# Patient Record
Sex: MALE | Race: WHITE | NOT HISPANIC OR LATINO | Employment: FULL TIME | URBAN - METROPOLITAN AREA
[De-identification: names, ages, dates, MRNs, and addresses within clinical notes are randomized per-mention and may not be internally consistent; named-entity substitution may affect disease eponyms.]

---

## 2017-01-01 ENCOUNTER — TELEPHONE (OUTPATIENT)
Dept: VASCULAR LAB | Facility: MEDICAL CENTER | Age: 57
End: 2017-01-01

## 2017-01-01 ENCOUNTER — OFFICE VISIT (OUTPATIENT)
Dept: MEDICAL GROUP | Facility: MEDICAL CENTER | Age: 57
End: 2017-01-01
Payer: COMMERCIAL

## 2017-01-01 ENCOUNTER — APPOINTMENT (OUTPATIENT)
Dept: RADIOLOGY | Facility: MEDICAL CENTER | Age: 57
DRG: 291 | End: 2017-01-01
Attending: EMERGENCY MEDICINE
Payer: COMMERCIAL

## 2017-01-01 ENCOUNTER — RESOLUTE PROFESSIONAL BILLING HOSPITAL PROF FEE (OUTPATIENT)
Dept: HOSPITALIST | Facility: MEDICAL CENTER | Age: 57
End: 2017-01-01
Payer: COMMERCIAL

## 2017-01-01 ENCOUNTER — APPOINTMENT (OUTPATIENT)
Dept: RADIOLOGY | Facility: MEDICAL CENTER | Age: 57
End: 2017-01-01
Attending: EMERGENCY MEDICINE
Payer: COMMERCIAL

## 2017-01-01 ENCOUNTER — PATIENT OUTREACH (OUTPATIENT)
Dept: HEALTH INFORMATION MANAGEMENT | Facility: OTHER | Age: 57
End: 2017-01-01

## 2017-01-01 ENCOUNTER — TELEPHONE (OUTPATIENT)
Dept: MEDICAL GROUP | Facility: MEDICAL CENTER | Age: 57
End: 2017-01-01

## 2017-01-01 ENCOUNTER — APPOINTMENT (OUTPATIENT)
Dept: RADIOLOGY | Facility: MEDICAL CENTER | Age: 57
DRG: 291 | End: 2017-01-01
Attending: HOSPITALIST
Payer: COMMERCIAL

## 2017-01-01 ENCOUNTER — HOSPITAL ENCOUNTER (EMERGENCY)
Facility: MEDICAL CENTER | Age: 57
End: 2017-06-05
Attending: EMERGENCY MEDICINE
Payer: COMMERCIAL

## 2017-01-01 ENCOUNTER — APPOINTMENT (OUTPATIENT)
Dept: RADIOLOGY | Facility: MEDICAL CENTER | Age: 57
DRG: 637 | End: 2017-01-01
Attending: EMERGENCY MEDICINE
Payer: COMMERCIAL

## 2017-01-01 ENCOUNTER — OFFICE VISIT (OUTPATIENT)
Dept: MEDICAL GROUP | Facility: CLINIC | Age: 57
End: 2017-01-01
Payer: COMMERCIAL

## 2017-01-01 ENCOUNTER — APPOINTMENT (OUTPATIENT)
Dept: RADIOLOGY | Facility: MEDICAL CENTER | Age: 57
DRG: 981 | End: 2017-01-01
Attending: HOSPITALIST
Payer: COMMERCIAL

## 2017-01-01 ENCOUNTER — APPOINTMENT (OUTPATIENT)
Dept: RADIOLOGY | Facility: MEDICAL CENTER | Age: 57
DRG: 637 | End: 2017-01-01
Attending: HOSPITALIST
Payer: COMMERCIAL

## 2017-01-01 ENCOUNTER — OFFICE VISIT (OUTPATIENT)
Dept: CARDIOLOGY | Facility: MEDICAL CENTER | Age: 57
End: 2017-01-01
Payer: COMMERCIAL

## 2017-01-01 ENCOUNTER — HOSPITAL ENCOUNTER (INPATIENT)
Facility: MEDICAL CENTER | Age: 57
LOS: 5 days | DRG: 637 | End: 2017-03-04
Attending: EMERGENCY MEDICINE | Admitting: INTERNAL MEDICINE
Payer: COMMERCIAL

## 2017-01-01 ENCOUNTER — TELEPHONE (OUTPATIENT)
Dept: CARDIOLOGY | Facility: MEDICAL CENTER | Age: 57
End: 2017-01-01

## 2017-01-01 ENCOUNTER — HOSPITAL ENCOUNTER (INPATIENT)
Facility: MEDICAL CENTER | Age: 57
LOS: 4 days | DRG: 291 | End: 2017-03-17
Attending: EMERGENCY MEDICINE | Admitting: INTERNAL MEDICINE
Payer: COMMERCIAL

## 2017-01-01 ENCOUNTER — HOME CARE VISIT (OUTPATIENT)
Dept: HOME HEALTH SERVICES | Facility: HOME HEALTHCARE | Age: 57
End: 2017-01-01

## 2017-01-01 ENCOUNTER — APPOINTMENT (OUTPATIENT)
Dept: RADIOLOGY | Facility: REHABILITATION | Age: 57
DRG: 981 | End: 2017-01-01
Attending: HOSPITALIST
Payer: COMMERCIAL

## 2017-01-01 ENCOUNTER — APPOINTMENT (OUTPATIENT)
Dept: RADIOLOGY | Facility: MEDICAL CENTER | Age: 57
DRG: 291 | End: 2017-01-01
Attending: INTERNAL MEDICINE
Payer: COMMERCIAL

## 2017-01-01 ENCOUNTER — HOME HEALTH ADMISSION (OUTPATIENT)
Dept: HOME HEALTH SERVICES | Facility: HOME HEALTHCARE | Age: 57
End: 2017-01-01
Payer: COMMERCIAL

## 2017-01-01 ENCOUNTER — RESOLUTE PROFESSIONAL BILLING HOSPITAL PROF FEE (OUTPATIENT)
Dept: CARDIOLOGY | Facility: MEDICAL CENTER | Age: 57
End: 2017-01-01
Payer: COMMERCIAL

## 2017-01-01 ENCOUNTER — APPOINTMENT (OUTPATIENT)
Dept: RADIOLOGY | Facility: MEDICAL CENTER | Age: 57
DRG: 291 | End: 2017-01-01
Attending: RADIOLOGY
Payer: COMMERCIAL

## 2017-01-01 ENCOUNTER — HOSPITAL ENCOUNTER (OUTPATIENT)
Dept: LAB | Facility: MEDICAL CENTER | Age: 57
End: 2017-04-15
Attending: NURSE PRACTITIONER
Payer: COMMERCIAL

## 2017-01-01 ENCOUNTER — HOSPITAL ENCOUNTER (INPATIENT)
Facility: MEDICAL CENTER | Age: 57
LOS: 7 days | DRG: 291 | End: 2017-01-25
Attending: EMERGENCY MEDICINE | Admitting: INTERNAL MEDICINE
Payer: COMMERCIAL

## 2017-01-01 ENCOUNTER — HOSPITAL ENCOUNTER (OUTPATIENT)
Dept: RADIOLOGY | Facility: MEDICAL CENTER | Age: 57
End: 2017-01-04
Attending: HOSPITALIST
Payer: COMMERCIAL

## 2017-01-01 VITALS
HEIGHT: 73 IN | WEIGHT: 209 LBS | TEMPERATURE: 98.1 F | OXYGEN SATURATION: 86 % | RESPIRATION RATE: 16 BRPM | BODY MASS INDEX: 27.7 KG/M2 | SYSTOLIC BLOOD PRESSURE: 136 MMHG | DIASTOLIC BLOOD PRESSURE: 90 MMHG | HEART RATE: 80 BPM

## 2017-01-01 VITALS
HEART RATE: 72 BPM | BODY MASS INDEX: 28.99 KG/M2 | SYSTOLIC BLOOD PRESSURE: 122 MMHG | DIASTOLIC BLOOD PRESSURE: 64 MMHG | WEIGHT: 214 LBS | OXYGEN SATURATION: 86 % | HEIGHT: 72 IN

## 2017-01-01 VITALS
TEMPERATURE: 97.4 F | HEIGHT: 73 IN | WEIGHT: 188 LBS | SYSTOLIC BLOOD PRESSURE: 118 MMHG | RESPIRATION RATE: 16 BRPM | HEART RATE: 67 BPM | BODY MASS INDEX: 24.92 KG/M2 | DIASTOLIC BLOOD PRESSURE: 70 MMHG | OXYGEN SATURATION: 94 %

## 2017-01-01 VITALS
HEART RATE: 69 BPM | SYSTOLIC BLOOD PRESSURE: 126 MMHG | HEIGHT: 73 IN | WEIGHT: 219 LBS | DIASTOLIC BLOOD PRESSURE: 80 MMHG | RESPIRATION RATE: 16 BRPM | OXYGEN SATURATION: 96 % | TEMPERATURE: 98.5 F | BODY MASS INDEX: 29.03 KG/M2

## 2017-01-01 VITALS
TEMPERATURE: 97.7 F | HEART RATE: 75 BPM | SYSTOLIC BLOOD PRESSURE: 128 MMHG | BODY MASS INDEX: 31.56 KG/M2 | DIASTOLIC BLOOD PRESSURE: 68 MMHG | WEIGHT: 233 LBS | HEIGHT: 72 IN | RESPIRATION RATE: 16 BRPM | OXYGEN SATURATION: 93 %

## 2017-01-01 VITALS
SYSTOLIC BLOOD PRESSURE: 126 MMHG | WEIGHT: 185.19 LBS | OXYGEN SATURATION: 92 % | HEIGHT: 72 IN | DIASTOLIC BLOOD PRESSURE: 87 MMHG | TEMPERATURE: 96.8 F | BODY MASS INDEX: 25.08 KG/M2 | HEART RATE: 65 BPM | RESPIRATION RATE: 16 BRPM

## 2017-01-01 VITALS
WEIGHT: 180 LBS | HEART RATE: 63 BPM | HEIGHT: 72 IN | TEMPERATURE: 96.6 F | DIASTOLIC BLOOD PRESSURE: 123 MMHG | BODY MASS INDEX: 24.38 KG/M2 | SYSTOLIC BLOOD PRESSURE: 181 MMHG

## 2017-01-01 VITALS
WEIGHT: 208 LBS | HEART RATE: 90 BPM | OXYGEN SATURATION: 95 % | BODY MASS INDEX: 27.57 KG/M2 | SYSTOLIC BLOOD PRESSURE: 140 MMHG | HEIGHT: 73 IN | DIASTOLIC BLOOD PRESSURE: 76 MMHG

## 2017-01-01 VITALS
DIASTOLIC BLOOD PRESSURE: 70 MMHG | HEART RATE: 79 BPM | WEIGHT: 209 LBS | RESPIRATION RATE: 16 BRPM | HEIGHT: 73 IN | TEMPERATURE: 98.5 F | OXYGEN SATURATION: 95 % | SYSTOLIC BLOOD PRESSURE: 132 MMHG | BODY MASS INDEX: 27.7 KG/M2

## 2017-01-01 VITALS
HEART RATE: 68 BPM | RESPIRATION RATE: 18 BRPM | WEIGHT: 190.04 LBS | HEIGHT: 72 IN | BODY MASS INDEX: 25.74 KG/M2 | DIASTOLIC BLOOD PRESSURE: 63 MMHG | TEMPERATURE: 98.3 F | OXYGEN SATURATION: 99 % | SYSTOLIC BLOOD PRESSURE: 106 MMHG

## 2017-01-01 VITALS
HEIGHT: 72 IN | OXYGEN SATURATION: 90 % | WEIGHT: 205 LBS | DIASTOLIC BLOOD PRESSURE: 74 MMHG | SYSTOLIC BLOOD PRESSURE: 128 MMHG | HEART RATE: 84 BPM | BODY MASS INDEX: 27.77 KG/M2

## 2017-01-01 VITALS — OXYGEN SATURATION: 97 % | SYSTOLIC BLOOD PRESSURE: 126 MMHG | DIASTOLIC BLOOD PRESSURE: 69 MMHG | HEART RATE: 85 BPM

## 2017-01-01 VITALS
HEART RATE: 64 BPM | HEIGHT: 72 IN | WEIGHT: 178.13 LBS | BODY MASS INDEX: 24.13 KG/M2 | OXYGEN SATURATION: 90 % | TEMPERATURE: 97.1 F | DIASTOLIC BLOOD PRESSURE: 61 MMHG | SYSTOLIC BLOOD PRESSURE: 114 MMHG | RESPIRATION RATE: 18 BRPM

## 2017-01-01 DIAGNOSIS — Z95.828 PRESENCE OF IVC FILTER: ICD-10-CM

## 2017-01-01 DIAGNOSIS — I50.20 ACC/AHA STAGE C SYSTOLIC HEART FAILURE (HCC): ICD-10-CM

## 2017-01-01 DIAGNOSIS — J90 PLEURAL EFFUSION: ICD-10-CM

## 2017-01-01 DIAGNOSIS — E78.2 MIXED HYPERLIPIDEMIA: ICD-10-CM

## 2017-01-01 DIAGNOSIS — I25.10 3-VESSEL CORONARY ARTERY DISEASE: ICD-10-CM

## 2017-01-01 DIAGNOSIS — J18.9 RECURRENT PNEUMONIA: ICD-10-CM

## 2017-01-01 DIAGNOSIS — K21.9 GASTROESOPHAGEAL REFLUX DISEASE WITHOUT ESOPHAGITIS: ICD-10-CM

## 2017-01-01 DIAGNOSIS — R40.4 TRANSIENT ALTERATION OF AWARENESS: ICD-10-CM

## 2017-01-01 DIAGNOSIS — R73.9 HYPERGLYCEMIA: ICD-10-CM

## 2017-01-01 DIAGNOSIS — I50.22 CHRONIC SYSTOLIC CONGESTIVE HEART FAILURE, NYHA CLASS 4 (HCC): ICD-10-CM

## 2017-01-01 DIAGNOSIS — Z95.1 S/P CABG (CORONARY ARTERY BYPASS GRAFT): ICD-10-CM

## 2017-01-01 DIAGNOSIS — I50.43 ACUTE ON CHRONIC COMBINED SYSTOLIC AND DIASTOLIC CONGESTIVE HEART FAILURE (HCC): ICD-10-CM

## 2017-01-01 DIAGNOSIS — I25.5 ISCHEMIC CARDIOMYOPATHY: ICD-10-CM

## 2017-01-01 DIAGNOSIS — Z59.9 FINANCIAL DIFFICULTIES: ICD-10-CM

## 2017-01-01 DIAGNOSIS — E11.9 DIABETES MELLITUS TYPE 2 IN NONOBESE (HCC): ICD-10-CM

## 2017-01-01 DIAGNOSIS — J96.01 ACUTE RESPIRATORY FAILURE WITH HYPOXIA (HCC): ICD-10-CM

## 2017-01-01 DIAGNOSIS — Z09 HOSPITAL DISCHARGE FOLLOW-UP: ICD-10-CM

## 2017-01-01 DIAGNOSIS — I50.20 SYSTOLIC CONGESTIVE HEART FAILURE, UNSPECIFIED CONGESTIVE HEART FAILURE CHRONICITY: ICD-10-CM

## 2017-01-01 DIAGNOSIS — I50.9 HEART FAILURE, NYHA CLASS 3 (HCC): ICD-10-CM

## 2017-01-01 DIAGNOSIS — E11.3499: ICD-10-CM

## 2017-01-01 DIAGNOSIS — R53.81 DEBILITY: ICD-10-CM

## 2017-01-01 DIAGNOSIS — I50.22 CHRONIC SYSTOLIC CONGESTIVE HEART FAILURE, NYHA CLASS 4 (HCC): Primary | ICD-10-CM

## 2017-01-01 DIAGNOSIS — I51.9 CARDIAC DISORDER: ICD-10-CM

## 2017-01-01 DIAGNOSIS — D72.829 LEUKOCYTOSIS, UNSPECIFIED TYPE: ICD-10-CM

## 2017-01-01 DIAGNOSIS — I50.9 ACUTE CONGESTIVE HEART FAILURE, UNSPECIFIED CONGESTIVE HEART FAILURE TYPE: ICD-10-CM

## 2017-01-01 LAB
ALBUMIN SERPL BCP-MCNC: 2.6 G/DL (ref 3.2–4.9)
ALBUMIN SERPL BCP-MCNC: 2.7 G/DL (ref 3.2–4.9)
ALBUMIN SERPL BCP-MCNC: 2.7 G/DL (ref 3.2–4.9)
ALBUMIN SERPL BCP-MCNC: 2.8 G/DL (ref 3.2–4.9)
ALBUMIN SERPL BCP-MCNC: 2.9 G/DL (ref 3.2–4.9)
ALBUMIN SERPL BCP-MCNC: 3 G/DL (ref 3.2–4.9)
ALBUMIN SERPL BCP-MCNC: 3.1 G/DL (ref 3.2–4.9)
ALBUMIN SERPL BCP-MCNC: 3.2 G/DL (ref 3.2–4.9)
ALBUMIN/GLOB SERPL: 0.6 G/DL
ALBUMIN/GLOB SERPL: 0.8 G/DL
ALBUMIN/GLOB SERPL: 0.9 G/DL
ALBUMIN/GLOB SERPL: 1.1 G/DL
ALP SERPL-CCNC: 115 U/L (ref 30–99)
ALP SERPL-CCNC: 131 U/L (ref 30–99)
ALP SERPL-CCNC: 139 U/L (ref 30–99)
ALP SERPL-CCNC: 139 U/L (ref 30–99)
ALP SERPL-CCNC: 172 U/L (ref 30–99)
ALP SERPL-CCNC: 197 U/L (ref 30–99)
ALP SERPL-CCNC: 269 U/L (ref 30–99)
ALP SERPL-CCNC: 79 U/L (ref 30–99)
ALT SERPL-CCNC: 108 U/L (ref 2–50)
ALT SERPL-CCNC: 15 U/L (ref 2–50)
ALT SERPL-CCNC: 27 U/L (ref 2–50)
ALT SERPL-CCNC: 38 U/L (ref 2–50)
ALT SERPL-CCNC: 41 U/L (ref 2–50)
ALT SERPL-CCNC: 44 U/L (ref 2–50)
ALT SERPL-CCNC: 68 U/L (ref 2–50)
ALT SERPL-CCNC: 77 U/L (ref 2–50)
AMPHET UR QL SCN: NEGATIVE
ANION GAP SERPL CALC-SCNC: 10 MMOL/L (ref 0–11.9)
ANION GAP SERPL CALC-SCNC: 23 MMOL/L (ref 0–11.9)
ANION GAP SERPL CALC-SCNC: 4 MMOL/L (ref 0–11.9)
ANION GAP SERPL CALC-SCNC: 4 MMOL/L (ref 0–11.9)
ANION GAP SERPL CALC-SCNC: 5 MMOL/L (ref 0–11.9)
ANION GAP SERPL CALC-SCNC: 6 MMOL/L (ref 0–11.9)
ANION GAP SERPL CALC-SCNC: 6 MMOL/L (ref 0–11.9)
ANION GAP SERPL CALC-SCNC: 7 MMOL/L (ref 0–11.9)
ANION GAP SERPL CALC-SCNC: 7 MMOL/L (ref 0–11.9)
ANION GAP SERPL CALC-SCNC: 8 MMOL/L (ref 0–11.9)
ANION GAP SERPL CALC-SCNC: 8 MMOL/L (ref 0–11.9)
ANION GAP SERPL CALC-SCNC: 9 MMOL/L (ref 0–11.9)
ANISOCYTOSIS BLD QL SMEAR: ABNORMAL
ANISOCYTOSIS BLD QL SMEAR: ABNORMAL
APPEARANCE UR: ABNORMAL
APPEARANCE UR: CLEAR
APTT PPP: 29.3 SEC (ref 24.7–36)
APTT PPP: 33.3 SEC (ref 24.7–36)
APTT PPP: 35.4 SEC (ref 24.7–36)
AST SERPL-CCNC: 114 U/L (ref 12–45)
AST SERPL-CCNC: 140 U/L (ref 12–45)
AST SERPL-CCNC: 19 U/L (ref 12–45)
AST SERPL-CCNC: 20 U/L (ref 12–45)
AST SERPL-CCNC: 21 U/L (ref 12–45)
AST SERPL-CCNC: 22 U/L (ref 12–45)
AST SERPL-CCNC: 42 U/L (ref 12–45)
AST SERPL-CCNC: 8 U/L (ref 12–45)
BACTERIA #/AREA URNS HPF: ABNORMAL /HPF
BACTERIA BLD CULT: NORMAL
BACTERIA FLD AEROBE CULT: NORMAL
BACTERIA UR CULT: NORMAL
BARBITURATES UR QL SCN: NEGATIVE
BASOPHILS # BLD AUTO: 0 % (ref 0–1.8)
BASOPHILS # BLD AUTO: 0.2 % (ref 0–1.8)
BASOPHILS # BLD AUTO: 0.2 % (ref 0–1.8)
BASOPHILS # BLD AUTO: 0.3 % (ref 0–1.8)
BASOPHILS # BLD AUTO: 0.9 % (ref 0–1.8)
BASOPHILS # BLD: 0 K/UL (ref 0–0.12)
BASOPHILS # BLD: 0.02 K/UL (ref 0–0.12)
BASOPHILS # BLD: 0.03 K/UL (ref 0–0.12)
BASOPHILS # BLD: 0.04 K/UL (ref 0–0.12)
BASOPHILS # BLD: 0.24 K/UL (ref 0–0.12)
BENZODIAZ UR QL SCN: NEGATIVE
BILIRUB SERPL-MCNC: 0.6 MG/DL (ref 0.1–1.5)
BILIRUB SERPL-MCNC: 0.6 MG/DL (ref 0.1–1.5)
BILIRUB SERPL-MCNC: 0.7 MG/DL (ref 0.1–1.5)
BILIRUB SERPL-MCNC: 0.8 MG/DL (ref 0.1–1.5)
BILIRUB SERPL-MCNC: 0.8 MG/DL (ref 0.1–1.5)
BILIRUB SERPL-MCNC: 0.9 MG/DL (ref 0.1–1.5)
BILIRUB SERPL-MCNC: 1.1 MG/DL (ref 0.1–1.5)
BILIRUB SERPL-MCNC: 1.2 MG/DL (ref 0.1–1.5)
BILIRUB UR QL STRIP.AUTO: NEGATIVE
BNP SERPL-MCNC: 2027 PG/ML (ref 0–100)
BNP SERPL-MCNC: 2349 PG/ML (ref 0–100)
BNP SERPL-MCNC: 3035 PG/ML (ref 0–100)
BNP SERPL-MCNC: 561 PG/ML (ref 0–100)
BUN SERPL-MCNC: 22 MG/DL (ref 8–22)
BUN SERPL-MCNC: 22 MG/DL (ref 8–22)
BUN SERPL-MCNC: 23 MG/DL (ref 8–22)
BUN SERPL-MCNC: 23 MG/DL (ref 8–22)
BUN SERPL-MCNC: 25 MG/DL (ref 8–22)
BUN SERPL-MCNC: 25 MG/DL (ref 8–22)
BUN SERPL-MCNC: 26 MG/DL (ref 8–22)
BUN SERPL-MCNC: 32 MG/DL (ref 8–22)
BUN SERPL-MCNC: 34 MG/DL (ref 8–22)
BUN SERPL-MCNC: 38 MG/DL (ref 8–22)
BUN SERPL-MCNC: 39 MG/DL (ref 8–22)
BUN SERPL-MCNC: 45 MG/DL (ref 8–22)
BUN SERPL-MCNC: 49 MG/DL (ref 8–22)
BUN SERPL-MCNC: 69 MG/DL (ref 8–22)
BURR CELLS BLD QL SMEAR: NORMAL
BZE UR QL SCN: NEGATIVE
CALCIUM SERPL-MCNC: 7.8 MG/DL (ref 8.5–10.5)
CALCIUM SERPL-MCNC: 7.8 MG/DL (ref 8.5–10.5)
CALCIUM SERPL-MCNC: 8.2 MG/DL (ref 8.5–10.5)
CALCIUM SERPL-MCNC: 8.3 MG/DL (ref 8.5–10.5)
CALCIUM SERPL-MCNC: 8.4 MG/DL (ref 8.5–10.5)
CALCIUM SERPL-MCNC: 8.5 MG/DL (ref 8.5–10.5)
CALCIUM SERPL-MCNC: 8.5 MG/DL (ref 8.5–10.5)
CALCIUM SERPL-MCNC: 8.6 MG/DL (ref 8.5–10.5)
CALCIUM SERPL-MCNC: 8.7 MG/DL (ref 8.5–10.5)
CALCIUM SERPL-MCNC: 9.3 MG/DL (ref 8.5–10.5)
CANNABINOIDS UR QL SCN: NEGATIVE
CHLORIDE SERPL-SCNC: 100 MMOL/L (ref 96–112)
CHLORIDE SERPL-SCNC: 101 MMOL/L (ref 96–112)
CHLORIDE SERPL-SCNC: 101 MMOL/L (ref 96–112)
CHLORIDE SERPL-SCNC: 103 MMOL/L (ref 96–112)
CHLORIDE SERPL-SCNC: 103 MMOL/L (ref 96–112)
CHLORIDE SERPL-SCNC: 104 MMOL/L (ref 96–112)
CHLORIDE SERPL-SCNC: 104 MMOL/L (ref 96–112)
CHLORIDE SERPL-SCNC: 106 MMOL/L (ref 96–112)
CHLORIDE SERPL-SCNC: 107 MMOL/L (ref 96–112)
CHLORIDE SERPL-SCNC: 107 MMOL/L (ref 96–112)
CHLORIDE SERPL-SCNC: 108 MMOL/L (ref 96–112)
CHLORIDE SERPL-SCNC: 111 MMOL/L (ref 96–112)
CHLORIDE SERPL-SCNC: 115 MMOL/L (ref 96–112)
CHLORIDE SERPL-SCNC: 88 MMOL/L (ref 96–112)
CHLORIDE SERPL-SCNC: 89 MMOL/L (ref 96–112)
CHLORIDE SERPL-SCNC: 99 MMOL/L (ref 96–112)
CHOLEST SERPL-MCNC: 106 MG/DL (ref 100–199)
CO2 SERPL-SCNC: 15 MMOL/L (ref 20–33)
CO2 SERPL-SCNC: 19 MMOL/L (ref 20–33)
CO2 SERPL-SCNC: 20 MMOL/L (ref 20–33)
CO2 SERPL-SCNC: 21 MMOL/L (ref 20–33)
CO2 SERPL-SCNC: 22 MMOL/L (ref 20–33)
CO2 SERPL-SCNC: 23 MMOL/L (ref 20–33)
CO2 SERPL-SCNC: 23 MMOL/L (ref 20–33)
CO2 SERPL-SCNC: 25 MMOL/L (ref 20–33)
CO2 SERPL-SCNC: 26 MMOL/L (ref 20–33)
CO2 SERPL-SCNC: 27 MMOL/L (ref 20–33)
CO2 SERPL-SCNC: 30 MMOL/L (ref 20–33)
CO2 SERPL-SCNC: 31 MMOL/L (ref 20–33)
CO2 SERPL-SCNC: 31 MMOL/L (ref 20–33)
COLOR UR: ABNORMAL
COLOR UR: ABNORMAL
COLOR UR: YELLOW
COMMENT 1642: NORMAL
CREAT SERPL-MCNC: 0.68 MG/DL (ref 0.5–1.4)
CREAT SERPL-MCNC: 0.72 MG/DL (ref 0.5–1.4)
CREAT SERPL-MCNC: 0.75 MG/DL (ref 0.5–1.4)
CREAT SERPL-MCNC: 0.82 MG/DL (ref 0.5–1.4)
CREAT SERPL-MCNC: 0.82 MG/DL (ref 0.5–1.4)
CREAT SERPL-MCNC: 0.85 MG/DL (ref 0.5–1.4)
CREAT SERPL-MCNC: 0.86 MG/DL (ref 0.5–1.4)
CREAT SERPL-MCNC: 0.86 MG/DL (ref 0.5–1.4)
CREAT SERPL-MCNC: 0.88 MG/DL (ref 0.5–1.4)
CREAT SERPL-MCNC: 0.98 MG/DL (ref 0.5–1.4)
CREAT SERPL-MCNC: 1.06 MG/DL (ref 0.5–1.4)
CREAT SERPL-MCNC: 1.13 MG/DL (ref 0.5–1.4)
CREAT SERPL-MCNC: 1.23 MG/DL (ref 0.5–1.4)
CREAT SERPL-MCNC: 2.11 MG/DL (ref 0.5–1.4)
CULTURE IF INDICATED INDCX: NO UA CULTURE
DACRYOCYTES BLD QL SMEAR: NORMAL
DACRYOCYTES BLD QL SMEAR: NORMAL
EKG IMPRESSION: NORMAL
EOSINOPHIL # BLD AUTO: 0 K/UL (ref 0–0.51)
EOSINOPHIL # BLD AUTO: 0 K/UL (ref 0–0.51)
EOSINOPHIL # BLD AUTO: 0.02 K/UL (ref 0–0.51)
EOSINOPHIL # BLD AUTO: 0.09 K/UL (ref 0–0.51)
EOSINOPHIL # BLD AUTO: 0.26 K/UL (ref 0–0.51)
EOSINOPHIL # BLD AUTO: 0.29 K/UL (ref 0–0.51)
EOSINOPHIL # BLD AUTO: 0.38 K/UL (ref 0–0.51)
EOSINOPHIL # BLD AUTO: 1.13 K/UL (ref 0–0.51)
EOSINOPHIL NFR BLD: 0 % (ref 0–6.9)
EOSINOPHIL NFR BLD: 0 % (ref 0–6.9)
EOSINOPHIL NFR BLD: 0.1 % (ref 0–6.9)
EOSINOPHIL NFR BLD: 0.7 % (ref 0–6.9)
EOSINOPHIL NFR BLD: 2.9 % (ref 0–6.9)
EOSINOPHIL NFR BLD: 4 % (ref 0–6.9)
EOSINOPHIL NFR BLD: 4.3 % (ref 0–6.9)
EOSINOPHIL NFR BLD: 4.4 % (ref 0–6.9)
EPI CELLS #/AREA URNS HPF: ABNORMAL /HPF
ERYTHROCYTE [DISTWIDTH] IN BLOOD BY AUTOMATED COUNT: 47.5 FL (ref 35.9–50)
ERYTHROCYTE [DISTWIDTH] IN BLOOD BY AUTOMATED COUNT: 51.8 FL (ref 35.9–50)
ERYTHROCYTE [DISTWIDTH] IN BLOOD BY AUTOMATED COUNT: 52 FL (ref 35.9–50)
ERYTHROCYTE [DISTWIDTH] IN BLOOD BY AUTOMATED COUNT: 52.2 FL (ref 35.9–50)
ERYTHROCYTE [DISTWIDTH] IN BLOOD BY AUTOMATED COUNT: 52.4 FL (ref 35.9–50)
ERYTHROCYTE [DISTWIDTH] IN BLOOD BY AUTOMATED COUNT: 52.5 FL (ref 35.9–50)
ERYTHROCYTE [DISTWIDTH] IN BLOOD BY AUTOMATED COUNT: 52.6 FL (ref 35.9–50)
ERYTHROCYTE [DISTWIDTH] IN BLOOD BY AUTOMATED COUNT: 53 FL (ref 35.9–50)
ERYTHROCYTE [DISTWIDTH] IN BLOOD BY AUTOMATED COUNT: 53.1 FL (ref 35.9–50)
ERYTHROCYTE [DISTWIDTH] IN BLOOD BY AUTOMATED COUNT: 53.7 FL (ref 35.9–50)
ERYTHROCYTE [DISTWIDTH] IN BLOOD BY AUTOMATED COUNT: 54.8 FL (ref 35.9–50)
ERYTHROCYTE [DISTWIDTH] IN BLOOD BY AUTOMATED COUNT: 54.8 FL (ref 35.9–50)
ERYTHROCYTE [DISTWIDTH] IN BLOOD BY AUTOMATED COUNT: 55.8 FL (ref 35.9–50)
EST. AVERAGE GLUCOSE BLD GHB EST-MCNC: 427 MG/DL
FERRITIN SERPL-MCNC: 767.5 NG/ML (ref 22–322)
FOLATE SERPL-MCNC: 7.2 NG/ML
GFR SERPL CREATININE-BSD FRML MDRD: 33 ML/MIN/1.73 M 2
GFR SERPL CREATININE-BSD FRML MDRD: >60 ML/MIN/1.73 M 2
GIANT PLATELETS BLD QL SMEAR: NORMAL
GLOBULIN SER CALC-MCNC: 2.9 G/DL (ref 1.9–3.5)
GLOBULIN SER CALC-MCNC: 3.3 G/DL (ref 1.9–3.5)
GLOBULIN SER CALC-MCNC: 3.3 G/DL (ref 1.9–3.5)
GLOBULIN SER CALC-MCNC: 3.6 G/DL (ref 1.9–3.5)
GLOBULIN SER CALC-MCNC: 3.7 G/DL (ref 1.9–3.5)
GLOBULIN SER CALC-MCNC: 4.2 G/DL (ref 1.9–3.5)
GLUCOSE BLD-MCNC: 100 MG/DL (ref 65–99)
GLUCOSE BLD-MCNC: 105 MG/DL (ref 65–99)
GLUCOSE BLD-MCNC: 111 MG/DL (ref 65–99)
GLUCOSE BLD-MCNC: 115 MG/DL (ref 65–99)
GLUCOSE BLD-MCNC: 122 MG/DL (ref 65–99)
GLUCOSE BLD-MCNC: 125 MG/DL (ref 65–99)
GLUCOSE BLD-MCNC: 128 MG/DL (ref 65–99)
GLUCOSE BLD-MCNC: 131 MG/DL (ref 65–99)
GLUCOSE BLD-MCNC: 134 MG/DL (ref 65–99)
GLUCOSE BLD-MCNC: 141 MG/DL (ref 65–99)
GLUCOSE BLD-MCNC: 145 MG/DL (ref 65–99)
GLUCOSE BLD-MCNC: 145 MG/DL (ref 65–99)
GLUCOSE BLD-MCNC: 147 MG/DL (ref 65–99)
GLUCOSE BLD-MCNC: 151 MG/DL (ref 65–99)
GLUCOSE BLD-MCNC: 153 MG/DL (ref 65–99)
GLUCOSE BLD-MCNC: 155 MG/DL (ref 65–99)
GLUCOSE BLD-MCNC: 155 MG/DL (ref 65–99)
GLUCOSE BLD-MCNC: 156 MG/DL (ref 65–99)
GLUCOSE BLD-MCNC: 158 MG/DL (ref 65–99)
GLUCOSE BLD-MCNC: 159 MG/DL (ref 65–99)
GLUCOSE BLD-MCNC: 159 MG/DL (ref 65–99)
GLUCOSE BLD-MCNC: 166 MG/DL (ref 65–99)
GLUCOSE BLD-MCNC: 170 MG/DL (ref 65–99)
GLUCOSE BLD-MCNC: 171 MG/DL (ref 65–99)
GLUCOSE BLD-MCNC: 176 MG/DL (ref 65–99)
GLUCOSE BLD-MCNC: 179 MG/DL (ref 65–99)
GLUCOSE BLD-MCNC: 180 MG/DL (ref 65–99)
GLUCOSE BLD-MCNC: 182 MG/DL (ref 65–99)
GLUCOSE BLD-MCNC: 185 MG/DL (ref 65–99)
GLUCOSE BLD-MCNC: 189 MG/DL (ref 65–99)
GLUCOSE BLD-MCNC: 193 MG/DL (ref 65–99)
GLUCOSE BLD-MCNC: 195 MG/DL (ref 65–99)
GLUCOSE BLD-MCNC: 199 MG/DL (ref 65–99)
GLUCOSE BLD-MCNC: 203 MG/DL (ref 65–99)
GLUCOSE BLD-MCNC: 203 MG/DL (ref 65–99)
GLUCOSE BLD-MCNC: 208 MG/DL (ref 65–99)
GLUCOSE BLD-MCNC: 209 MG/DL (ref 65–99)
GLUCOSE BLD-MCNC: 211 MG/DL (ref 65–99)
GLUCOSE BLD-MCNC: 214 MG/DL (ref 65–99)
GLUCOSE BLD-MCNC: 215 MG/DL (ref 65–99)
GLUCOSE BLD-MCNC: 216 MG/DL (ref 65–99)
GLUCOSE BLD-MCNC: 218 MG/DL (ref 65–99)
GLUCOSE BLD-MCNC: 221 MG/DL (ref 65–99)
GLUCOSE BLD-MCNC: 227 MG/DL (ref 65–99)
GLUCOSE BLD-MCNC: 229 MG/DL (ref 65–99)
GLUCOSE BLD-MCNC: 234 MG/DL (ref 65–99)
GLUCOSE BLD-MCNC: 255 MG/DL (ref 65–99)
GLUCOSE BLD-MCNC: 255 MG/DL (ref 65–99)
GLUCOSE BLD-MCNC: 259 MG/DL (ref 65–99)
GLUCOSE BLD-MCNC: 267 MG/DL (ref 65–99)
GLUCOSE BLD-MCNC: 275 MG/DL (ref 65–99)
GLUCOSE BLD-MCNC: 279 MG/DL (ref 65–99)
GLUCOSE BLD-MCNC: 283 MG/DL (ref 65–99)
GLUCOSE BLD-MCNC: 285 MG/DL (ref 65–99)
GLUCOSE BLD-MCNC: 317 MG/DL (ref 65–99)
GLUCOSE BLD-MCNC: 320 MG/DL (ref 65–99)
GLUCOSE BLD-MCNC: 326 MG/DL (ref 65–99)
GLUCOSE BLD-MCNC: 330 MG/DL (ref 65–99)
GLUCOSE BLD-MCNC: 335 MG/DL (ref 65–99)
GLUCOSE BLD-MCNC: 350 MG/DL (ref 65–99)
GLUCOSE BLD-MCNC: 370 MG/DL (ref 65–99)
GLUCOSE BLD-MCNC: 431 MG/DL (ref 65–99)
GLUCOSE BLD-MCNC: 444 MG/DL (ref 65–99)
GLUCOSE BLD-MCNC: 469 MG/DL (ref 65–99)
GLUCOSE BLD-MCNC: 71 MG/DL (ref 65–99)
GLUCOSE BLD-MCNC: 94 MG/DL (ref 65–99)
GLUCOSE BLD-MCNC: >600 MG/DL (ref 65–99)
GLUCOSE BLD-MCNC: >600 MG/DL (ref 65–99)
GLUCOSE SERPL-MCNC: 102 MG/DL (ref 65–99)
GLUCOSE SERPL-MCNC: 1296 MG/DL (ref 65–99)
GLUCOSE SERPL-MCNC: 141 MG/DL (ref 65–99)
GLUCOSE SERPL-MCNC: 160 MG/DL (ref 65–99)
GLUCOSE SERPL-MCNC: 220 MG/DL (ref 65–99)
GLUCOSE SERPL-MCNC: 251 MG/DL (ref 65–99)
GLUCOSE SERPL-MCNC: 261 MG/DL (ref 65–99)
GLUCOSE SERPL-MCNC: 271 MG/DL (ref 65–99)
GLUCOSE SERPL-MCNC: 291 MG/DL (ref 65–99)
GLUCOSE SERPL-MCNC: 325 MG/DL (ref 65–99)
GLUCOSE SERPL-MCNC: 382 MG/DL (ref 65–99)
GLUCOSE SERPL-MCNC: 410 MG/DL (ref 65–99)
GLUCOSE SERPL-MCNC: 453 MG/DL (ref 65–99)
GLUCOSE SERPL-MCNC: 556 MG/DL (ref 65–99)
GLUCOSE SERPL-MCNC: 600 MG/DL (ref 65–99)
GLUCOSE SERPL-MCNC: 635 MG/DL (ref 65–99)
GLUCOSE SERPL-MCNC: 816 MG/DL (ref 65–99)
GLUCOSE SERPL-MCNC: 892 MG/DL (ref 65–99)
GLUCOSE UR STRIP.AUTO-MCNC: 500 MG/DL
GLUCOSE UR STRIP.AUTO-MCNC: >1000 MG/DL
GLUCOSE UR STRIP.AUTO-MCNC: >1000 MG/DL
GLUCOSE UR STRIP.AUTO-MCNC: ABNORMAL MG/DL
GLUCOSE UR STRIP.AUTO-MCNC: NEGATIVE MG/DL
GRAM STN SPEC: NORMAL
GRAM STN SPEC: NORMAL
HBA1C MFR BLD: 16.5 % (ref 0–5.6)
HCT VFR BLD AUTO: 35.3 % (ref 42–52)
HCT VFR BLD AUTO: 35.5 % (ref 42–52)
HCT VFR BLD AUTO: 36.3 % (ref 42–52)
HCT VFR BLD AUTO: 37.2 % (ref 42–52)
HCT VFR BLD AUTO: 38.2 % (ref 42–52)
HCT VFR BLD AUTO: 38.6 % (ref 42–52)
HCT VFR BLD AUTO: 39.7 % (ref 42–52)
HCT VFR BLD AUTO: 39.7 % (ref 42–52)
HCT VFR BLD AUTO: 41.6 % (ref 42–52)
HCT VFR BLD AUTO: 41.8 % (ref 42–52)
HCT VFR BLD AUTO: 42.6 % (ref 42–52)
HCT VFR BLD AUTO: 43.4 % (ref 42–52)
HCT VFR BLD AUTO: 48 % (ref 42–52)
HDLC SERPL-MCNC: 33 MG/DL
HGB BLD-MCNC: 10.8 G/DL (ref 14–18)
HGB BLD-MCNC: 11.2 G/DL (ref 14–18)
HGB BLD-MCNC: 11.6 G/DL (ref 14–18)
HGB BLD-MCNC: 11.6 G/DL (ref 14–18)
HGB BLD-MCNC: 11.8 G/DL (ref 14–18)
HGB BLD-MCNC: 12.3 G/DL (ref 14–18)
HGB BLD-MCNC: 12.4 G/DL (ref 14–18)
HGB BLD-MCNC: 12.6 G/DL (ref 14–18)
HGB BLD-MCNC: 12.6 G/DL (ref 14–18)
HGB BLD-MCNC: 13.3 G/DL (ref 14–18)
HGB BLD-MCNC: 13.4 G/DL (ref 14–18)
HGB BLD-MCNC: 13.5 G/DL (ref 14–18)
HGB BLD-MCNC: 14 G/DL (ref 14–18)
HYALINE CASTS #/AREA URNS LPF: >10 /LPF
HYALINE CASTS #/AREA URNS LPF: >10 /LPF
HYALINE CASTS #/AREA URNS LPF: ABNORMAL /LPF
HYALINE CASTS #/AREA URNS LPF: ABNORMAL /LPF
IMM GRANULOCYTES # BLD AUTO: 0.03 K/UL (ref 0–0.11)
IMM GRANULOCYTES # BLD AUTO: 0.08 K/UL (ref 0–0.11)
IMM GRANULOCYTES # BLD AUTO: 0.08 K/UL (ref 0–0.11)
IMM GRANULOCYTES NFR BLD AUTO: 0.3 % (ref 0–0.9)
IMM GRANULOCYTES NFR BLD AUTO: 0.6 % (ref 0–0.9)
IMM GRANULOCYTES NFR BLD AUTO: 0.6 % (ref 0–0.9)
INR PPP: 0.99 (ref 0.87–1.13)
INR PPP: 1.01 (ref 0.87–1.13)
INR PPP: 1.31 (ref 0.87–1.13)
IRON SATN MFR SERPL: 18 % (ref 15–55)
IRON SERPL-MCNC: 38 UG/DL (ref 50–180)
KETONES UR STRIP.AUTO-MCNC: ABNORMAL MG/DL
KETONES UR STRIP.AUTO-MCNC: NEGATIVE MG/DL
LACTATE BLD-SCNC: 1.4 MMOL/L (ref 0.5–2)
LACTATE BLD-SCNC: 1.7 MMOL/L (ref 0.5–2)
LACTATE BLD-SCNC: 2.1 MMOL/L (ref 0.5–2)
LACTATE BLD-SCNC: 2.9 MMOL/L (ref 0.5–2)
LACTATE BLD-SCNC: 8.4 MMOL/L (ref 0.5–2)
LDLC SERPL CALC-MCNC: 50 MG/DL
LEUKOCYTE ESTERASE UR QL STRIP.AUTO: NEGATIVE
LG PLATELETS BLD QL SMEAR: NORMAL
LIPASE SERPL-CCNC: 33 U/L (ref 11–82)
LIPASE SERPL-CCNC: 6 U/L (ref 11–82)
LYMPHOCYTES # BLD AUTO: 0.69 K/UL (ref 1–4.8)
LYMPHOCYTES # BLD AUTO: 1.01 K/UL (ref 1–4.8)
LYMPHOCYTES # BLD AUTO: 1.36 K/UL (ref 1–4.8)
LYMPHOCYTES # BLD AUTO: 1.6 K/UL (ref 1–4.8)
LYMPHOCYTES # BLD AUTO: 1.87 K/UL (ref 1–4.8)
LYMPHOCYTES # BLD AUTO: 1.87 K/UL (ref 1–4.8)
LYMPHOCYTES # BLD AUTO: 2.72 K/UL (ref 1–4.8)
LYMPHOCYTES # BLD AUTO: 2.74 K/UL (ref 1–4.8)
LYMPHOCYTES NFR BLD: 10.4 % (ref 22–41)
LYMPHOCYTES NFR BLD: 12.3 % (ref 22–41)
LYMPHOCYTES NFR BLD: 20 % (ref 22–41)
LYMPHOCYTES NFR BLD: 21.1 % (ref 22–41)
LYMPHOCYTES NFR BLD: 21.7 % (ref 22–41)
LYMPHOCYTES NFR BLD: 38 % (ref 22–41)
LYMPHOCYTES NFR BLD: 7 % (ref 22–41)
LYMPHOCYTES NFR BLD: 7.2 % (ref 22–41)
MACROCYTES BLD QL SMEAR: ABNORMAL
MAGNESIUM SERPL-MCNC: 1.4 MG/DL (ref 1.5–2.5)
MAGNESIUM SERPL-MCNC: 1.9 MG/DL (ref 1.5–2.5)
MAGNESIUM SERPL-MCNC: 1.9 MG/DL (ref 1.5–2.5)
MAGNESIUM SERPL-MCNC: 2.6 MG/DL (ref 1.5–2.5)
MANUAL DIFF BLD: NORMAL
MCH RBC QN AUTO: 26.1 PG (ref 27–33)
MCH RBC QN AUTO: 26.3 PG (ref 27–33)
MCH RBC QN AUTO: 26.3 PG (ref 27–33)
MCH RBC QN AUTO: 26.4 PG (ref 27–33)
MCH RBC QN AUTO: 26.5 PG (ref 27–33)
MCH RBC QN AUTO: 26.7 PG (ref 27–33)
MCH RBC QN AUTO: 27 PG (ref 27–33)
MCH RBC QN AUTO: 27.3 PG (ref 27–33)
MCH RBC QN AUTO: 27.5 PG (ref 27–33)
MCHC RBC AUTO-ENTMCNC: 29.2 G/DL (ref 33.7–35.3)
MCHC RBC AUTO-ENTMCNC: 29.8 G/DL (ref 33.7–35.3)
MCHC RBC AUTO-ENTMCNC: 30.1 G/DL (ref 33.7–35.3)
MCHC RBC AUTO-ENTMCNC: 30.6 G/DL (ref 33.7–35.3)
MCHC RBC AUTO-ENTMCNC: 31.1 G/DL (ref 33.7–35.3)
MCHC RBC AUTO-ENTMCNC: 31.2 G/DL (ref 33.7–35.3)
MCHC RBC AUTO-ENTMCNC: 31.5 G/DL (ref 33.7–35.3)
MCHC RBC AUTO-ENTMCNC: 31.7 G/DL (ref 33.7–35.3)
MCHC RBC AUTO-ENTMCNC: 32 G/DL (ref 33.7–35.3)
MCHC RBC AUTO-ENTMCNC: 32.1 G/DL (ref 33.7–35.3)
MCHC RBC AUTO-ENTMCNC: 32.2 G/DL (ref 33.7–35.3)
MCV RBC AUTO: 81.8 FL (ref 81.4–97.8)
MCV RBC AUTO: 82.3 FL (ref 81.4–97.8)
MCV RBC AUTO: 83.1 FL (ref 81.4–97.8)
MCV RBC AUTO: 83.4 FL (ref 81.4–97.8)
MCV RBC AUTO: 83.4 FL (ref 81.4–97.8)
MCV RBC AUTO: 83.8 FL (ref 81.4–97.8)
MCV RBC AUTO: 85 FL (ref 81.4–97.8)
MCV RBC AUTO: 85.9 FL (ref 81.4–97.8)
MCV RBC AUTO: 85.9 FL (ref 81.4–97.8)
MCV RBC AUTO: 87 FL (ref 81.4–97.8)
MCV RBC AUTO: 87.5 FL (ref 81.4–97.8)
MCV RBC AUTO: 88.5 FL (ref 81.4–97.8)
MCV RBC AUTO: 93.6 FL (ref 81.4–97.8)
MDMA UR QL SCN: NEGATIVE
METAMYELOCYTES NFR BLD MANUAL: 5.2 %
METHADONE UR QL SCN: NEGATIVE
MICRO URNS: ABNORMAL
MICROCYTES BLD QL SMEAR: ABNORMAL
MICROCYTES BLD QL SMEAR: ABNORMAL
MONOCYTES # BLD AUTO: 0 K/UL (ref 0–0.85)
MONOCYTES # BLD AUTO: 0 K/UL (ref 0–0.85)
MONOCYTES # BLD AUTO: 0.34 K/UL (ref 0–0.85)
MONOCYTES # BLD AUTO: 0.37 K/UL (ref 0–0.85)
MONOCYTES # BLD AUTO: 0.79 K/UL (ref 0–0.85)
MONOCYTES # BLD AUTO: 0.99 K/UL (ref 0–0.85)
MONOCYTES # BLD AUTO: 1.12 K/UL (ref 0–0.85)
MONOCYTES # BLD AUTO: 1.29 K/UL (ref 0–0.85)
MONOCYTES NFR BLD AUTO: 0 % (ref 0–13.4)
MONOCYTES NFR BLD AUTO: 0 % (ref 0–13.4)
MONOCYTES NFR BLD AUTO: 11 % (ref 0–13.4)
MONOCYTES NFR BLD AUTO: 11.2 % (ref 0–13.4)
MONOCYTES NFR BLD AUTO: 4.3 % (ref 0–13.4)
MONOCYTES NFR BLD AUTO: 5 % (ref 0–13.4)
MONOCYTES NFR BLD AUTO: 8 % (ref 0–13.4)
MONOCYTES NFR BLD AUTO: 9.9 % (ref 0–13.4)
MORPHOLOGY BLD-IMP: NORMAL
MUCOUS THREADS #/AREA URNS HPF: ABNORMAL /HPF
MYELOCYTES NFR BLD MANUAL: 2.6 %
NEUTROPHILS # BLD AUTO: 11.68 K/UL (ref 1.82–7.42)
NEUTROPHILS # BLD AUTO: 18.71 K/UL (ref 1.82–7.42)
NEUTROPHILS # BLD AUTO: 3.38 K/UL (ref 1.82–7.42)
NEUTROPHILS # BLD AUTO: 5.1 K/UL (ref 1.82–7.42)
NEUTROPHILS # BLD AUTO: 5.68 K/UL (ref 1.82–7.42)
NEUTROPHILS # BLD AUTO: 5.99 K/UL (ref 1.82–7.42)
NEUTROPHILS # BLD AUTO: 9.21 K/UL (ref 1.82–7.42)
NEUTROPHILS # BLD AUTO: 9.97 K/UL (ref 1.82–7.42)
NEUTROPHILS NFR BLD: 47 % (ref 44–72)
NEUTROPHILS NFR BLD: 64.3 % (ref 44–72)
NEUTROPHILS NFR BLD: 64.4 % (ref 44–72)
NEUTROPHILS NFR BLD: 65 % (ref 44–72)
NEUTROPHILS NFR BLD: 67 % (ref 44–72)
NEUTROPHILS NFR BLD: 76.3 % (ref 44–72)
NEUTROPHILS NFR BLD: 83.8 % (ref 44–72)
NEUTROPHILS NFR BLD: 92.1 % (ref 44–72)
NEUTS BAND NFR BLD MANUAL: 0.9 % (ref 0–10)
NEUTS BAND NFR BLD MANUAL: 10 % (ref 0–10)
NEUTS BAND NFR BLD MANUAL: 2.6 % (ref 0–10)
NEUTS BAND NFR BLD MANUAL: 7 % (ref 0–10)
NITRITE UR QL STRIP.AUTO: NEGATIVE
NRBC # BLD AUTO: 0 K/UL
NRBC # BLD AUTO: 0.2 K/UL
NRBC BLD AUTO-RTO: 0 /100 WBC
NRBC BLD AUTO-RTO: 0.8 /100 WBC
OPIATES UR QL SCN: NEGATIVE
OVALOCYTES BLD QL SMEAR: NORMAL
OXYCODONE UR QL SCN: NEGATIVE
PCP UR QL SCN: NEGATIVE
PH UR STRIP.AUTO: 6 [PH]
PH UR STRIP.AUTO: 6.5 [PH]
PHOSPHATE SERPL-MCNC: 9.4 MG/DL (ref 2.5–4.5)
PLATELET # BLD AUTO: 150 K/UL (ref 164–446)
PLATELET # BLD AUTO: 156 K/UL (ref 164–446)
PLATELET # BLD AUTO: 162 K/UL (ref 164–446)
PLATELET # BLD AUTO: 164 K/UL (ref 164–446)
PLATELET # BLD AUTO: 184 K/UL (ref 164–446)
PLATELET # BLD AUTO: 186 K/UL (ref 164–446)
PLATELET # BLD AUTO: 191 K/UL (ref 164–446)
PLATELET # BLD AUTO: 212 K/UL (ref 164–446)
PLATELET # BLD AUTO: 214 K/UL (ref 164–446)
PLATELET # BLD AUTO: 227 K/UL (ref 164–446)
PLATELET # BLD AUTO: 232 K/UL (ref 164–446)
PLATELET # BLD AUTO: 235 K/UL (ref 164–446)
PLATELET # BLD AUTO: 333 K/UL (ref 164–446)
PLATELET BLD QL SMEAR: NORMAL
PMV BLD AUTO: 10.1 FL (ref 9–12.9)
PMV BLD AUTO: 10.3 FL (ref 9–12.9)
PMV BLD AUTO: 10.6 FL (ref 9–12.9)
PMV BLD AUTO: 10.7 FL (ref 9–12.9)
PMV BLD AUTO: 10.7 FL (ref 9–12.9)
PMV BLD AUTO: 11 FL (ref 9–12.9)
PMV BLD AUTO: 11 FL (ref 9–12.9)
PMV BLD AUTO: 11.1 FL (ref 9–12.9)
PMV BLD AUTO: 11.1 FL (ref 9–12.9)
PMV BLD AUTO: 11.3 FL (ref 9–12.9)
PMV BLD AUTO: 11.4 FL (ref 9–12.9)
PMV BLD AUTO: 11.6 FL (ref 9–12.9)
PMV BLD AUTO: 11.7 FL (ref 9–12.9)
POIKILOCYTOSIS BLD QL SMEAR: NORMAL
POLYCHROMASIA BLD QL SMEAR: NORMAL
POTASSIUM SERPL-SCNC: 3.3 MMOL/L (ref 3.6–5.5)
POTASSIUM SERPL-SCNC: 3.5 MMOL/L (ref 3.6–5.5)
POTASSIUM SERPL-SCNC: 3.7 MMOL/L (ref 3.6–5.5)
POTASSIUM SERPL-SCNC: 3.8 MMOL/L (ref 3.6–5.5)
POTASSIUM SERPL-SCNC: 3.9 MMOL/L (ref 3.6–5.5)
POTASSIUM SERPL-SCNC: 3.9 MMOL/L (ref 3.6–5.5)
POTASSIUM SERPL-SCNC: 4 MMOL/L (ref 3.6–5.5)
POTASSIUM SERPL-SCNC: 4.1 MMOL/L (ref 3.6–5.5)
POTASSIUM SERPL-SCNC: 4.5 MMOL/L (ref 3.6–5.5)
POTASSIUM SERPL-SCNC: 4.5 MMOL/L (ref 3.6–5.5)
POTASSIUM SERPL-SCNC: 4.7 MMOL/L (ref 3.6–5.5)
POTASSIUM SERPL-SCNC: 4.8 MMOL/L (ref 3.6–5.5)
POTASSIUM SERPL-SCNC: 5.3 MMOL/L (ref 3.6–5.5)
PROMYELOCYTES NFR BLD MANUAL: 1.7 %
PROPOXYPH UR QL SCN: NEGATIVE
PROT SERPL-MCNC: 5.5 G/DL (ref 6–8.2)
PROT SERPL-MCNC: 5.6 G/DL (ref 6–8.2)
PROT SERPL-MCNC: 6.1 G/DL (ref 6–8.2)
PROT SERPL-MCNC: 6.1 G/DL (ref 6–8.2)
PROT SERPL-MCNC: 6.4 G/DL (ref 6–8.2)
PROT SERPL-MCNC: 6.6 G/DL (ref 6–8.2)
PROT SERPL-MCNC: 6.6 G/DL (ref 6–8.2)
PROT SERPL-MCNC: 6.9 G/DL (ref 6–8.2)
PROT UR QL STRIP: 100 MG/DL
PROT UR QL STRIP: 200 MG/DL
PROT UR QL STRIP: 200 MG/DL
PROTHROMBIN TIME: 13.4 SEC (ref 12–14.6)
PROTHROMBIN TIME: 13.6 SEC (ref 12–14.6)
PROTHROMBIN TIME: 16.7 SEC (ref 12–14.6)
RBC # BLD AUTO: 4.08 M/UL (ref 4.7–6.1)
RBC # BLD AUTO: 4.11 M/UL (ref 4.7–6.1)
RBC # BLD AUTO: 4.41 M/UL (ref 4.7–6.1)
RBC # BLD AUTO: 4.44 M/UL (ref 4.7–6.1)
RBC # BLD AUTO: 4.46 M/UL (ref 4.7–6.1)
RBC # BLD AUTO: 4.56 M/UL (ref 4.7–6.1)
RBC # BLD AUTO: 4.7 M/UL (ref 4.7–6.1)
RBC # BLD AUTO: 4.76 M/UL (ref 4.7–6.1)
RBC # BLD AUTO: 4.78 M/UL (ref 4.7–6.1)
RBC # BLD AUTO: 5.01 M/UL (ref 4.7–6.1)
RBC # BLD AUTO: 5.05 M/UL (ref 4.7–6.1)
RBC # BLD AUTO: 5.08 M/UL (ref 4.7–6.1)
RBC # BLD AUTO: 5.13 M/UL (ref 4.7–6.1)
RBC # URNS HPF: ABNORMAL /HPF
RBC BLD AUTO: PRESENT
RBC UR QL AUTO: ABNORMAL
RBC UR QL AUTO: NEGATIVE
SCHISTOCYTES BLD QL SMEAR: NORMAL
SIGNIFICANT IND 70042: NORMAL
SITE SITE: NORMAL
SODIUM SERPL-SCNC: 118 MMOL/L (ref 135–145)
SODIUM SERPL-SCNC: 127 MMOL/L (ref 135–145)
SODIUM SERPL-SCNC: 131 MMOL/L (ref 135–145)
SODIUM SERPL-SCNC: 134 MMOL/L (ref 135–145)
SODIUM SERPL-SCNC: 135 MMOL/L (ref 135–145)
SODIUM SERPL-SCNC: 135 MMOL/L (ref 135–145)
SODIUM SERPL-SCNC: 136 MMOL/L (ref 135–145)
SODIUM SERPL-SCNC: 136 MMOL/L (ref 135–145)
SODIUM SERPL-SCNC: 137 MMOL/L (ref 135–145)
SODIUM SERPL-SCNC: 139 MMOL/L (ref 135–145)
SODIUM SERPL-SCNC: 140 MMOL/L (ref 135–145)
SODIUM SERPL-SCNC: 141 MMOL/L (ref 135–145)
SOURCE SOURCE: NORMAL
SP GR UR STRIP.AUTO: 1.01
SP GR UR STRIP.AUTO: 1.01
SP GR UR STRIP.AUTO: 1.02
SPERM #/AREA URNS HPF: PRESENT /HPF
SPHEROCYTES BLD QL SMEAR: NORMAL
TIBC SERPL-MCNC: 214 UG/DL (ref 250–450)
TOXIC GRANULES BLD QL SMEAR: SLIGHT
TRANSFERRIN SERPL-MCNC: 152 MG/DL (ref 200–370)
TRIGL SERPL-MCNC: 115 MG/DL (ref 0–149)
TROPONIN I SERPL-MCNC: 0.02 NG/ML (ref 0–0.04)
TROPONIN I SERPL-MCNC: 0.09 NG/ML (ref 0–0.04)
TROPONIN I SERPL-MCNC: 0.69 NG/ML (ref 0–0.04)
TROPONIN I SERPL-MCNC: <0.01 NG/ML (ref 0–0.04)
TROPONIN I SERPL-MCNC: <0.01 NG/ML (ref 0–0.04)
TSH SERPL DL<=0.005 MIU/L-ACNC: 1.44 UIU/ML (ref 0.3–3.7)
TSH SERPL DL<=0.005 MIU/L-ACNC: 3.65 UIU/ML (ref 0.3–3.7)
VARIANT LYMPHS BLD QL SMEAR: NORMAL
VIT B12 SERPL-MCNC: 524 PG/ML (ref 211–911)
WBC # BLD AUTO: 11.1 K/UL (ref 4.8–10.8)
WBC # BLD AUTO: 12 K/UL (ref 4.8–10.8)
WBC # BLD AUTO: 12 K/UL (ref 4.8–10.8)
WBC # BLD AUTO: 12.3 K/UL (ref 4.8–10.8)
WBC # BLD AUTO: 12.4 K/UL (ref 4.8–10.8)
WBC # BLD AUTO: 13.1 K/UL (ref 4.8–10.8)
WBC # BLD AUTO: 14 K/UL (ref 4.8–10.8)
WBC # BLD AUTO: 26.2 K/UL (ref 4.8–10.8)
WBC # BLD AUTO: 6.8 K/UL (ref 4.8–10.8)
WBC # BLD AUTO: 7.2 K/UL (ref 4.8–10.8)
WBC # BLD AUTO: 8.6 K/UL (ref 4.8–10.8)
WBC # BLD AUTO: 8.9 K/UL (ref 4.8–10.8)
WBC # BLD AUTO: 9.9 K/UL (ref 4.8–10.8)
WBC #/AREA URNS HPF: ABNORMAL /HPF
WBC OTHER NFR BLD MANUAL: 3.5 %

## 2017-01-01 PROCEDURE — 303105 HCHG CATHETER EXTRA

## 2017-01-01 PROCEDURE — 700111 HCHG RX REV CODE 636 W/ 250 OVERRIDE (IP): Performed by: HOSPITALIST

## 2017-01-01 PROCEDURE — 700111 HCHG RX REV CODE 636 W/ 250 OVERRIDE (IP): Performed by: EMERGENCY MEDICINE

## 2017-01-01 PROCEDURE — 99223 1ST HOSP IP/OBS HIGH 75: CPT | Mod: GC | Performed by: INTERNAL MEDICINE

## 2017-01-01 PROCEDURE — 97165 OT EVAL LOW COMPLEX 30 MIN: CPT

## 2017-01-01 PROCEDURE — A9270 NON-COVERED ITEM OR SERVICE: HCPCS | Performed by: INTERNAL MEDICINE

## 2017-01-01 PROCEDURE — 700102 HCHG RX REV CODE 250 W/ 637 OVERRIDE(OP): Performed by: INTERNAL MEDICINE

## 2017-01-01 PROCEDURE — 700102 HCHG RX REV CODE 250 W/ 637 OVERRIDE(OP): Performed by: HOSPITALIST

## 2017-01-01 PROCEDURE — 83605 ASSAY OF LACTIC ACID: CPT

## 2017-01-01 PROCEDURE — 770020 HCHG ROOM/CARE - TELE (206)

## 2017-01-01 PROCEDURE — 700111 HCHG RX REV CODE 636 W/ 250 OVERRIDE (IP): Performed by: INTERNAL MEDICINE

## 2017-01-01 PROCEDURE — 82962 GLUCOSE BLOOD TEST: CPT | Mod: 91

## 2017-01-01 PROCEDURE — 82728 ASSAY OF FERRITIN: CPT

## 2017-01-01 PROCEDURE — 93005 ELECTROCARDIOGRAM TRACING: CPT

## 2017-01-01 PROCEDURE — 85610 PROTHROMBIN TIME: CPT

## 2017-01-01 PROCEDURE — 84484 ASSAY OF TROPONIN QUANT: CPT

## 2017-01-01 PROCEDURE — 93000 ELECTROCARDIOGRAM COMPLETE: CPT | Performed by: INTERNAL MEDICINE

## 2017-01-01 PROCEDURE — 80048 BASIC METABOLIC PNL TOTAL CA: CPT

## 2017-01-01 PROCEDURE — 32555 ASPIRATE PLEURA W/ IMAGING: CPT | Mod: RT

## 2017-01-01 PROCEDURE — A9270 NON-COVERED ITEM OR SERVICE: HCPCS | Performed by: EMERGENCY MEDICINE

## 2017-01-01 PROCEDURE — 36415 COLL VENOUS BLD VENIPUNCTURE: CPT

## 2017-01-01 PROCEDURE — 99291 CRITICAL CARE FIRST HOUR: CPT

## 2017-01-01 PROCEDURE — 85025 COMPLETE CBC W/AUTO DIFF WBC: CPT

## 2017-01-01 PROCEDURE — 93005 ELECTROCARDIOGRAM TRACING: CPT | Performed by: EMERGENCY MEDICINE

## 2017-01-01 PROCEDURE — 83036 HEMOGLOBIN GLYCOSYLATED A1C: CPT

## 2017-01-01 PROCEDURE — 99214 OFFICE O/P EST MOD 30 MIN: CPT | Performed by: NURSE PRACTITIONER

## 2017-01-01 PROCEDURE — 700117 HCHG RX CONTRAST REV CODE 255: Performed by: HOSPITALIST

## 2017-01-01 PROCEDURE — 87086 URINE CULTURE/COLONY COUNT: CPT

## 2017-01-01 PROCEDURE — 99233 SBSQ HOSP IP/OBS HIGH 50: CPT | Mod: GC | Performed by: INTERNAL MEDICINE

## 2017-01-01 PROCEDURE — 85730 THROMBOPLASTIN TIME PARTIAL: CPT

## 2017-01-01 PROCEDURE — 92950 HEART/LUNG RESUSCITATION CPR: CPT

## 2017-01-01 PROCEDURE — 304561 HCHG STAT O2

## 2017-01-01 PROCEDURE — 700105 HCHG RX REV CODE 258: Performed by: EMERGENCY MEDICINE

## 2017-01-01 PROCEDURE — 99239 HOSP IP/OBS DSCHRG MGMT >30: CPT | Performed by: INTERNAL MEDICINE

## 2017-01-01 PROCEDURE — 99285 EMERGENCY DEPT VISIT HI MDM: CPT

## 2017-01-01 PROCEDURE — 94760 N-INVAS EAR/PLS OXIMETRY 1: CPT

## 2017-01-01 PROCEDURE — 80053 COMPREHEN METABOLIC PANEL: CPT

## 2017-01-01 PROCEDURE — A9579 GAD-BASE MR CONTRAST NOS,1ML: HCPCS | Performed by: HOSPITALIST

## 2017-01-01 PROCEDURE — 94640 AIRWAY INHALATION TREATMENT: CPT

## 2017-01-01 PROCEDURE — 82962 GLUCOSE BLOOD TEST: CPT

## 2017-01-01 PROCEDURE — 83735 ASSAY OF MAGNESIUM: CPT

## 2017-01-01 PROCEDURE — 99232 SBSQ HOSP IP/OBS MODERATE 35: CPT | Performed by: INTERNAL MEDICINE

## 2017-01-01 PROCEDURE — 85007 BL SMEAR W/DIFF WBC COUNT: CPT

## 2017-01-01 PROCEDURE — 84443 ASSAY THYROID STIM HORMONE: CPT

## 2017-01-01 PROCEDURE — 71010 DX-CHEST-PORTABLE (1 VIEW): CPT

## 2017-01-01 PROCEDURE — 700102 HCHG RX REV CODE 250 W/ 637 OVERRIDE(OP): Performed by: EMERGENCY MEDICINE

## 2017-01-01 PROCEDURE — 85027 COMPLETE CBC AUTOMATED: CPT

## 2017-01-01 PROCEDURE — 700101 HCHG RX REV CODE 250: Performed by: INTERNAL MEDICINE

## 2017-01-01 PROCEDURE — 92250 FUNDUS PHOTOGRAPHY W/I&R: CPT | Mod: TC | Performed by: NURSE PRACTITIONER

## 2017-01-01 PROCEDURE — A9270 NON-COVERED ITEM OR SERVICE: HCPCS | Performed by: HOSPITALIST

## 2017-01-01 PROCEDURE — 99232 SBSQ HOSP IP/OBS MODERATE 35: CPT | Performed by: HOSPITALIST

## 2017-01-01 PROCEDURE — G8980 MOBILITY D/C STATUS: HCPCS | Mod: CI

## 2017-01-01 PROCEDURE — 96374 THER/PROPH/DIAG INJ IV PUSH: CPT

## 2017-01-01 PROCEDURE — 770006 HCHG ROOM/CARE - MED/SURG/GYN SEMI*

## 2017-01-01 PROCEDURE — 94002 VENT MGMT INPAT INIT DAY: CPT

## 2017-01-01 PROCEDURE — 81001 URINALYSIS AUTO W/SCOPE: CPT

## 2017-01-01 PROCEDURE — 76604 US EXAM CHEST: CPT

## 2017-01-01 PROCEDURE — 93005 ELECTROCARDIOGRAM TRACING: CPT | Performed by: INTERNAL MEDICINE

## 2017-01-01 PROCEDURE — 87186 SC STD MICRODIL/AGAR DIL: CPT

## 2017-01-01 PROCEDURE — 88112 CYTOPATH CELL ENHANCE TECH: CPT

## 2017-01-01 PROCEDURE — 700101 HCHG RX REV CODE 250: Performed by: HOSPITALIST

## 2017-01-01 PROCEDURE — 83880 ASSAY OF NATRIURETIC PEPTIDE: CPT

## 2017-01-01 PROCEDURE — G8987 SELF CARE CURRENT STATUS: HCPCS | Mod: CJ

## 2017-01-01 PROCEDURE — 99215 OFFICE O/P EST HI 40 MIN: CPT | Performed by: INTERNAL MEDICINE

## 2017-01-01 PROCEDURE — 71010 DX-CHEST-LIMITED (1 VIEW): CPT

## 2017-01-01 PROCEDURE — 71020 DX-CHEST-2 VIEWS: CPT

## 2017-01-01 PROCEDURE — 96375 TX/PRO/DX INJ NEW DRUG ADDON: CPT

## 2017-01-01 PROCEDURE — 97530 THERAPEUTIC ACTIVITIES: CPT

## 2017-01-01 PROCEDURE — 99232 SBSQ HOSP IP/OBS MODERATE 35: CPT | Mod: 25 | Performed by: INTERNAL MEDICINE

## 2017-01-01 PROCEDURE — 84466 ASSAY OF TRANSFERRIN: CPT

## 2017-01-01 PROCEDURE — 96372 THER/PROPH/DIAG INJ SC/IM: CPT

## 2017-01-01 PROCEDURE — 99223 1ST HOSP IP/OBS HIGH 75: CPT | Performed by: INTERNAL MEDICINE

## 2017-01-01 PROCEDURE — 0W993ZX DRAINAGE OF RIGHT PLEURAL CAVITY, PERCUTANEOUS APPROACH, DIAGNOSTIC: ICD-10-PCS | Performed by: RADIOLOGY

## 2017-01-01 PROCEDURE — G8978 MOBILITY CURRENT STATUS: HCPCS | Mod: CI

## 2017-01-01 PROCEDURE — 700105 HCHG RX REV CODE 258: Performed by: HOSPITALIST

## 2017-01-01 PROCEDURE — 84100 ASSAY OF PHOSPHORUS: CPT

## 2017-01-01 PROCEDURE — 80307 DRUG TEST PRSMV CHEM ANLYZR: CPT

## 2017-01-01 PROCEDURE — G8988 SELF CARE GOAL STATUS: HCPCS | Mod: CI

## 2017-01-01 PROCEDURE — 99223 1ST HOSP IP/OBS HIGH 75: CPT | Mod: 25 | Performed by: INTERNAL MEDICINE

## 2017-01-01 PROCEDURE — 700112 HCHG RX REV CODE 229: Performed by: INTERNAL MEDICINE

## 2017-01-01 PROCEDURE — 99222 1ST HOSP IP/OBS MODERATE 55: CPT | Performed by: INTERNAL MEDICINE

## 2017-01-01 PROCEDURE — 83550 IRON BINDING TEST: CPT

## 2017-01-01 PROCEDURE — 700101 HCHG RX REV CODE 250: Performed by: EMERGENCY MEDICINE

## 2017-01-01 PROCEDURE — 87040 BLOOD CULTURE FOR BACTERIA: CPT

## 2017-01-01 PROCEDURE — 87015 SPECIMEN INFECT AGNT CONCNTJ: CPT

## 2017-01-01 PROCEDURE — 0W993ZZ DRAINAGE OF RIGHT PLEURAL CAVITY, PERCUTANEOUS APPROACH: ICD-10-PCS | Performed by: RADIOLOGY

## 2017-01-01 PROCEDURE — 87070 CULTURE OTHR SPECIMN AEROBIC: CPT

## 2017-01-01 PROCEDURE — 83540 ASSAY OF IRON: CPT

## 2017-01-01 PROCEDURE — 99233 SBSQ HOSP IP/OBS HIGH 50: CPT | Performed by: HOSPITALIST

## 2017-01-01 PROCEDURE — 82746 ASSAY OF FOLIC ACID SERUM: CPT

## 2017-01-01 PROCEDURE — 71275 CT ANGIOGRAPHY CHEST: CPT

## 2017-01-01 PROCEDURE — G8979 MOBILITY GOAL STATUS: HCPCS | Mod: CI

## 2017-01-01 PROCEDURE — 99232 SBSQ HOSP IP/OBS MODERATE 35: CPT | Mod: GC | Performed by: INTERNAL MEDICINE

## 2017-01-01 PROCEDURE — 83690 ASSAY OF LIPASE: CPT

## 2017-01-01 PROCEDURE — 82947 ASSAY GLUCOSE BLOOD QUANT: CPT

## 2017-01-01 PROCEDURE — 97535 SELF CARE MNGMENT TRAINING: CPT

## 2017-01-01 PROCEDURE — 87205 SMEAR GRAM STAIN: CPT

## 2017-01-01 PROCEDURE — 51702 INSERT TEMP BLADDER CATH: CPT

## 2017-01-01 PROCEDURE — 99215 OFFICE O/P EST HI 40 MIN: CPT | Performed by: NURSE PRACTITIONER

## 2017-01-01 PROCEDURE — 93010 ELECTROCARDIOGRAM REPORT: CPT | Performed by: INTERNAL MEDICINE

## 2017-01-01 PROCEDURE — 70450 CT HEAD/BRAIN W/O DYE: CPT

## 2017-01-01 PROCEDURE — G8978 MOBILITY CURRENT STATUS: HCPCS | Mod: CJ

## 2017-01-01 PROCEDURE — 99239 HOSP IP/OBS DSCHRG MGMT >30: CPT | Mod: GC | Performed by: INTERNAL MEDICINE

## 2017-01-01 PROCEDURE — 80061 LIPID PANEL: CPT

## 2017-01-01 PROCEDURE — 70553 MRI BRAIN STEM W/O & W/DYE: CPT

## 2017-01-01 PROCEDURE — 96365 THER/PROPH/DIAG IV INF INIT: CPT

## 2017-01-01 PROCEDURE — 97162 PT EVAL MOD COMPLEX 30 MIN: CPT

## 2017-01-01 PROCEDURE — 99239 HOSP IP/OBS DSCHRG MGMT >30: CPT | Performed by: HOSPITALIST

## 2017-01-01 PROCEDURE — 97161 PT EVAL LOW COMPLEX 20 MIN: CPT

## 2017-01-01 PROCEDURE — 71250 CT THORAX DX C-: CPT

## 2017-01-01 PROCEDURE — 99203 OFFICE O/P NEW LOW 30 MIN: CPT | Performed by: NURSE PRACTITIONER

## 2017-01-01 PROCEDURE — 304538 HCHG NG TUBE

## 2017-01-01 PROCEDURE — 82607 VITAMIN B-12: CPT

## 2017-01-01 PROCEDURE — 304562 HCHG STAT O2 MASK/CANNULA

## 2017-01-01 PROCEDURE — 87077 CULTURE AEROBIC IDENTIFY: CPT

## 2017-01-01 RX ORDER — INSULIN GLARGINE 100 [IU]/ML
15 INJECTION, SOLUTION SUBCUTANEOUS EVERY EVENING
Status: DISCONTINUED | OUTPATIENT
Start: 2017-01-01 | End: 2017-01-01 | Stop reason: HOSPADM

## 2017-01-01 RX ORDER — OMEPRAZOLE 20 MG/1
20 CAPSULE, DELAYED RELEASE ORAL DAILY
Status: DISCONTINUED | OUTPATIENT
Start: 2017-01-01 | End: 2017-01-01 | Stop reason: HOSPADM

## 2017-01-01 RX ORDER — INSULIN GLARGINE 100 [IU]/ML
10 INJECTION, SOLUTION SUBCUTANEOUS EVERY EVENING
Status: DISCONTINUED | OUTPATIENT
Start: 2017-01-01 | End: 2017-01-01

## 2017-01-01 RX ORDER — INSULIN GLARGINE 100 [IU]/ML
15 INJECTION, SOLUTION SUBCUTANEOUS EVERY EVENING
Qty: 10 ML | Refills: 2 | Status: SHIPPED | OUTPATIENT
Start: 2017-01-01 | End: 2017-01-01 | Stop reason: SDUPTHER

## 2017-01-01 RX ORDER — ALBUTEROL SULFATE 2.5 MG/3ML
2.5 SOLUTION RESPIRATORY (INHALATION)
Status: DISCONTINUED | OUTPATIENT
Start: 2017-01-01 | End: 2017-01-01 | Stop reason: HOSPADM

## 2017-01-01 RX ORDER — CARVEDILOL 12.5 MG/1
12.5 TABLET ORAL 2 TIMES DAILY WITH MEALS
Qty: 60 TAB | Refills: 11 | Status: SHIPPED | OUTPATIENT
Start: 2017-01-01 | End: 2017-01-01 | Stop reason: SDUPTHER

## 2017-01-01 RX ORDER — DOCUSATE SODIUM 100 MG/1
100 CAPSULE, LIQUID FILLED ORAL EVERY MORNING
Status: DISCONTINUED | OUTPATIENT
Start: 2017-01-01 | End: 2017-01-01 | Stop reason: HOSPADM

## 2017-01-01 RX ORDER — LORAZEPAM 2 MG/ML
2 INJECTION INTRAMUSCULAR ONCE
Status: COMPLETED | OUTPATIENT
Start: 2017-01-01 | End: 2017-01-01

## 2017-01-01 RX ORDER — FUROSEMIDE 40 MG/1
40 TABLET ORAL DAILY
Qty: 120 TAB | Refills: 3 | Status: SHIPPED | OUTPATIENT
Start: 2017-01-01 | End: 2017-01-01 | Stop reason: SDUPTHER

## 2017-01-01 RX ORDER — POTASSIUM CHLORIDE 750 MG/1
10 TABLET, EXTENDED RELEASE ORAL DAILY
Qty: 120 TAB | Refills: 3 | Status: SHIPPED | OUTPATIENT
Start: 2017-01-01 | End: 2017-01-01

## 2017-01-01 RX ORDER — ACETAMINOPHEN 325 MG/1
650 TABLET ORAL EVERY 6 HOURS PRN
Status: DISCONTINUED | OUTPATIENT
Start: 2017-01-01 | End: 2017-01-01 | Stop reason: HOSPADM

## 2017-01-01 RX ORDER — LEVOFLOXACIN 500 MG/1
500 TABLET, FILM COATED ORAL DAILY
Qty: 5 TAB | Refills: 0 | Status: SHIPPED | OUTPATIENT
Start: 2017-01-01 | End: 2017-01-01

## 2017-01-01 RX ORDER — FUROSEMIDE 40 MG/1
40 TABLET ORAL
Status: DISCONTINUED | OUTPATIENT
Start: 2017-01-01 | End: 2017-01-01

## 2017-01-01 RX ORDER — SPIRONOLACTONE 25 MG/1
25 TABLET ORAL DAILY
Qty: 30 TAB | Refills: 11 | Status: SHIPPED | OUTPATIENT
Start: 2017-01-01 | End: 2017-01-01 | Stop reason: SDUPTHER

## 2017-01-01 RX ORDER — ASPIRIN 325 MG
325 TABLET ORAL ONCE
Status: COMPLETED | OUTPATIENT
Start: 2017-01-01 | End: 2017-01-01

## 2017-01-01 RX ORDER — PROMETHAZINE HYDROCHLORIDE 25 MG/1
12.5-25 SUPPOSITORY RECTAL EVERY 4 HOURS PRN
Status: DISCONTINUED | OUTPATIENT
Start: 2017-01-01 | End: 2017-01-01 | Stop reason: HOSPADM

## 2017-01-01 RX ORDER — ATORVASTATIN CALCIUM 40 MG/1
40 TABLET, FILM COATED ORAL
Qty: 30 TAB | Refills: 3 | Status: SHIPPED | OUTPATIENT
Start: 2017-01-01 | End: 2017-01-01 | Stop reason: SDUPTHER

## 2017-01-01 RX ORDER — SPIRONOLACTONE 25 MG/1
25 TABLET ORAL 2 TIMES DAILY
Qty: 60 TAB | Refills: 11 | Status: SHIPPED | OUTPATIENT
Start: 2017-01-01 | End: 2017-01-01 | Stop reason: SDUPTHER

## 2017-01-01 RX ORDER — FUROSEMIDE 40 MG/1
40 TABLET ORAL DAILY
Qty: 60 TAB | Refills: 11 | Status: SHIPPED | OUTPATIENT
Start: 2017-01-01 | End: 2017-01-01 | Stop reason: SDUPTHER

## 2017-01-01 RX ORDER — FUROSEMIDE 10 MG/ML
40 INJECTION INTRAMUSCULAR; INTRAVENOUS ONCE
Status: COMPLETED | OUTPATIENT
Start: 2017-01-01 | End: 2017-01-01

## 2017-01-01 RX ORDER — FERROUS SULFATE 325(65) MG
325 TABLET ORAL
Qty: 30 TAB | Refills: 3 | Status: SHIPPED | OUTPATIENT
Start: 2017-01-01 | End: 2017-01-01

## 2017-01-01 RX ORDER — FERROUS SULFATE 325(65) MG
325 TABLET ORAL
Status: DISCONTINUED | OUTPATIENT
Start: 2017-01-01 | End: 2017-01-01 | Stop reason: HOSPADM

## 2017-01-01 RX ORDER — EPINEPHRINE 0.1 MG/ML
SYRINGE (ML) INJECTION
Status: COMPLETED | OUTPATIENT
Start: 2017-01-01 | End: 2017-01-01

## 2017-01-01 RX ORDER — POTASSIUM CHLORIDE 20 MEQ/1
20 TABLET, EXTENDED RELEASE ORAL DAILY
Status: DISCONTINUED | OUTPATIENT
Start: 2017-01-01 | End: 2017-01-01

## 2017-01-01 RX ORDER — ATORVASTATIN CALCIUM 40 MG/1
40 TABLET, FILM COATED ORAL EVERY EVENING
Status: DISCONTINUED | OUTPATIENT
Start: 2017-01-01 | End: 2017-01-01 | Stop reason: HOSPADM

## 2017-01-01 RX ORDER — AZITHROMYCIN 250 MG/1
500 TABLET, FILM COATED ORAL ONCE
Status: COMPLETED | OUTPATIENT
Start: 2017-01-01 | End: 2017-01-01

## 2017-01-01 RX ORDER — CARVEDILOL 25 MG/1
25 TABLET ORAL 2 TIMES DAILY WITH MEALS
Qty: 60 TAB | Refills: 11 | Status: SHIPPED | OUTPATIENT
Start: 2017-01-01

## 2017-01-01 RX ORDER — ASPIRIN 81 MG/1
81 TABLET ORAL DAILY
Qty: 30 TAB | Refills: 3 | Status: SHIPPED | OUTPATIENT
Start: 2017-01-01 | End: 2017-01-01 | Stop reason: SDUPTHER

## 2017-01-01 RX ORDER — ATORVASTATIN CALCIUM 40 MG/1
40 TABLET, FILM COATED ORAL EVERY EVENING
Qty: 30 TAB | Refills: 11 | Status: SHIPPED | OUTPATIENT
Start: 2017-01-01 | End: 2017-01-01 | Stop reason: SDUPTHER

## 2017-01-01 RX ORDER — ASPIRIN 81 MG/1
81 TABLET ORAL DAILY
Qty: 30 TAB | Refills: 3 | Status: SHIPPED | OUTPATIENT
Start: 2017-01-01

## 2017-01-01 RX ORDER — LISINOPRIL 20 MG/1
20 TABLET ORAL DAILY
Qty: 30 TAB | Refills: 3 | Status: SHIPPED | OUTPATIENT
Start: 2017-01-01 | End: 2017-01-01 | Stop reason: SDUPTHER

## 2017-01-01 RX ORDER — PREDNISONE 10 MG/1
10 TABLET ORAL DAILY
Qty: 18 TAB | Refills: 0 | Status: SHIPPED | OUTPATIENT
Start: 2017-01-01 | End: 2017-01-01

## 2017-01-01 RX ORDER — FUROSEMIDE 40 MG/1
40 TABLET ORAL DAILY
Qty: 30 TAB | Refills: 3 | Status: SHIPPED | OUTPATIENT
Start: 2017-01-01 | End: 2017-01-01 | Stop reason: SDUPTHER

## 2017-01-01 RX ORDER — LISINOPRIL 10 MG/1
10 TABLET ORAL
Status: DISCONTINUED | OUTPATIENT
Start: 2017-01-01 | End: 2017-01-01

## 2017-01-01 RX ORDER — CARVEDILOL 6.25 MG/1
6.25 TABLET ORAL 2 TIMES DAILY WITH MEALS
Qty: 60 TAB | Refills: 11 | Status: SHIPPED | OUTPATIENT
Start: 2017-01-01 | End: 2017-01-01

## 2017-01-01 RX ORDER — CARVEDILOL 6.25 MG/1
6.25 TABLET ORAL 2 TIMES DAILY WITH MEALS
Qty: 60 TAB | Refills: 3 | Status: SHIPPED | OUTPATIENT
Start: 2017-01-01 | End: 2017-01-01 | Stop reason: SDUPTHER

## 2017-01-01 RX ORDER — TEMAZEPAM 15 MG/1
15 CAPSULE ORAL
Status: DISCONTINUED | OUTPATIENT
Start: 2017-01-01 | End: 2017-01-01 | Stop reason: HOSPADM

## 2017-01-01 RX ORDER — OMEPRAZOLE 20 MG/1
20 CAPSULE, DELAYED RELEASE ORAL DAILY
Qty: 30 CAP | Refills: 11 | Status: SHIPPED | OUTPATIENT
Start: 2017-01-01 | End: 2017-01-01 | Stop reason: SDUPTHER

## 2017-01-01 RX ORDER — DEXTROSE MONOHYDRATE 25 G/50ML
25 INJECTION, SOLUTION INTRAVENOUS
Status: DISCONTINUED | OUTPATIENT
Start: 2017-01-01 | End: 2017-01-01 | Stop reason: HOSPADM

## 2017-01-01 RX ORDER — AMOXICILLIN 250 MG
2 CAPSULE ORAL 2 TIMES DAILY
Status: DISCONTINUED | OUTPATIENT
Start: 2017-01-01 | End: 2017-01-01 | Stop reason: HOSPADM

## 2017-01-01 RX ORDER — ONDANSETRON 4 MG/1
4 TABLET, ORALLY DISINTEGRATING ORAL EVERY 4 HOURS PRN
Status: DISCONTINUED | OUTPATIENT
Start: 2017-01-01 | End: 2017-01-01 | Stop reason: HOSPADM

## 2017-01-01 RX ORDER — CARVEDILOL 12.5 MG/1
12.5 TABLET ORAL 2 TIMES DAILY WITH MEALS
Qty: 60 TAB | Refills: 11 | Status: SHIPPED | OUTPATIENT
Start: 2017-01-01 | End: 2017-01-01

## 2017-01-01 RX ORDER — SODIUM CHLORIDE AND POTASSIUM CHLORIDE 150; 900 MG/100ML; MG/100ML
INJECTION, SOLUTION INTRAVENOUS CONTINUOUS
Status: DISCONTINUED | OUTPATIENT
Start: 2017-01-01 | End: 2017-01-01

## 2017-01-01 RX ORDER — DEXTROSE MONOHYDRATE 25 G/50ML
25 INJECTION, SOLUTION INTRAVENOUS
Status: DISCONTINUED | OUTPATIENT
Start: 2017-01-01 | End: 2017-01-01

## 2017-01-01 RX ORDER — LISINOPRIL 20 MG/1
20 TABLET ORAL DAILY
Status: DISCONTINUED | OUTPATIENT
Start: 2017-01-01 | End: 2017-01-01 | Stop reason: HOSPADM

## 2017-01-01 RX ORDER — HEPARIN SODIUM 5000 [USP'U]/ML
5000 INJECTION, SOLUTION INTRAVENOUS; SUBCUTANEOUS EVERY 8 HOURS
Status: DISCONTINUED | OUTPATIENT
Start: 2017-01-01 | End: 2017-01-01

## 2017-01-01 RX ORDER — POTASSIUM CHLORIDE 750 MG/1
30 TABLET, FILM COATED, EXTENDED RELEASE ORAL ONCE
Status: COMPLETED | OUTPATIENT
Start: 2017-01-01 | End: 2017-01-01

## 2017-01-01 RX ORDER — POTASSIUM CHLORIDE 750 MG/1
10 TABLET, EXTENDED RELEASE ORAL DAILY
Qty: 100 TAB | Refills: 3 | Status: SHIPPED | OUTPATIENT
Start: 2017-01-01 | End: 2017-01-01 | Stop reason: SDUPTHER

## 2017-01-01 RX ORDER — ATORVASTATIN CALCIUM 40 MG/1
40 TABLET, FILM COATED ORAL EVERY EVENING
Qty: 30 TAB | Refills: 11 | Status: SHIPPED | OUTPATIENT
Start: 2017-01-01

## 2017-01-01 RX ORDER — INSULIN GLARGINE 100 [IU]/ML
20 INJECTION, SOLUTION SUBCUTANEOUS EVERY EVENING
Status: DISCONTINUED | OUTPATIENT
Start: 2017-01-01 | End: 2017-01-01

## 2017-01-01 RX ORDER — INSULIN GLARGINE 100 [IU]/ML
15 INJECTION, SOLUTION SUBCUTANEOUS EVERY EVENING
Status: DISCONTINUED | OUTPATIENT
Start: 2017-01-01 | End: 2017-01-01

## 2017-01-01 RX ORDER — LISINOPRIL 20 MG/1
20 TABLET ORAL DAILY
Qty: 30 TAB | Refills: 11 | Status: SHIPPED | OUTPATIENT
Start: 2017-01-01

## 2017-01-01 RX ORDER — AMOXICILLIN AND CLAVULANATE POTASSIUM 875; 125 MG/1; MG/1
1 TABLET, FILM COATED ORAL EVERY 12 HOURS
Status: DISCONTINUED | OUTPATIENT
Start: 2017-01-01 | End: 2017-01-01 | Stop reason: HOSPADM

## 2017-01-01 RX ORDER — SPIRONOLACTONE 25 MG/1
25 TABLET ORAL DAILY
Qty: 30 TAB | Refills: 11
Start: 2017-01-01 | End: 2017-01-01 | Stop reason: SDUPTHER

## 2017-01-01 RX ORDER — PREDNISONE 20 MG/1
40 TABLET ORAL DAILY
Status: DISCONTINUED | OUTPATIENT
Start: 2017-01-01 | End: 2017-01-01 | Stop reason: HOSPADM

## 2017-01-01 RX ORDER — ONDANSETRON 2 MG/ML
4 INJECTION INTRAMUSCULAR; INTRAVENOUS EVERY 4 HOURS PRN
Status: DISCONTINUED | OUTPATIENT
Start: 2017-01-01 | End: 2017-01-01 | Stop reason: HOSPADM

## 2017-01-01 RX ORDER — ENEMA 19; 7 G/133ML; G/133ML
1 ENEMA RECTAL
Status: DISCONTINUED | OUTPATIENT
Start: 2017-01-01 | End: 2017-01-01 | Stop reason: HOSPADM

## 2017-01-01 RX ORDER — FUROSEMIDE 10 MG/ML
40 INJECTION INTRAMUSCULAR; INTRAVENOUS
Status: DISCONTINUED | OUTPATIENT
Start: 2017-01-01 | End: 2017-01-01 | Stop reason: HOSPADM

## 2017-01-01 RX ORDER — BISACODYL 10 MG
10 SUPPOSITORY, RECTAL RECTAL
Status: DISCONTINUED | OUTPATIENT
Start: 2017-01-01 | End: 2017-01-01 | Stop reason: HOSPADM

## 2017-01-01 RX ORDER — AMOXICILLIN AND CLAVULANATE POTASSIUM 875; 125 MG/1; MG/1
1 TABLET, FILM COATED ORAL EVERY 12 HOURS
Qty: 7 TAB | Refills: 0 | Status: SHIPPED | OUTPATIENT
Start: 2017-01-01 | End: 2017-01-01

## 2017-01-01 RX ORDER — POTASSIUM CHLORIDE 20 MEQ/1
40 TABLET, EXTENDED RELEASE ORAL 2 TIMES DAILY
Status: COMPLETED | OUTPATIENT
Start: 2017-01-01 | End: 2017-01-01

## 2017-01-01 RX ORDER — INSULIN GLARGINE 100 [IU]/ML
17 INJECTION, SOLUTION SUBCUTANEOUS EVERY EVENING
Status: DISCONTINUED | OUTPATIENT
Start: 2017-01-01 | End: 2017-01-01 | Stop reason: HOSPADM

## 2017-01-01 RX ORDER — FUROSEMIDE 40 MG/1
40 TABLET ORAL DAILY
Qty: 100 TAB | Refills: 3 | Status: SHIPPED | OUTPATIENT
Start: 2017-01-01 | End: 2017-01-01 | Stop reason: SDUPTHER

## 2017-01-01 RX ORDER — ATORVASTATIN CALCIUM 40 MG/1
40 TABLET, FILM COATED ORAL
Status: DISCONTINUED | OUTPATIENT
Start: 2017-01-01 | End: 2017-01-01 | Stop reason: HOSPADM

## 2017-01-01 RX ORDER — LACTULOSE 20 G/30ML
30 SOLUTION ORAL
Status: DISCONTINUED | OUTPATIENT
Start: 2017-01-01 | End: 2017-01-01 | Stop reason: HOSPADM

## 2017-01-01 RX ORDER — ALBUTEROL SULFATE 2.5 MG/3ML
2.5 SOLUTION RESPIRATORY (INHALATION)
Status: DISCONTINUED | OUTPATIENT
Start: 2017-01-01 | End: 2017-01-01

## 2017-01-01 RX ORDER — POTASSIUM CHLORIDE 20 MEQ/1
40 TABLET, EXTENDED RELEASE ORAL ONCE
Status: COMPLETED | OUTPATIENT
Start: 2017-01-01 | End: 2017-01-01

## 2017-01-01 RX ORDER — SPIRONOLACTONE 25 MG/1
25 TABLET ORAL
Status: DISCONTINUED | OUTPATIENT
Start: 2017-01-01 | End: 2017-01-01 | Stop reason: HOSPADM

## 2017-01-01 RX ORDER — CARVEDILOL 12.5 MG/1
12.5 TABLET ORAL 2 TIMES DAILY WITH MEALS
Status: DISCONTINUED | OUTPATIENT
Start: 2017-01-01 | End: 2017-01-01

## 2017-01-01 RX ORDER — POTASSIUM CHLORIDE 20 MEQ/1
10 TABLET, EXTENDED RELEASE ORAL DAILY
Status: DISCONTINUED | OUTPATIENT
Start: 2017-01-01 | End: 2017-01-01 | Stop reason: HOSPADM

## 2017-01-01 RX ORDER — GUAIFENESIN/DEXTROMETHORPHAN 100-10MG/5
5 SYRUP ORAL EVERY 6 HOURS PRN
Status: DISCONTINUED | OUTPATIENT
Start: 2017-01-01 | End: 2017-01-01 | Stop reason: HOSPADM

## 2017-01-01 RX ORDER — FUROSEMIDE 40 MG/1
40 TABLET ORAL 2 TIMES DAILY
Qty: 60 TAB | Refills: 3 | Status: SHIPPED | OUTPATIENT
Start: 2017-01-01 | End: 2017-01-01

## 2017-01-01 RX ORDER — SPIRONOLACTONE 25 MG/1
25 TABLET ORAL DAILY
Qty: 30 TAB | Refills: 3 | Status: SHIPPED | OUTPATIENT
Start: 2017-01-01 | End: 2017-01-01 | Stop reason: SDUPTHER

## 2017-01-01 RX ORDER — AMOXICILLIN 250 MG
1 CAPSULE ORAL NIGHTLY
Status: DISCONTINUED | OUTPATIENT
Start: 2017-01-01 | End: 2017-01-01 | Stop reason: HOSPADM

## 2017-01-01 RX ORDER — CARVEDILOL 12.5 MG/1
12.5 TABLET ORAL 2 TIMES DAILY WITH MEALS
Status: DISCONTINUED | OUTPATIENT
Start: 2017-01-01 | End: 2017-01-01 | Stop reason: HOSPADM

## 2017-01-01 RX ORDER — FUROSEMIDE 10 MG/ML
60 INJECTION INTRAMUSCULAR; INTRAVENOUS
Status: DISCONTINUED | OUTPATIENT
Start: 2017-01-01 | End: 2017-01-01

## 2017-01-01 RX ORDER — METHYLPREDNISOLONE SODIUM SUCCINATE 40 MG/ML
40 INJECTION, POWDER, LYOPHILIZED, FOR SOLUTION INTRAMUSCULAR; INTRAVENOUS EVERY 8 HOURS
Status: DISCONTINUED | OUTPATIENT
Start: 2017-01-01 | End: 2017-01-01

## 2017-01-01 RX ORDER — FUROSEMIDE 40 MG/1
40 TABLET ORAL DAILY
Qty: 120 TAB | Refills: 3 | Status: ON HOLD | OUTPATIENT
Start: 2017-01-01 | End: 2017-01-01

## 2017-01-01 RX ORDER — LISINOPRIL 20 MG/1
20 TABLET ORAL
Status: DISCONTINUED | OUTPATIENT
Start: 2017-01-01 | End: 2017-01-01 | Stop reason: HOSPADM

## 2017-01-01 RX ORDER — POLYETHYLENE GLYCOL 3350 17 G/17G
1 POWDER, FOR SOLUTION ORAL
Status: DISCONTINUED | OUTPATIENT
Start: 2017-01-01 | End: 2017-01-01 | Stop reason: HOSPADM

## 2017-01-01 RX ORDER — SPIRONOLACTONE 25 MG/1
25 TABLET ORAL DAILY
Status: DISCONTINUED | OUTPATIENT
Start: 2017-01-01 | End: 2017-01-01 | Stop reason: HOSPADM

## 2017-01-01 RX ORDER — LISINOPRIL 5 MG/1
5 TABLET ORAL
Status: DISCONTINUED | OUTPATIENT
Start: 2017-01-01 | End: 2017-01-01

## 2017-01-01 RX ORDER — INSULIN GLARGINE 100 [IU]/ML
5 INJECTION, SOLUTION SUBCUTANEOUS ONCE
Status: DISCONTINUED | OUTPATIENT
Start: 2017-01-01 | End: 2017-01-01

## 2017-01-01 RX ORDER — METHYLPREDNISOLONE SODIUM SUCCINATE 40 MG/ML
40 INJECTION, POWDER, LYOPHILIZED, FOR SOLUTION INTRAMUSCULAR; INTRAVENOUS EVERY 6 HOURS
Status: DISCONTINUED | OUTPATIENT
Start: 2017-01-01 | End: 2017-01-01

## 2017-01-01 RX ORDER — OMEPRAZOLE 20 MG/1
20 CAPSULE, DELAYED RELEASE ORAL DAILY
Qty: 30 CAP | Refills: 3 | Status: SHIPPED | OUTPATIENT
Start: 2017-01-01 | End: 2017-01-01 | Stop reason: SDUPTHER

## 2017-01-01 RX ORDER — INSULIN GLARGINE 100 [IU]/ML
10 INJECTION, SOLUTION SUBCUTANEOUS ONCE
Status: DISCONTINUED | OUTPATIENT
Start: 2017-01-01 | End: 2017-01-01

## 2017-01-01 RX ORDER — POTASSIUM CHLORIDE 750 MG/1
10 TABLET, EXTENDED RELEASE ORAL DAILY
Qty: 30 TAB | Refills: 3 | Status: SHIPPED | OUTPATIENT
Start: 2017-01-01 | End: 2017-01-01 | Stop reason: SDUPTHER

## 2017-01-01 RX ORDER — MORPHINE SULFATE 10 MG/ML
8 INJECTION, SOLUTION INTRAMUSCULAR; INTRAVENOUS ONCE
Status: COMPLETED | OUTPATIENT
Start: 2017-01-01 | End: 2017-01-01

## 2017-01-01 RX ORDER — TORSEMIDE 20 MG/1
40 TABLET ORAL 2 TIMES DAILY
Qty: 120 TAB | Refills: 3 | Status: SHIPPED | OUTPATIENT
Start: 2017-01-01 | End: 2017-01-01

## 2017-01-01 RX ORDER — DOXYCYCLINE 100 MG/1
100 TABLET ORAL EVERY 12 HOURS
Status: DISCONTINUED | OUTPATIENT
Start: 2017-01-01 | End: 2017-01-01 | Stop reason: HOSPADM

## 2017-01-01 RX ORDER — POTASSIUM CHLORIDE 20 MEQ/1
20 TABLET, EXTENDED RELEASE ORAL ONCE
Status: COMPLETED | OUTPATIENT
Start: 2017-01-01 | End: 2017-01-01

## 2017-01-01 RX ORDER — FUROSEMIDE 40 MG/1
100 TABLET ORAL DAILY
Qty: 75 TAB | Refills: 11 | Status: SHIPPED | OUTPATIENT
Start: 2017-01-01 | End: 2017-01-01

## 2017-01-01 RX ORDER — MAGNESIUM SULFATE HEPTAHYDRATE 40 MG/ML
4 INJECTION, SOLUTION INTRAVENOUS ONCE
Status: COMPLETED | OUTPATIENT
Start: 2017-01-01 | End: 2017-01-01

## 2017-01-01 RX ORDER — HEPARIN SODIUM 5000 [USP'U]/ML
5000 INJECTION, SOLUTION INTRAVENOUS; SUBCUTANEOUS EVERY 8 HOURS
Status: DISCONTINUED | OUTPATIENT
Start: 2017-01-01 | End: 2017-01-01 | Stop reason: HOSPADM

## 2017-01-01 RX ORDER — PROMETHAZINE HYDROCHLORIDE 25 MG/1
12.5-25 TABLET ORAL EVERY 4 HOURS PRN
Status: DISCONTINUED | OUTPATIENT
Start: 2017-01-01 | End: 2017-01-01 | Stop reason: HOSPADM

## 2017-01-01 RX ORDER — AMOXICILLIN 250 MG
1 CAPSULE ORAL
Status: DISCONTINUED | OUTPATIENT
Start: 2017-01-01 | End: 2017-01-01 | Stop reason: HOSPADM

## 2017-01-01 RX ORDER — INSULIN GLARGINE 100 [IU]/ML
17 INJECTION, SOLUTION SUBCUTANEOUS EVERY EVENING
Qty: 10 ML | Refills: 1 | Status: SHIPPED | OUTPATIENT
Start: 2017-01-01 | End: 2017-01-01

## 2017-01-01 RX ORDER — INSULIN GLARGINE 100 [IU]/ML
17 INJECTION, SOLUTION SUBCUTANEOUS EVERY EVENING
Qty: 10 ML | Refills: 2
Start: 2017-01-01

## 2017-01-01 RX ORDER — LISINOPRIL 20 MG/1
20 TABLET ORAL DAILY
Qty: 30 TAB | Refills: 11 | Status: SHIPPED | OUTPATIENT
Start: 2017-01-01 | End: 2017-01-01 | Stop reason: SDUPTHER

## 2017-01-01 RX ORDER — ROSUVASTATIN CALCIUM 20 MG/1
20 TABLET, COATED ORAL EVERY EVENING
Status: DISCONTINUED | OUTPATIENT
Start: 2017-01-01 | End: 2017-01-01

## 2017-01-01 RX ORDER — FUROSEMIDE 10 MG/ML
40 INJECTION INTRAMUSCULAR; INTRAVENOUS
Status: DISCONTINUED | OUTPATIENT
Start: 2017-01-01 | End: 2017-01-01

## 2017-01-01 RX ORDER — ASPIRIN 81 MG/1
324 TABLET, CHEWABLE ORAL ONCE
Status: COMPLETED | OUTPATIENT
Start: 2017-01-01 | End: 2017-01-01

## 2017-01-01 RX ORDER — SYRINGE-NEEDLE,INSULIN,0.5 ML 28GX1/2"
1 SYRINGE, EMPTY DISPOSABLE MISCELLANEOUS
Qty: 120 EACH | Refills: 2 | Status: SHIPPED | OUTPATIENT
Start: 2017-01-01 | End: 2017-01-01

## 2017-01-01 RX ORDER — SPIRONOLACTONE 25 MG/1
25 TABLET ORAL DAILY
Qty: 30 TAB | Refills: 11 | Status: SHIPPED | OUTPATIENT
Start: 2017-01-01

## 2017-01-01 RX ORDER — CLOPIDOGREL BISULFATE 75 MG/1
75 TABLET ORAL DAILY
Status: DISCONTINUED | OUTPATIENT
Start: 2017-01-01 | End: 2017-01-01

## 2017-01-01 RX ORDER — CARVEDILOL 6.25 MG/1
6.25 TABLET ORAL 2 TIMES DAILY WITH MEALS
Status: DISCONTINUED | OUTPATIENT
Start: 2017-01-01 | End: 2017-01-01 | Stop reason: HOSPADM

## 2017-01-01 RX ORDER — FUROSEMIDE 40 MG/1
40 TABLET ORAL DAILY
Qty: 30 TAB | Refills: 0 | Status: SHIPPED | OUTPATIENT
Start: 2017-01-01 | End: 2017-01-01 | Stop reason: SDUPTHER

## 2017-01-01 RX ORDER — IBUPROFEN 600 MG/1
600 TABLET ORAL EVERY 6 HOURS PRN
Status: DISCONTINUED | OUTPATIENT
Start: 2017-01-01 | End: 2017-01-01 | Stop reason: HOSPADM

## 2017-01-01 RX ORDER — FUROSEMIDE 40 MG/1
40 TABLET ORAL
Status: DISCONTINUED | OUTPATIENT
Start: 2017-01-01 | End: 2017-01-01 | Stop reason: HOSPADM

## 2017-01-01 RX ORDER — OMEPRAZOLE 20 MG/1
20 CAPSULE, DELAYED RELEASE ORAL DAILY
Qty: 30 CAP | Refills: 11 | Status: SHIPPED | OUTPATIENT
Start: 2017-01-01

## 2017-01-01 RX ADMIN — POTASSIUM CHLORIDE AND SODIUM CHLORIDE: 900; 150 INJECTION, SOLUTION INTRAVENOUS at 15:57

## 2017-01-01 RX ADMIN — ACETAMINOPHEN 650 MG: 325 TABLET, FILM COATED ORAL at 03:02

## 2017-01-01 RX ADMIN — CARVEDILOL 6.25 MG: 6.25 TABLET, FILM COATED ORAL at 16:36

## 2017-01-01 RX ADMIN — DOXYCYCLINE 100 MG: 100 TABLET ORAL at 22:07

## 2017-01-01 RX ADMIN — HEPARIN SODIUM 5000 UNITS: 5000 INJECTION, SOLUTION INTRAVENOUS; SUBCUTANEOUS at 05:27

## 2017-01-01 RX ADMIN — SODIUM BICARBONATE 50 MEQ: 84 INJECTION, SOLUTION INTRAVENOUS at 20:18

## 2017-01-01 RX ADMIN — POTASSIUM CHLORIDE 10 MEQ: 1500 TABLET, EXTENDED RELEASE ORAL at 08:52

## 2017-01-01 RX ADMIN — HEPARIN SODIUM 5000 UNITS: 5000 INJECTION, SOLUTION INTRAVENOUS; SUBCUTANEOUS at 15:40

## 2017-01-01 RX ADMIN — ASPIRIN 81 MG: 81 TABLET ORAL at 08:12

## 2017-01-01 RX ADMIN — INSULIN LISPRO 9 UNITS: 100 INJECTION, SOLUTION INTRAVENOUS; SUBCUTANEOUS at 23:11

## 2017-01-01 RX ADMIN — CARVEDILOL 12.5 MG: 12.5 TABLET, FILM COATED ORAL at 09:04

## 2017-01-01 RX ADMIN — ASPIRIN 81 MG: 81 TABLET ORAL at 08:05

## 2017-01-01 RX ADMIN — AMOXICILLIN AND CLAVULANATE POTASSIUM 1 TABLET: 875; 125 TABLET, FILM COATED ORAL at 11:13

## 2017-01-01 RX ADMIN — GUAIFENESIN AND DEXTROMETHORPHAN 5 ML: 100; 10 SYRUP ORAL at 08:25

## 2017-01-01 RX ADMIN — NITROGLYCERIN 1 INCH: 20 OINTMENT TOPICAL at 23:13

## 2017-01-01 RX ADMIN — CARVEDILOL 6.25 MG: 6.25 TABLET, FILM COATED ORAL at 16:38

## 2017-01-01 RX ADMIN — CARVEDILOL 12.5 MG: 12.5 TABLET, FILM COATED ORAL at 16:50

## 2017-01-01 RX ADMIN — HEPARIN SODIUM 5000 UNITS: 5000 INJECTION, SOLUTION INTRAVENOUS; SUBCUTANEOUS at 06:16

## 2017-01-01 RX ADMIN — CARVEDILOL 12.5 MG: 12.5 TABLET, FILM COATED ORAL at 09:25

## 2017-01-01 RX ADMIN — SPIRONOLACTONE 25 MG: 25 TABLET, FILM COATED ORAL at 07:57

## 2017-01-01 RX ADMIN — AMOXICILLIN AND CLAVULANATE POTASSIUM 1 TABLET: 875; 125 TABLET, FILM COATED ORAL at 09:00

## 2017-01-01 RX ADMIN — ENOXAPARIN SODIUM 40 MG: 100 INJECTION SUBCUTANEOUS at 08:21

## 2017-01-01 RX ADMIN — POTASSIUM CHLORIDE 10 MEQ: 1500 TABLET, EXTENDED RELEASE ORAL at 09:15

## 2017-01-01 RX ADMIN — CARVEDILOL 12.5 MG: 12.5 TABLET, FILM COATED ORAL at 17:27

## 2017-01-01 RX ADMIN — CARVEDILOL 12.5 MG: 12.5 TABLET, FILM COATED ORAL at 17:13

## 2017-01-01 RX ADMIN — INSULIN LISPRO 3 UNITS: 100 INJECTION, SOLUTION INTRAVENOUS; SUBCUTANEOUS at 16:52

## 2017-01-01 RX ADMIN — FUROSEMIDE 40 MG: 40 TABLET ORAL at 17:13

## 2017-01-01 RX ADMIN — CARVEDILOL 12.5 MG: 12.5 TABLET, FILM COATED ORAL at 08:13

## 2017-01-01 RX ADMIN — INSULIN LISPRO 4 UNITS: 100 INJECTION, SOLUTION INTRAVENOUS; SUBCUTANEOUS at 20:20

## 2017-01-01 RX ADMIN — ASPIRIN 81 MG: 81 TABLET ORAL at 09:15

## 2017-01-01 RX ADMIN — INSULIN GLARGINE 15 UNITS: 100 INJECTION, SOLUTION SUBCUTANEOUS at 20:22

## 2017-01-01 RX ADMIN — CHOLECALCIFEROL TAB 25 MCG (1000 UNIT) 1000 UNITS: 25 TAB at 08:14

## 2017-01-01 RX ADMIN — FUROSEMIDE 40 MG: 10 INJECTION, SOLUTION INTRAVENOUS at 05:44

## 2017-01-01 RX ADMIN — ATORVASTATIN CALCIUM 40 MG: 40 TABLET, FILM COATED ORAL at 22:05

## 2017-01-01 RX ADMIN — CHOLECALCIFEROL TAB 25 MCG (1000 UNIT) 1000 UNITS: 25 TAB at 09:03

## 2017-01-01 RX ADMIN — FUROSEMIDE 40 MG: 40 TABLET ORAL at 17:20

## 2017-01-01 RX ADMIN — INSULIN GLARGINE 15 UNITS: 100 INJECTION, SOLUTION SUBCUTANEOUS at 20:59

## 2017-01-01 RX ADMIN — CEFTRIAXONE 2 G: 2 INJECTION, POWDER, FOR SOLUTION INTRAMUSCULAR; INTRAVENOUS at 13:53

## 2017-01-01 RX ADMIN — INSULIN HUMAN 3 UNITS: 100 INJECTION, SOLUTION PARENTERAL at 16:17

## 2017-01-01 RX ADMIN — SPIRONOLACTONE 25 MG: 25 TABLET, FILM COATED ORAL at 09:15

## 2017-01-01 RX ADMIN — MAGNESIUM GLUCONATE 500 MG ORAL TABLET 400 MG: 500 TABLET ORAL at 20:09

## 2017-01-01 RX ADMIN — PREDNISONE 40 MG: 20 TABLET ORAL at 09:27

## 2017-01-01 RX ADMIN — CARVEDILOL 12.5 MG: 12.5 TABLET, FILM COATED ORAL at 16:51

## 2017-01-01 RX ADMIN — METHYLPREDNISOLONE SODIUM SUCCINATE 40 MG: 40 INJECTION, POWDER, FOR SOLUTION INTRAMUSCULAR; INTRAVENOUS at 06:27

## 2017-01-01 RX ADMIN — FUROSEMIDE 40 MG: 10 INJECTION, SOLUTION INTRAVENOUS at 09:21

## 2017-01-01 RX ADMIN — INSULIN GLARGINE 15 UNITS: 100 INJECTION, SOLUTION SUBCUTANEOUS at 20:51

## 2017-01-01 RX ADMIN — INSULIN GLARGINE 15 UNITS: 100 INJECTION, SOLUTION SUBCUTANEOUS at 22:05

## 2017-01-01 RX ADMIN — ENOXAPARIN SODIUM 40 MG: 100 INJECTION SUBCUTANEOUS at 08:52

## 2017-01-01 RX ADMIN — EPINEPHRINE 1 MG: 0.1 INJECTION INTRACARDIAC; INTRAVENOUS at 20:15

## 2017-01-01 RX ADMIN — IBUPROFEN 600 MG: 600 TABLET, FILM COATED ORAL at 03:29

## 2017-01-01 RX ADMIN — ACETAMINOPHEN 650 MG: 325 TABLET, FILM COATED ORAL at 00:49

## 2017-01-01 RX ADMIN — INSULIN LISPRO 10 UNITS: 100 INJECTION, SOLUTION INTRAVENOUS; SUBCUTANEOUS at 21:03

## 2017-01-01 RX ADMIN — CARVEDILOL 12.5 MG: 12.5 TABLET, FILM COATED ORAL at 22:26

## 2017-01-01 RX ADMIN — INSULIN LISPRO 14 UNITS: 100 INJECTION, SOLUTION INTRAVENOUS; SUBCUTANEOUS at 06:24

## 2017-01-01 RX ADMIN — INSULIN LISPRO 10 UNITS: 100 INJECTION, SOLUTION INTRAVENOUS; SUBCUTANEOUS at 06:02

## 2017-01-01 RX ADMIN — INSULIN GLARGINE 15 UNITS: 100 INJECTION, SOLUTION SUBCUTANEOUS at 21:03

## 2017-01-01 RX ADMIN — CARVEDILOL 12.5 MG: 12.5 TABLET, FILM COATED ORAL at 16:45

## 2017-01-01 RX ADMIN — ASPIRIN 81 MG: 81 TABLET ORAL at 08:23

## 2017-01-01 RX ADMIN — LISINOPRIL 5 MG: 5 TABLET ORAL at 08:23

## 2017-01-01 RX ADMIN — FUROSEMIDE 4 MG/HR: 10 INJECTION, SOLUTION INTRAMUSCULAR; INTRAVENOUS at 11:15

## 2017-01-01 RX ADMIN — FUROSEMIDE 40 MG: 40 TABLET ORAL at 16:36

## 2017-01-01 RX ADMIN — MAGNESIUM GLUCONATE 500 MG ORAL TABLET 400 MG: 500 TABLET ORAL at 22:07

## 2017-01-01 RX ADMIN — CARVEDILOL 12.5 MG: 12.5 TABLET, FILM COATED ORAL at 16:20

## 2017-01-01 RX ADMIN — DOXYCYCLINE 100 MG: 100 TABLET ORAL at 08:14

## 2017-01-01 RX ADMIN — INSULIN LISPRO 7 UNITS: 100 INJECTION, SOLUTION INTRAVENOUS; SUBCUTANEOUS at 17:17

## 2017-01-01 RX ADMIN — SPIRONOLACTONE 25 MG: 25 TABLET, FILM COATED ORAL at 09:11

## 2017-01-01 RX ADMIN — POTASSIUM CHLORIDE 10 MEQ: 1500 TABLET, EXTENDED RELEASE ORAL at 08:14

## 2017-01-01 RX ADMIN — METHYLPREDNISOLONE SODIUM SUCCINATE 40 MG: 40 INJECTION, POWDER, FOR SOLUTION INTRAMUSCULAR; INTRAVENOUS at 17:23

## 2017-01-01 RX ADMIN — MAGNESIUM GLUCONATE 500 MG ORAL TABLET 400 MG: 500 TABLET ORAL at 08:14

## 2017-01-01 RX ADMIN — POTASSIUM CHLORIDE 10 MEQ: 1500 TABLET, EXTENDED RELEASE ORAL at 08:40

## 2017-01-01 RX ADMIN — ONDANSETRON 4 MG: 2 INJECTION, SOLUTION INTRAMUSCULAR; INTRAVENOUS at 05:48

## 2017-01-01 RX ADMIN — OMEPRAZOLE 20 MG: 20 CAPSULE, DELAYED RELEASE ORAL at 08:52

## 2017-01-01 RX ADMIN — LISINOPRIL 20 MG: 20 TABLET ORAL at 09:03

## 2017-01-01 RX ADMIN — CARVEDILOL 6.25 MG: 6.25 TABLET, FILM COATED ORAL at 08:12

## 2017-01-01 RX ADMIN — SPIRONOLACTONE 25 MG: 25 TABLET, FILM COATED ORAL at 08:13

## 2017-01-01 RX ADMIN — OMEPRAZOLE 20 MG: 20 CAPSULE, DELAYED RELEASE ORAL at 08:12

## 2017-01-01 RX ADMIN — CHOLECALCIFEROL TAB 25 MCG (1000 UNIT) 1000 UNITS: 25 TAB at 08:39

## 2017-01-01 RX ADMIN — POTASSIUM CHLORIDE AND SODIUM CHLORIDE: 900; 150 INJECTION, SOLUTION INTRAVENOUS at 04:02

## 2017-01-01 RX ADMIN — INSULIN HUMAN 3 UNITS: 100 INJECTION, SOLUTION PARENTERAL at 17:02

## 2017-01-01 RX ADMIN — ASPIRIN 81 MG: 81 TABLET ORAL at 08:02

## 2017-01-01 RX ADMIN — INSULIN LISPRO 4 UNITS: 100 INJECTION, SOLUTION INTRAVENOUS; SUBCUTANEOUS at 06:20

## 2017-01-01 RX ADMIN — FUROSEMIDE 40 MG: 10 INJECTION, SOLUTION INTRAVENOUS at 18:41

## 2017-01-01 RX ADMIN — DOXYCYCLINE 100 MG: 100 TABLET ORAL at 20:08

## 2017-01-01 RX ADMIN — INSULIN HUMAN 12 UNITS: 100 INJECTION, SOLUTION PARENTERAL at 16:39

## 2017-01-01 RX ADMIN — LISINOPRIL 20 MG: 20 TABLET ORAL at 08:47

## 2017-01-01 RX ADMIN — ENOXAPARIN SODIUM 40 MG: 100 INJECTION SUBCUTANEOUS at 08:03

## 2017-01-01 RX ADMIN — SPIRONOLACTONE 25 MG: 25 TABLET, FILM COATED ORAL at 08:03

## 2017-01-01 RX ADMIN — INSULIN LISPRO 5 UNITS: 100 INJECTION, SOLUTION INTRAVENOUS; SUBCUTANEOUS at 13:35

## 2017-01-01 RX ADMIN — MAGNESIUM SULFATE IN DEXTROSE 1 G: 10 INJECTION, SOLUTION INTRAVENOUS at 07:30

## 2017-01-01 RX ADMIN — CARVEDILOL 12.5 MG: 12.5 TABLET, FILM COATED ORAL at 17:26

## 2017-01-01 RX ADMIN — POTASSIUM CHLORIDE AND SODIUM CHLORIDE: 900; 150 INJECTION, SOLUTION INTRAVENOUS at 11:09

## 2017-01-01 RX ADMIN — Medication 325 MG: at 09:11

## 2017-01-01 RX ADMIN — ATORVASTATIN CALCIUM 40 MG: 40 TABLET, FILM COATED ORAL at 21:02

## 2017-01-01 RX ADMIN — ASPIRIN 81 MG: 81 TABLET ORAL at 09:04

## 2017-01-01 RX ADMIN — CARVEDILOL 12.5 MG: 12.5 TABLET, FILM COATED ORAL at 08:52

## 2017-01-01 RX ADMIN — ALBUTEROL SULFATE 2.5 MG: 2.5 SOLUTION RESPIRATORY (INHALATION) at 11:17

## 2017-01-01 RX ADMIN — MORPHINE SULFATE 8 MG: 10 INJECTION INTRAVENOUS at 21:16

## 2017-01-01 RX ADMIN — INSULIN HUMAN 3 UNITS: 100 INJECTION, SOLUTION PARENTERAL at 05:20

## 2017-01-01 RX ADMIN — CARVEDILOL 6.25 MG: 6.25 TABLET, FILM COATED ORAL at 08:20

## 2017-01-01 RX ADMIN — MAGNESIUM SULFATE IN DEXTROSE 1 G: 10 INJECTION, SOLUTION INTRAVENOUS at 23:04

## 2017-01-01 RX ADMIN — FUROSEMIDE 40 MG: 40 TABLET ORAL at 16:38

## 2017-01-01 RX ADMIN — ATORVASTATIN CALCIUM 40 MG: 40 TABLET, FILM COATED ORAL at 20:45

## 2017-01-01 RX ADMIN — POTASSIUM CHLORIDE AND SODIUM CHLORIDE: 900; 150 INJECTION, SOLUTION INTRAVENOUS at 22:25

## 2017-01-01 RX ADMIN — INSULIN GLARGINE 10 UNITS: 100 INJECTION, SOLUTION SUBCUTANEOUS at 01:11

## 2017-01-01 RX ADMIN — EPINEPHRINE 10 MCG/MIN: 1 INJECTION PARENTERAL at 20:28

## 2017-01-01 RX ADMIN — LISINOPRIL 20 MG: 20 TABLET ORAL at 08:39

## 2017-01-01 RX ADMIN — INSULIN HUMAN 3 UNITS: 100 INJECTION, SOLUTION PARENTERAL at 11:37

## 2017-01-01 RX ADMIN — FUROSEMIDE 40 MG: 40 TABLET ORAL at 05:40

## 2017-01-01 RX ADMIN — LORAZEPAM 2 MG: 2 INJECTION INTRAMUSCULAR at 21:17

## 2017-01-01 RX ADMIN — ACETAMINOPHEN 650 MG: 325 TABLET, FILM COATED ORAL at 04:41

## 2017-01-01 RX ADMIN — LISINOPRIL 20 MG: 20 TABLET ORAL at 08:13

## 2017-01-01 RX ADMIN — INSULIN HUMAN 18 UNITS: 100 INJECTION, SOLUTION PARENTERAL at 05:19

## 2017-01-01 RX ADMIN — DOXYCYCLINE 100 MG: 100 TABLET ORAL at 12:24

## 2017-01-01 RX ADMIN — FUROSEMIDE 40 MG: 10 INJECTION, SOLUTION INTRAVENOUS at 06:14

## 2017-01-01 RX ADMIN — INSULIN GLARGINE 17 UNITS: 100 INJECTION, SOLUTION SUBCUTANEOUS at 22:12

## 2017-01-01 RX ADMIN — ATORVASTATIN CALCIUM 40 MG: 40 TABLET, FILM COATED ORAL at 19:59

## 2017-01-01 RX ADMIN — CARVEDILOL 12.5 MG: 12.5 TABLET, FILM COATED ORAL at 09:15

## 2017-01-01 RX ADMIN — STANDARDIZED SENNA CONCENTRATE AND DOCUSATE SODIUM 2 TABLET: 8.6; 5 TABLET, FILM COATED ORAL at 09:04

## 2017-01-01 RX ADMIN — POTASSIUM CHLORIDE 40 MEQ: 1500 TABLET, EXTENDED RELEASE ORAL at 19:59

## 2017-01-01 RX ADMIN — OMEPRAZOLE 20 MG: 20 CAPSULE, DELAYED RELEASE ORAL at 08:05

## 2017-01-01 RX ADMIN — OMEPRAZOLE 20 MG: 20 CAPSULE, DELAYED RELEASE ORAL at 08:13

## 2017-01-01 RX ADMIN — IOHEXOL 100 ML: 350 INJECTION, SOLUTION INTRAVENOUS at 18:36

## 2017-01-01 RX ADMIN — ASPIRIN 81 MG: 81 TABLET ORAL at 09:11

## 2017-01-01 RX ADMIN — MAGNESIUM GLUCONATE 500 MG ORAL TABLET 400 MG: 500 TABLET ORAL at 09:26

## 2017-01-01 RX ADMIN — INSULIN LISPRO 7 UNITS: 100 INJECTION, SOLUTION INTRAVENOUS; SUBCUTANEOUS at 06:15

## 2017-01-01 RX ADMIN — METHYLPREDNISOLONE SODIUM SUCCINATE 40 MG: 40 INJECTION, POWDER, FOR SOLUTION INTRAMUSCULAR; INTRAVENOUS at 14:57

## 2017-01-01 RX ADMIN — INSULIN HUMAN 6 UNITS: 100 INJECTION, SOLUTION PARENTERAL at 16:45

## 2017-01-01 RX ADMIN — DOCUSATE SODIUM 100 MG: 100 CAPSULE ORAL at 08:12

## 2017-01-01 RX ADMIN — CHOLECALCIFEROL TAB 25 MCG (1000 UNIT) 1000 UNITS: 25 TAB at 08:44

## 2017-01-01 RX ADMIN — ALBUTEROL SULFATE 2.5 MG: 2.5 SOLUTION RESPIRATORY (INHALATION) at 20:18

## 2017-01-01 RX ADMIN — POTASSIUM CHLORIDE 40 MEQ: 1500 TABLET, EXTENDED RELEASE ORAL at 11:11

## 2017-01-01 RX ADMIN — ASPIRIN 81 MG: 81 TABLET ORAL at 08:13

## 2017-01-01 RX ADMIN — ACETAMINOPHEN 650 MG: 325 TABLET, FILM COATED ORAL at 02:19

## 2017-01-01 RX ADMIN — METHYLPREDNISOLONE SODIUM SUCCINATE 40 MG: 40 INJECTION, POWDER, FOR SOLUTION INTRAMUSCULAR; INTRAVENOUS at 20:38

## 2017-01-01 RX ADMIN — SPIRONOLACTONE 25 MG: 25 TABLET, FILM COATED ORAL at 08:53

## 2017-01-01 RX ADMIN — HEPARIN SODIUM 5000 UNITS: 5000 INJECTION, SOLUTION INTRAVENOUS; SUBCUTANEOUS at 20:09

## 2017-01-01 RX ADMIN — AMPICILLIN SODIUM AND SULBACTAM SODIUM 3 G: 2; 1 INJECTION, POWDER, FOR SOLUTION INTRAMUSCULAR; INTRAVENOUS at 19:45

## 2017-01-01 RX ADMIN — STANDARDIZED SENNA CONCENTRATE AND DOCUSATE SODIUM 2 TABLET: 8.6; 5 TABLET, FILM COATED ORAL at 09:00

## 2017-01-01 RX ADMIN — DOXYCYCLINE 100 MG: 100 TABLET ORAL at 20:50

## 2017-01-01 RX ADMIN — CEFTRIAXONE 2 G: 2 INJECTION, POWDER, FOR SOLUTION INTRAMUSCULAR; INTRAVENOUS at 09:36

## 2017-01-01 RX ADMIN — SPIRONOLACTONE 25 MG: 25 TABLET, FILM COATED ORAL at 08:12

## 2017-01-01 RX ADMIN — POTASSIUM CHLORIDE 40 MEQ: 1500 TABLET, EXTENDED RELEASE ORAL at 07:57

## 2017-01-01 RX ADMIN — POTASSIUM CHLORIDE 20 MEQ: 1500 TABLET, EXTENDED RELEASE ORAL at 07:30

## 2017-01-01 RX ADMIN — ASPIRIN 81 MG: 81 TABLET ORAL at 08:44

## 2017-01-01 RX ADMIN — LISINOPRIL 20 MG: 20 TABLET ORAL at 08:02

## 2017-01-01 RX ADMIN — CARVEDILOL 12.5 MG: 12.5 TABLET, FILM COATED ORAL at 08:14

## 2017-01-01 RX ADMIN — ATORVASTATIN CALCIUM 40 MG: 40 TABLET, FILM COATED ORAL at 20:38

## 2017-01-01 RX ADMIN — INSULIN LISPRO 5 UNITS: 100 INJECTION, SOLUTION INTRAVENOUS; SUBCUTANEOUS at 17:37

## 2017-01-01 RX ADMIN — INSULIN GLARGINE 20 UNITS: 100 INJECTION, SOLUTION SUBCUTANEOUS at 20:47

## 2017-01-01 RX ADMIN — INSULIN LISPRO 3 UNITS: 100 INJECTION, SOLUTION INTRAVENOUS; SUBCUTANEOUS at 05:48

## 2017-01-01 RX ADMIN — PREDNISONE 40 MG: 20 TABLET ORAL at 09:00

## 2017-01-01 RX ADMIN — EPINEPHRINE 1 MG: 0.1 INJECTION INTRACARDIAC; INTRAVENOUS at 19:59

## 2017-01-01 RX ADMIN — INSULIN HUMAN 3 UNITS: 100 INJECTION, SOLUTION PARENTERAL at 12:27

## 2017-01-01 RX ADMIN — INSULIN HUMAN 6 UNITS: 100 INJECTION, SOLUTION PARENTERAL at 10:28

## 2017-01-01 RX ADMIN — NOREPINEPHRINE BITARTRATE 10 MCG/MIN: 1 INJECTION INTRAVENOUS at 21:00

## 2017-01-01 RX ADMIN — CHOLECALCIFEROL TAB 25 MCG (1000 UNIT) 1000 UNITS: 25 TAB at 09:25

## 2017-01-01 RX ADMIN — FUROSEMIDE 40 MG: 10 INJECTION, SOLUTION INTRAVENOUS at 06:20

## 2017-01-01 RX ADMIN — INSULIN HUMAN 3 UNITS: 100 INJECTION, SOLUTION PARENTERAL at 20:59

## 2017-01-01 RX ADMIN — INSULIN LISPRO 3 UNITS: 100 INJECTION, SOLUTION INTRAVENOUS; SUBCUTANEOUS at 05:37

## 2017-01-01 RX ADMIN — INSULIN LISPRO 3 UNITS: 100 INJECTION, SOLUTION INTRAVENOUS; SUBCUTANEOUS at 18:14

## 2017-01-01 RX ADMIN — MAGNESIUM GLUCONATE 500 MG ORAL TABLET 400 MG: 500 TABLET ORAL at 09:04

## 2017-01-01 RX ADMIN — CLOPIDOGREL 75 MG: 75 TABLET, FILM COATED ORAL at 22:43

## 2017-01-01 RX ADMIN — FUROSEMIDE 40 MG: 10 INJECTION, SOLUTION INTRAVENOUS at 05:46

## 2017-01-01 RX ADMIN — SPIRONOLACTONE 25 MG: 25 TABLET, FILM COATED ORAL at 10:35

## 2017-01-01 RX ADMIN — ASPIRIN 81 MG: 81 TABLET ORAL at 07:57

## 2017-01-01 RX ADMIN — ATORVASTATIN CALCIUM 40 MG: 40 TABLET, FILM COATED ORAL at 20:08

## 2017-01-01 RX ADMIN — OMEPRAZOLE 20 MG: 20 CAPSULE, DELAYED RELEASE ORAL at 07:58

## 2017-01-01 RX ADMIN — INSULIN GLARGINE 17 UNITS: 100 INJECTION, SOLUTION SUBCUTANEOUS at 20:00

## 2017-01-01 RX ADMIN — POTASSIUM CHLORIDE AND SODIUM CHLORIDE: 900; 150 INJECTION, SOLUTION INTRAVENOUS at 04:44

## 2017-01-01 RX ADMIN — INSULIN HUMAN 3 UNITS: 100 INJECTION, SOLUTION PARENTERAL at 05:12

## 2017-01-01 RX ADMIN — HEPARIN SODIUM 5000 UNITS: 5000 INJECTION, SOLUTION INTRAVENOUS; SUBCUTANEOUS at 14:56

## 2017-01-01 RX ADMIN — GUAIFENESIN AND DEXTROMETHORPHAN 5 ML: 100; 10 SYRUP ORAL at 18:13

## 2017-01-01 RX ADMIN — ASPIRIN 81 MG: 81 TABLET ORAL at 08:20

## 2017-01-01 RX ADMIN — CARVEDILOL 6.25 MG: 6.25 TABLET, FILM COATED ORAL at 08:05

## 2017-01-01 RX ADMIN — INSULIN LISPRO 3 UNITS: 100 INJECTION, SOLUTION INTRAVENOUS; SUBCUTANEOUS at 13:47

## 2017-01-01 RX ADMIN — GUAIFENESIN AND DEXTROMETHORPHAN 5 ML: 100; 10 SYRUP ORAL at 17:44

## 2017-01-01 RX ADMIN — Medication 325 MG: at 08:18

## 2017-01-01 RX ADMIN — OMEPRAZOLE 20 MG: 20 CAPSULE, DELAYED RELEASE ORAL at 09:11

## 2017-01-01 RX ADMIN — FUROSEMIDE 40 MG: 40 TABLET ORAL at 12:04

## 2017-01-01 RX ADMIN — CARVEDILOL 12.5 MG: 12.5 TABLET, FILM COATED ORAL at 22:43

## 2017-01-01 RX ADMIN — DOXYCYCLINE 100 MG: 100 TABLET ORAL at 09:04

## 2017-01-01 RX ADMIN — LISINOPRIL 10 MG: 10 TABLET ORAL at 08:05

## 2017-01-01 RX ADMIN — MAGNESIUM GLUCONATE 500 MG ORAL TABLET 400 MG: 500 TABLET ORAL at 09:00

## 2017-01-01 RX ADMIN — CEFTRIAXONE 2 G: 2 INJECTION, POWDER, FOR SOLUTION INTRAMUSCULAR; INTRAVENOUS at 08:13

## 2017-01-01 RX ADMIN — ATORVASTATIN CALCIUM 40 MG: 40 TABLET, FILM COATED ORAL at 20:34

## 2017-01-01 RX ADMIN — INSULIN GLARGINE 15 UNITS: 100 INJECTION, SOLUTION SUBCUTANEOUS at 20:42

## 2017-01-01 RX ADMIN — ATORVASTATIN CALCIUM 40 MG: 40 TABLET, FILM COATED ORAL at 21:59

## 2017-01-01 RX ADMIN — ACETAMINOPHEN 650 MG: 325 TABLET, FILM COATED ORAL at 08:16

## 2017-01-01 RX ADMIN — DOXYCYCLINE 100 MG: 100 TABLET ORAL at 09:00

## 2017-01-01 RX ADMIN — POTASSIUM CHLORIDE 40 MEQ: 1500 TABLET, EXTENDED RELEASE ORAL at 16:50

## 2017-01-01 RX ADMIN — INSULIN LISPRO 8 UNITS: 100 INJECTION, SOLUTION INTRAVENOUS; SUBCUTANEOUS at 05:56

## 2017-01-01 RX ADMIN — ASPIRIN 81 MG: 81 TABLET ORAL at 09:26

## 2017-01-01 RX ADMIN — INSULIN LISPRO 4 UNITS: 100 INJECTION, SOLUTION INTRAVENOUS; SUBCUTANEOUS at 11:49

## 2017-01-01 RX ADMIN — POTASSIUM CHLORIDE AND SODIUM CHLORIDE: 900; 150 INJECTION, SOLUTION INTRAVENOUS at 03:40

## 2017-01-01 RX ADMIN — HEPARIN SODIUM 5000 UNITS: 5000 INJECTION, SOLUTION INTRAVENOUS; SUBCUTANEOUS at 20:50

## 2017-01-01 RX ADMIN — ALBUTEROL SULFATE 2.5 MG: 2.5 SOLUTION RESPIRATORY (INHALATION) at 07:52

## 2017-01-01 RX ADMIN — SPIRONOLACTONE 25 MG: 25 TABLET, FILM COATED ORAL at 10:28

## 2017-01-01 RX ADMIN — INSULIN LISPRO 10 UNITS: 100 INJECTION, SOLUTION INTRAVENOUS; SUBCUTANEOUS at 17:33

## 2017-01-01 RX ADMIN — SPIRONOLACTONE 25 MG: 25 TABLET, FILM COATED ORAL at 08:47

## 2017-01-01 RX ADMIN — AZITHROMYCIN 500 MG: 250 TABLET, FILM COATED ORAL at 18:40

## 2017-01-01 RX ADMIN — POTASSIUM CHLORIDE AND SODIUM CHLORIDE: 900; 150 INJECTION, SOLUTION INTRAVENOUS at 21:35

## 2017-01-01 RX ADMIN — SPIRONOLACTONE 25 MG: 25 TABLET, FILM COATED ORAL at 08:39

## 2017-01-01 RX ADMIN — INSULIN LISPRO 7 UNITS: 100 INJECTION, SOLUTION INTRAVENOUS; SUBCUTANEOUS at 12:07

## 2017-01-01 RX ADMIN — ASPIRIN 325 MG: 325 TABLET, COATED ORAL at 18:40

## 2017-01-01 RX ADMIN — INSULIN HUMAN 3 UNITS: 100 INJECTION, SOLUTION PARENTERAL at 22:07

## 2017-01-01 RX ADMIN — ENOXAPARIN SODIUM 40 MG: 100 INJECTION SUBCUTANEOUS at 08:40

## 2017-01-01 RX ADMIN — CARVEDILOL 6.25 MG: 6.25 TABLET, FILM COATED ORAL at 18:13

## 2017-01-01 RX ADMIN — GUAIFENESIN AND DEXTROMETHORPHAN 5 ML: 100; 10 SYRUP ORAL at 17:38

## 2017-01-01 RX ADMIN — FUROSEMIDE 40 MG: 10 INJECTION, SOLUTION INTRAVENOUS at 17:20

## 2017-01-01 RX ADMIN — ACETAMINOPHEN 650 MG: 325 TABLET, FILM COATED ORAL at 09:11

## 2017-01-01 RX ADMIN — LISINOPRIL 20 MG: 20 TABLET ORAL at 08:44

## 2017-01-01 RX ADMIN — INSULIN LISPRO 4 UNITS: 100 INJECTION, SOLUTION INTRAVENOUS; SUBCUTANEOUS at 17:32

## 2017-01-01 RX ADMIN — ENOXAPARIN SODIUM 40 MG: 100 INJECTION SUBCUTANEOUS at 08:05

## 2017-01-01 RX ADMIN — LISINOPRIL 20 MG: 20 TABLET ORAL at 08:14

## 2017-01-01 RX ADMIN — INSULIN LISPRO 3 UNITS: 100 INJECTION, SOLUTION INTRAVENOUS; SUBCUTANEOUS at 17:47

## 2017-01-01 RX ADMIN — INSULIN GLARGINE 15 UNITS: 100 INJECTION, SOLUTION SUBCUTANEOUS at 20:40

## 2017-01-01 RX ADMIN — GADODIAMIDE 20 ML: 287 INJECTION INTRAVENOUS at 18:41

## 2017-01-01 RX ADMIN — CARVEDILOL 12.5 MG: 12.5 TABLET, FILM COATED ORAL at 08:21

## 2017-01-01 RX ADMIN — ACETAMINOPHEN 650 MG: 325 TABLET, FILM COATED ORAL at 17:38

## 2017-01-01 RX ADMIN — LISINOPRIL 20 MG: 20 TABLET ORAL at 08:52

## 2017-01-01 RX ADMIN — FUROSEMIDE 40 MG: 10 INJECTION, SOLUTION INTRAVENOUS at 02:23

## 2017-01-01 RX ADMIN — ATORVASTATIN CALCIUM 40 MG: 40 TABLET, FILM COATED ORAL at 21:00

## 2017-01-01 RX ADMIN — LISINOPRIL 20 MG: 20 TABLET ORAL at 08:12

## 2017-01-01 RX ADMIN — SPIRONOLACTONE 25 MG: 25 TABLET, FILM COATED ORAL at 09:26

## 2017-01-01 RX ADMIN — ENOXAPARIN SODIUM 40 MG: 100 INJECTION SUBCUTANEOUS at 08:48

## 2017-01-01 RX ADMIN — FUROSEMIDE 40 MG: 40 TABLET ORAL at 05:37

## 2017-01-01 RX ADMIN — ENOXAPARIN SODIUM 40 MG: 100 INJECTION SUBCUTANEOUS at 07:57

## 2017-01-01 RX ADMIN — CEFTRIAXONE 2 G: 2 INJECTION, POWDER, FOR SOLUTION INTRAMUSCULAR; INTRAVENOUS at 09:12

## 2017-01-01 RX ADMIN — METHYLPREDNISOLONE SODIUM SUCCINATE 40 MG: 40 INJECTION, POWDER, FOR SOLUTION INTRAMUSCULAR; INTRAVENOUS at 05:44

## 2017-01-01 RX ADMIN — LISINOPRIL 20 MG: 20 TABLET ORAL at 09:26

## 2017-01-01 RX ADMIN — OMEPRAZOLE 20 MG: 20 CAPSULE, DELAYED RELEASE ORAL at 08:22

## 2017-01-01 RX ADMIN — INSULIN LISPRO 3 UNITS: 100 INJECTION, SOLUTION INTRAVENOUS; SUBCUTANEOUS at 20:37

## 2017-01-01 RX ADMIN — CHOLECALCIFEROL TAB 25 MCG (1000 UNIT) 1000 UNITS: 25 TAB at 08:53

## 2017-01-01 RX ADMIN — OMEPRAZOLE 20 MG: 20 CAPSULE, DELAYED RELEASE ORAL at 09:15

## 2017-01-01 RX ADMIN — CARVEDILOL 12.5 MG: 12.5 TABLET, FILM COATED ORAL at 16:34

## 2017-01-01 RX ADMIN — INSULIN HUMAN 3 UNITS: 100 INJECTION, SOLUTION PARENTERAL at 11:28

## 2017-01-01 RX ADMIN — INSULIN GLARGINE 17 UNITS: 100 INJECTION, SOLUTION SUBCUTANEOUS at 20:36

## 2017-01-01 RX ADMIN — SPIRONOLACTONE 25 MG: 25 TABLET, FILM COATED ORAL at 09:00

## 2017-01-01 RX ADMIN — FUROSEMIDE 40 MG: 40 TABLET ORAL at 17:35

## 2017-01-01 RX ADMIN — INSULIN LISPRO 3 UNITS: 100 INJECTION, SOLUTION INTRAVENOUS; SUBCUTANEOUS at 11:18

## 2017-01-01 RX ADMIN — FUROSEMIDE 40 MG: 10 INJECTION, SOLUTION INTRAVENOUS at 16:55

## 2017-01-01 RX ADMIN — Medication 325 MG: at 08:47

## 2017-01-01 RX ADMIN — STANDARDIZED SENNA CONCENTRATE AND DOCUSATE SODIUM 2 TABLET: 8.6; 5 TABLET, FILM COATED ORAL at 08:44

## 2017-01-01 RX ADMIN — ENOXAPARIN SODIUM 40 MG: 100 INJECTION SUBCUTANEOUS at 09:11

## 2017-01-01 RX ADMIN — SPIRONOLACTONE 25 MG: 25 TABLET, FILM COATED ORAL at 08:44

## 2017-01-01 RX ADMIN — DOXYCYCLINE 100 MG: 100 TABLET ORAL at 20:38

## 2017-01-01 RX ADMIN — ENOXAPARIN SODIUM 40 MG: 100 INJECTION SUBCUTANEOUS at 08:13

## 2017-01-01 RX ADMIN — GUAIFENESIN AND DEXTROMETHORPHAN 5 ML: 100; 10 SYRUP ORAL at 00:49

## 2017-01-01 RX ADMIN — OMEPRAZOLE 20 MG: 20 CAPSULE, DELAYED RELEASE ORAL at 08:20

## 2017-01-01 RX ADMIN — CARVEDILOL 12.5 MG: 12.5 TABLET, FILM COATED ORAL at 09:00

## 2017-01-01 RX ADMIN — METHYLPREDNISOLONE SODIUM SUCCINATE 40 MG: 40 INJECTION, POWDER, FOR SOLUTION INTRAMUSCULAR; INTRAVENOUS at 12:24

## 2017-01-01 RX ADMIN — INSULIN GLARGINE 10 UNITS: 100 INJECTION, SOLUTION SUBCUTANEOUS at 00:09

## 2017-01-01 RX ADMIN — CARVEDILOL 6.25 MG: 6.25 TABLET, FILM COATED ORAL at 17:20

## 2017-01-01 RX ADMIN — INSULIN LISPRO 4 UNITS: 100 INJECTION, SOLUTION INTRAVENOUS; SUBCUTANEOUS at 11:57

## 2017-01-01 RX ADMIN — CARVEDILOL 12.5 MG: 12.5 TABLET, FILM COATED ORAL at 08:44

## 2017-01-01 RX ADMIN — HEPARIN SODIUM 5000 UNITS: 5000 INJECTION, SOLUTION INTRAVENOUS; SUBCUTANEOUS at 14:50

## 2017-01-01 RX ADMIN — OMEPRAZOLE 20 MG: 20 CAPSULE, DELAYED RELEASE ORAL at 08:47

## 2017-01-01 RX ADMIN — ENOXAPARIN SODIUM 40 MG: 100 INJECTION SUBCUTANEOUS at 08:17

## 2017-01-01 RX ADMIN — Medication 325 MG: at 08:05

## 2017-01-01 RX ADMIN — Medication 325 MG: at 07:57

## 2017-01-01 RX ADMIN — ASPIRIN 243 MG: 81 TABLET, CHEWABLE ORAL at 23:12

## 2017-01-01 RX ADMIN — ASPIRIN 81 MG: 81 TABLET ORAL at 08:14

## 2017-01-01 RX ADMIN — CARVEDILOL 12.5 MG: 12.5 TABLET, FILM COATED ORAL at 08:02

## 2017-01-01 RX ADMIN — INSULIN LISPRO 7 UNITS: 100 INJECTION, SOLUTION INTRAVENOUS; SUBCUTANEOUS at 20:50

## 2017-01-01 RX ADMIN — DOXYCYCLINE 100 MG: 100 TABLET ORAL at 09:25

## 2017-01-01 RX ADMIN — ASPIRIN 81 MG: 81 TABLET ORAL at 08:39

## 2017-01-01 RX ADMIN — ATORVASTATIN CALCIUM 40 MG: 40 TABLET, FILM COATED ORAL at 22:26

## 2017-01-01 RX ADMIN — LISINOPRIL 20 MG: 20 TABLET ORAL at 09:10

## 2017-01-01 RX ADMIN — MAGNESIUM GLUCONATE 500 MG ORAL TABLET 400 MG: 500 TABLET ORAL at 08:44

## 2017-01-01 RX ADMIN — MAGNESIUM SULFATE IN WATER 4 G: 40 INJECTION, SOLUTION INTRAVENOUS at 07:57

## 2017-01-01 RX ADMIN — ASPIRIN 81 MG: 81 TABLET ORAL at 08:47

## 2017-01-01 RX ADMIN — FUROSEMIDE 40 MG: 10 INJECTION, SOLUTION INTRAVENOUS at 17:12

## 2017-01-01 RX ADMIN — POTASSIUM CHLORIDE 30 MEQ: 750 TABLET, FILM COATED, EXTENDED RELEASE ORAL at 08:51

## 2017-01-01 RX ADMIN — MAGNESIUM GLUCONATE 500 MG ORAL TABLET 400 MG: 500 TABLET ORAL at 20:38

## 2017-01-01 RX ADMIN — FUROSEMIDE 40 MG: 40 TABLET ORAL at 05:08

## 2017-01-01 RX ADMIN — INSULIN GLARGINE 10 UNITS: 100 INJECTION, SOLUTION SUBCUTANEOUS at 20:09

## 2017-01-01 RX ADMIN — INSULIN GLARGINE 17 UNITS: 100 INJECTION, SOLUTION SUBCUTANEOUS at 20:41

## 2017-01-01 RX ADMIN — ASPIRIN 81 MG: 81 TABLET ORAL at 09:00

## 2017-01-01 RX ADMIN — SPIRONOLACTONE 25 MG: 25 TABLET, FILM COATED ORAL at 08:14

## 2017-01-01 RX ADMIN — INSULIN HUMAN 6 UNITS: 100 INJECTION, SOLUTION PARENTERAL at 11:05

## 2017-01-01 RX ADMIN — SPIRONOLACTONE 25 MG: 25 TABLET, FILM COATED ORAL at 09:09

## 2017-01-01 RX ADMIN — POTASSIUM CHLORIDE AND SODIUM CHLORIDE: 900; 150 INJECTION, SOLUTION INTRAVENOUS at 05:07

## 2017-01-01 RX ADMIN — AMOXICILLIN AND CLAVULANATE POTASSIUM 1 TABLET: 875; 125 TABLET, FILM COATED ORAL at 22:05

## 2017-01-01 RX ADMIN — CHOLECALCIFEROL TAB 25 MCG (1000 UNIT) 1000 UNITS: 25 TAB at 09:00

## 2017-01-01 RX ADMIN — CARVEDILOL 6.25 MG: 6.25 TABLET, FILM COATED ORAL at 09:11

## 2017-01-01 RX ADMIN — HEPARIN SODIUM 5000 UNITS: 5000 INJECTION, SOLUTION INTRAVENOUS; SUBCUTANEOUS at 22:08

## 2017-01-01 RX ADMIN — ATORVASTATIN CALCIUM 40 MG: 40 TABLET, FILM COATED ORAL at 22:08

## 2017-01-01 RX ADMIN — INSULIN GLARGINE 15 UNITS: 100 INJECTION, SOLUTION SUBCUTANEOUS at 22:06

## 2017-01-01 RX ADMIN — OMEPRAZOLE 20 MG: 20 CAPSULE, DELAYED RELEASE ORAL at 08:39

## 2017-01-01 RX ADMIN — INSULIN HUMAN 10 UNITS: 100 INJECTION, SOLUTION PARENTERAL at 17:33

## 2017-01-01 RX ADMIN — ALBUTEROL SULFATE 2.5 MG: 2.5 SOLUTION RESPIRATORY (INHALATION) at 15:05

## 2017-01-01 RX ADMIN — STANDARDIZED SENNA CONCENTRATE AND DOCUSATE SODIUM 2 TABLET: 8.6; 5 TABLET, FILM COATED ORAL at 11:11

## 2017-01-01 RX ADMIN — CARVEDILOL 6.25 MG: 6.25 TABLET, FILM COATED ORAL at 07:58

## 2017-01-01 RX ADMIN — ATORVASTATIN CALCIUM 40 MG: 40 TABLET, FILM COATED ORAL at 20:40

## 2017-01-01 RX ADMIN — CHOLECALCIFEROL TAB 25 MCG (1000 UNIT) 1000 UNITS: 25 TAB at 09:15

## 2017-01-01 RX ADMIN — LISINOPRIL 20 MG: 20 TABLET ORAL at 08:20

## 2017-01-01 RX ADMIN — FUROSEMIDE 40 MG: 40 TABLET ORAL at 15:53

## 2017-01-01 RX ADMIN — CARVEDILOL 6.25 MG: 6.25 TABLET, FILM COATED ORAL at 08:47

## 2017-01-01 RX ADMIN — CARVEDILOL 12.5 MG: 12.5 TABLET, FILM COATED ORAL at 08:40

## 2017-01-01 RX ADMIN — POTASSIUM CHLORIDE AND SODIUM CHLORIDE: 900; 150 INJECTION, SOLUTION INTRAVENOUS at 10:22

## 2017-01-01 RX ADMIN — ENOXAPARIN SODIUM 40 MG: 100 INJECTION SUBCUTANEOUS at 09:15

## 2017-01-01 RX ADMIN — Medication 325 MG: at 08:12

## 2017-01-01 RX ADMIN — INSULIN LISPRO 7 UNITS: 100 INJECTION, SOLUTION INTRAVENOUS; SUBCUTANEOUS at 22:05

## 2017-01-01 RX ADMIN — OMEPRAZOLE 20 MG: 20 CAPSULE, DELAYED RELEASE ORAL at 08:03

## 2017-01-01 RX ADMIN — HEPARIN SODIUM 5000 UNITS: 5000 INJECTION, SOLUTION INTRAVENOUS; SUBCUTANEOUS at 20:38

## 2017-01-01 RX ADMIN — Medication 325 MG: at 08:22

## 2017-01-01 RX ADMIN — DOCUSATE SODIUM 100 MG: 100 CAPSULE ORAL at 09:11

## 2017-01-01 RX ADMIN — POTASSIUM CHLORIDE AND SODIUM CHLORIDE: 900; 150 INJECTION, SOLUTION INTRAVENOUS at 16:48

## 2017-01-01 RX ADMIN — ASPIRIN 81 MG: 81 TABLET ORAL at 08:52

## 2017-01-01 RX ADMIN — FUROSEMIDE 40 MG: 40 TABLET ORAL at 05:46

## 2017-01-01 RX ADMIN — CHOLECALCIFEROL TAB 25 MCG (1000 UNIT) 1000 UNITS: 25 TAB at 08:02

## 2017-01-01 RX ADMIN — MAGNESIUM GLUCONATE 500 MG ORAL TABLET 400 MG: 500 TABLET ORAL at 20:50

## 2017-01-01 RX ADMIN — INSULIN HUMAN 3 UNITS: 100 INJECTION, SOLUTION PARENTERAL at 20:09

## 2017-01-01 RX ADMIN — CARVEDILOL 6.25 MG: 6.25 TABLET, FILM COATED ORAL at 17:35

## 2017-01-01 RX ADMIN — FUROSEMIDE 40 MG: 40 TABLET ORAL at 05:28

## 2017-01-01 RX ADMIN — EPINEPHRINE 1 MG: 0.1 INJECTION INTRACARDIAC; INTRAVENOUS at 20:36

## 2017-01-01 RX ADMIN — ATORVASTATIN CALCIUM 40 MG: 40 TABLET, FILM COATED ORAL at 20:50

## 2017-01-01 RX ADMIN — FUROSEMIDE 40 MG: 10 INJECTION, SOLUTION INTRAVENOUS at 06:27

## 2017-01-01 RX ADMIN — HEPARIN SODIUM 5000 UNITS: 5000 INJECTION, SOLUTION INTRAVENOUS; SUBCUTANEOUS at 06:20

## 2017-01-01 RX ADMIN — HEPARIN SODIUM 5000 UNITS: 5000 INJECTION, SOLUTION INTRAVENOUS; SUBCUTANEOUS at 06:27

## 2017-01-01 RX ADMIN — POTASSIUM CHLORIDE AND SODIUM CHLORIDE: 900; 150 INJECTION, SOLUTION INTRAVENOUS at 23:14

## 2017-01-01 RX ADMIN — CEFTRIAXONE 2 G: 2 INJECTION, POWDER, FOR SOLUTION INTRAMUSCULAR; INTRAVENOUS at 09:02

## 2017-01-01 RX ADMIN — LISINOPRIL 20 MG: 20 TABLET ORAL at 09:15

## 2017-01-01 RX ADMIN — FUROSEMIDE 40 MG: 10 INJECTION, SOLUTION INTRAVENOUS at 15:40

## 2017-01-01 RX ADMIN — FUROSEMIDE 40 MG: 10 INJECTION, SOLUTION INTRAVENOUS at 10:35

## 2017-01-01 RX ADMIN — LISINOPRIL 20 MG: 20 TABLET ORAL at 07:58

## 2017-01-01 RX ADMIN — LISINOPRIL 20 MG: 20 TABLET ORAL at 09:00

## 2017-01-01 RX ADMIN — GUAIFENESIN AND DEXTROMETHORPHAN 5 ML: 100; 10 SYRUP ORAL at 02:21

## 2017-01-01 RX ADMIN — CARVEDILOL 6.25 MG: 6.25 TABLET, FILM COATED ORAL at 17:13

## 2017-01-01 RX ADMIN — ENOXAPARIN SODIUM 40 MG: 100 INJECTION SUBCUTANEOUS at 08:12

## 2017-01-01 SDOH — ECONOMIC STABILITY - INCOME SECURITY: PROBLEM RELATED TO HOUSING AND ECONOMIC CIRCUMSTANCES, UNSPECIFIED: Z59.9

## 2017-01-01 ASSESSMENT — ENCOUNTER SYMPTOMS
PALPITATIONS: 0
WEAKNESS: 1
CHILLS: 0
WEAKNESS: 1
FOCAL WEAKNESS: 0
ORTHOPNEA: 0
WEAKNESS: 0
SHORTNESS OF BREATH: 1
FOCAL WEAKNESS: 0
COUGH: 0
VOMITING: 0
SHORTNESS OF BREATH: 1
CONSTIPATION: 0
WEAKNESS: 0
SHORTNESS OF BREATH: 1
MYALGIAS: 0
DIAPHORESIS: 0
ABDOMINAL PAIN: 0
HEADACHES: 0
PALPITATIONS: 0
WHEEZING: 1
EYE DISCHARGE: 0
COUGH: 0
MYALGIAS: 0
CONSTIPATION: 0
VOMITING: 0
MYALGIAS: 0
TINGLING: 0
SENSORY CHANGE: 0
FOCAL WEAKNESS: 0
HEADACHES: 0
NAUSEA: 0
DIZZINESS: 0
DEPRESSION: 0
COUGH: 1
PALPITATIONS: 0
COUGH: 1
BLURRED VISION: 0
DIARRHEA: 0
WEAKNESS: 0
SENSORY CHANGE: 0
VOMITING: 0
CLAUDICATION: 0
PALPITATIONS: 0
BLURRED VISION: 0
ABDOMINAL PAIN: 0
SENSORY CHANGE: 0
EYE PAIN: 0
PHOTOPHOBIA: 0
FALLS: 0
BACK PAIN: 0
DIZZINESS: 0
HEADACHES: 0
CONSTIPATION: 0
BLOOD IN STOOL: 0
LOSS OF CONSCIOUSNESS: 0
LOSS OF CONSCIOUSNESS: 0
ABDOMINAL PAIN: 0
EYE PAIN: 0
LOSS OF CONSCIOUSNESS: 0
HEADACHES: 0
WEIGHT LOSS: 0
PALPITATIONS: 0
EYE DISCHARGE: 0
DEPRESSION: 0
DEPRESSION: 0
COUGH: 1
COUGH: 0
SHORTNESS OF BREATH: 1
PALPITATIONS: 0
NAUSEA: 0
PALPITATIONS: 0
CHILLS: 0
EYE DISCHARGE: 0
TINGLING: 0
MYALGIAS: 0
CHILLS: 0
PALPITATIONS: 0
MYALGIAS: 0
BLURRED VISION: 0
NAUSEA: 0
ABDOMINAL PAIN: 0
SPUTUM PRODUCTION: 0
WEAKNESS: 0
PALPITATIONS: 0
DIAPHORESIS: 0
BLOOD IN STOOL: 0
SPEECH CHANGE: 0
NAUSEA: 0
LOSS OF CONSCIOUSNESS: 0
MYALGIAS: 0
SENSORY CHANGE: 0
DEPRESSION: 0
HEMOPTYSIS: 0
HEMOPTYSIS: 0
SPEECH CHANGE: 0
CHILLS: 0
LOSS OF CONSCIOUSNESS: 0
FEVER: 0
SPUTUM PRODUCTION: 0
NAUSEA: 0
DIARRHEA: 0
TINGLING: 0
WEAKNESS: 0
DIAPHORESIS: 0
SPEECH CHANGE: 0
FEVER: 0
EYE DISCHARGE: 0
NAUSEA: 0
ABDOMINAL PAIN: 0
SORE THROAT: 0
HEADACHES: 0
DIAPHORESIS: 0
FEVER: 0
LOSS OF CONSCIOUSNESS: 0
DIARRHEA: 0
SPEECH CHANGE: 0
SENSORY CHANGE: 0
FOCAL WEAKNESS: 0
ABDOMINAL PAIN: 0
FOCAL WEAKNESS: 0
CONSTIPATION: 0
TINGLING: 0
CHILLS: 0
DIZZINESS: 0
ORTHOPNEA: 1
CONSTIPATION: 0
CLAUDICATION: 0
CLAUDICATION: 0
NAUSEA: 0
WEAKNESS: 0
PND: 0
DIARRHEA: 0
MYALGIAS: 0
SPUTUM PRODUCTION: 0
BACK PAIN: 0
CONSTIPATION: 0
WEAKNESS: 0
CHILLS: 0
HEMOPTYSIS: 0
FEVER: 0
CLAUDICATION: 0
BLOOD IN STOOL: 0
VOMITING: 0
BLOOD IN STOOL: 0
HEADACHES: 0
SENSORY CHANGE: 0
VOMITING: 0
ABDOMINAL PAIN: 0
SHORTNESS OF BREATH: 0
DIARRHEA: 0
ABDOMINAL PAIN: 0
WHEEZING: 0
HEADACHES: 0
EYE PAIN: 0
SHORTNESS OF BREATH: 1
SHORTNESS OF BREATH: 1
CONSTIPATION: 0
COUGH: 0
CLAUDICATION: 0
DIZZINESS: 0
DIAPHORESIS: 0
WHEEZING: 0
DIZZINESS: 0
VOMITING: 0
BLURRED VISION: 0
TINGLING: 0
DIAPHORESIS: 0
DIARRHEA: 0
MYALGIAS: 0
DIARRHEA: 0
BLOOD IN STOOL: 0
NAUSEA: 0
FOCAL WEAKNESS: 0
ORTHOPNEA: 1
DIZZINESS: 0
DIZZINESS: 0
SHORTNESS OF BREATH: 0
NAUSEA: 0
TINGLING: 0
SORE THROAT: 0
WEAKNESS: 1
DIARRHEA: 0
HEADACHES: 0
FEVER: 0
HEMOPTYSIS: 0
ABDOMINAL PAIN: 0
FALLS: 0
ORTHOPNEA: 0
PSYCHIATRIC NEGATIVE: 1
SHORTNESS OF BREATH: 1
NAUSEA: 0
WEAKNESS: 0
DIAPHORESIS: 0
DOUBLE VISION: 0
SORE THROAT: 0
SPEECH CHANGE: 0
FEVER: 0
WEIGHT LOSS: 0
FOCAL WEAKNESS: 0
SENSORY CHANGE: 0
SORE THROAT: 0
CONSTIPATION: 0
EYE PAIN: 0
DIARRHEA: 0
PALPITATIONS: 0
DOUBLE VISION: 0
SHORTNESS OF BREATH: 1
CHILLS: 0
SENSORY CHANGE: 0
CHILLS: 0
FOCAL WEAKNESS: 0
VOMITING: 0
SPUTUM PRODUCTION: 1
SENSORY CHANGE: 0
MYALGIAS: 0
HEMOPTYSIS: 0
VOMITING: 0
DIARRHEA: 0
WEAKNESS: 1
SORE THROAT: 0
SORE THROAT: 0
MEMORY LOSS: 1
CLAUDICATION: 0
DIZZINESS: 1
FEVER: 0
HEADACHES: 0
HEMOPTYSIS: 0
CONSTIPATION: 0
ABDOMINAL PAIN: 0
VOMITING: 0
DIZZINESS: 1
COUGH: 1
ABDOMINAL PAIN: 0
CONSTIPATION: 0
ABDOMINAL PAIN: 0
PALPITATIONS: 0
FOCAL WEAKNESS: 0
DIZZINESS: 0
PND: 0
VOMITING: 0
WEAKNESS: 0
VOMITING: 0
CHILLS: 0
FEVER: 0
WEAKNESS: 0
SORE THROAT: 0
MYALGIAS: 0
VOMITING: 0
PND: 0
BLURRED VISION: 0
MYALGIAS: 0
BLURRED VISION: 0
COUGH: 1
SHORTNESS OF BREATH: 0
SORE THROAT: 0
COUGH: 0
ABDOMINAL PAIN: 0
CONSTIPATION: 0
SORE THROAT: 1
SENSORY CHANGE: 0
NAUSEA: 0
MYALGIAS: 1
DEPRESSION: 0
NAUSEA: 0
BLOOD IN STOOL: 0
ORTHOPNEA: 0
COUGH: 1
FEVER: 0
FOCAL WEAKNESS: 0
LOSS OF CONSCIOUSNESS: 0
DEPRESSION: 0
COUGH: 0
LOSS OF CONSCIOUSNESS: 0
MYALGIAS: 0
PALPITATIONS: 0
SPUTUM PRODUCTION: 0
CONSTIPATION: 0
WEAKNESS: 1
HEMOPTYSIS: 0
EYE PAIN: 0
SPUTUM PRODUCTION: 0
SHORTNESS OF BREATH: 0
NECK PAIN: 0
SHORTNESS OF BREATH: 1
ORTHOPNEA: 0
CLAUDICATION: 0
COUGH: 1
NAUSEA: 0
VOMITING: 0
DIZZINESS: 0
WEAKNESS: 1
NECK PAIN: 0
PALPITATIONS: 0
DIARRHEA: 0
DIZZINESS: 0
SORE THROAT: 0
ABDOMINAL PAIN: 0
SPEECH CHANGE: 0
NECK PAIN: 0
FEVER: 0
SHORTNESS OF BREATH: 1
HEADACHES: 0
SPEECH CHANGE: 0
PALPITATIONS: 0
DIAPHORESIS: 0
SHORTNESS OF BREATH: 1
SENSORY CHANGE: 0
HEMOPTYSIS: 0
LOSS OF CONSCIOUSNESS: 0
MYALGIAS: 0
ORTHOPNEA: 0
BRUISES/BLEEDS EASILY: 0
CONSTIPATION: 0
DIZZINESS: 1
MYALGIAS: 0
COUGH: 0
DIZZINESS: 0
HEADACHES: 0
NAUSEA: 0
SHORTNESS OF BREATH: 0
COUGH: 1
ORTHOPNEA: 0
HEARTBURN: 0
DIARRHEA: 0
MYALGIAS: 0
WHEEZING: 1
FOCAL WEAKNESS: 0
CONSTIPATION: 0
FEVER: 0
WEAKNESS: 0
SPUTUM PRODUCTION: 0
DIZZINESS: 1
ABDOMINAL PAIN: 0
FLANK PAIN: 0
ORTHOPNEA: 1
CHILLS: 0
FOCAL WEAKNESS: 0
SHORTNESS OF BREATH: 0
SORE THROAT: 0
LOSS OF CONSCIOUSNESS: 0
SORE THROAT: 0
LOSS OF CONSCIOUSNESS: 0
ORTHOPNEA: 1
SHORTNESS OF BREATH: 0
FOCAL WEAKNESS: 0
DIARRHEA: 0
COUGH: 1
COUGH: 0
VOMITING: 0
CHILLS: 0
DIZZINESS: 0
BRUISES/BLEEDS EASILY: 0
PALPITATIONS: 0
NAUSEA: 0
CHILLS: 0
DIZZINESS: 0
SHORTNESS OF BREATH: 1
DIZZINESS: 0
NECK PAIN: 0
HEMOPTYSIS: 0
ABDOMINAL PAIN: 0
CHILLS: 0
DIAPHORESIS: 0
ORTHOPNEA: 1
SPUTUM PRODUCTION: 0
SPUTUM PRODUCTION: 0
FEVER: 0
HEADACHES: 0
VOMITING: 0
DIAPHORESIS: 0
HEADACHES: 0
EYE DISCHARGE: 0
PALPITATIONS: 0
SENSORY CHANGE: 0
MYALGIAS: 0
BRUISES/BLEEDS EASILY: 0
ABDOMINAL PAIN: 0
FEVER: 0
WEAKNESS: 1
FEVER: 0
FOCAL WEAKNESS: 0
PALPITATIONS: 0
BACK PAIN: 0
CLAUDICATION: 0
CHILLS: 0
BRUISES/BLEEDS EASILY: 0
FEVER: 0
PND: 0
HEADACHES: 0
BRUISES/BLEEDS EASILY: 0
HALLUCINATIONS: 0
DIZZINESS: 0
SPEECH CHANGE: 0
CLAUDICATION: 0
HEADACHES: 0
HEMOPTYSIS: 0
DIARRHEA: 0
BACK PAIN: 0
BLOOD IN STOOL: 0
PND: 0
CHILLS: 0
BLURRED VISION: 0

## 2017-01-01 ASSESSMENT — PAIN SCALES - GENERAL
PAINLEVEL_OUTOF10: 3
PAINLEVEL_OUTOF10: 0
PAINLEVEL_OUTOF10: 3
PAINLEVEL_OUTOF10: 0
PAINLEVEL_OUTOF10: 3
PAINLEVEL_OUTOF10: 3
PAINLEVEL_OUTOF10: 0
PAINLEVEL_OUTOF10: 7
PAINLEVEL_OUTOF10: 0
PAINLEVEL_OUTOF10: 0
PAINLEVEL_OUTOF10: 5
PAINLEVEL_OUTOF10: 0
PAINLEVEL_OUTOF10: 5
PAINLEVEL_OUTOF10: 0
PAINLEVEL_OUTOF10: 5
PAINLEVEL_OUTOF10: 0
PAINLEVEL_OUTOF10: 3
PAINLEVEL_OUTOF10: 0
PAINLEVEL_OUTOF10: 8
PAINLEVEL_OUTOF10: 0
PAINLEVEL_OUTOF10: 4
PAINLEVEL_OUTOF10: 0
PAINLEVEL_OUTOF10: 3
PAINLEVEL_OUTOF10: 6
PAINLEVEL_OUTOF10: 5
PAINLEVEL_OUTOF10: 0

## 2017-01-01 ASSESSMENT — LIFESTYLE VARIABLES
EVER_SMOKED: NEVER
ALCOHOL_USE: NO
DO YOU DRINK ALCOHOL: NO
EVER_SMOKED: NEVER
ALCOHOL_USE: NO
EVER_SMOKED: NEVER
EVER_SMOKED: NEVER
SUBSTANCE_ABUSE: 0
ALCOHOL_USE: NO
EVER_SMOKED: NEVER
SUBSTANCE_ABUSE: 0
DO YOU DRINK ALCOHOL: NO
EVER_SMOKED: NEVER
SUBSTANCE_ABUSE: 0
EVER_SMOKED: NEVER
EVER_SMOKED: NEVER
SUBSTANCE_ABUSE: 0

## 2017-01-01 ASSESSMENT — MINNESOTA LIVING WITH HEART FAILURE QUESTIONNAIRE (MLHF)
MAKING YOU WORRY: 5
FEELING LIKE A BURDEN TO FAMILY AND FRIENDS: 4
LOSS OF SELF CONTROL IN YOUR LIFE: 5
DIFFICULTY SLEEPING WELL AT NIGHT: 4
WALKING ABOUT OR CLIMBING STAIRS DIFFICULT: 3
WORKING AROUND THE HOUSE OR YARD DIFFICULT: 4
EATING LESS FOODS YOU LIKE: 2
DIFFICULTY GOING AWAY FROM HOME: 2
DIFFICULTY WORKING TO EARN A LIVING: 5
TIRED, FATIGUED OR LOW ON ENERGY: 0
MAKING YOU SHORT OF BREATH: 3
HAVING TO SIT OR LIE DOWN DURING THE DAY: 2
MAKING YOU STAY IN A HOSPITAL: 3
DIFFICULTY WITH SEXUAL ACTIVITIES: 5
DIFFICULTY WITH RECREATIONAL PASTIMES, SPORTS, HOBBIES: 5
DIFFICULTY TO CONCENTRATE OR REMEMBERING THINGS: 5
GIVING YOU SIDE EFFECTS FROM TREATMENTS: 2
COSTING YOU MONEY FOR MEDICAL CARE: 5
DIFFICULTY SOCIALIZING WITH FAMILY OR FRIENDS: 0
MAKING YOU FEEL DEPRESSED: 2
SWELLING IN ANKLES OR LEGS: 5
TOTAL_SCORE: 71

## 2017-01-01 ASSESSMENT — GAIT ASSESSMENTS
GAIT LEVEL OF ASSIST: CONTACT GUARD ASSIST
DEVIATION: OTHER (COMMENT)
ASSISTIVE DEVICE: SINGLE POINT CANE;NONE
DISTANCE (FEET): 500
GAIT LEVEL OF ASSIST: SUPERVISED
GAIT LEVEL OF ASSIST: STAND BY ASSIST
DISTANCE (FEET): 200
DEVIATION: OTHER (COMMENT)
DISTANCE (FEET): 100

## 2017-01-01 ASSESSMENT — COPD QUESTIONNAIRES
DO YOU EVER COUGH UP ANY MUCUS OR PHLEGM?: NO/ONLY WITH OCCASIONAL COLDS OR INFECTIONS
DO YOU EVER COUGH UP ANY MUCUS OR PHLEGM?: NO/ONLY WITH OCCASIONAL COLDS OR INFECTIONS
HAVE YOU SMOKED AT LEAST 100 CIGARETTES IN YOUR ENTIRE LIFE: NO/DON'T KNOW
HAVE YOU SMOKED AT LEAST 100 CIGARETTES IN YOUR ENTIRE LIFE: NO/DON'T KNOW
DURING THE PAST 4 WEEKS HOW MUCH DID YOU FEEL SHORT OF BREATH: SOME OF THE TIME
DURING THE PAST 4 WEEKS HOW MUCH DID YOU FEEL SHORT OF BREATH: SOME OF THE TIME
COPD SCREENING SCORE: 2
COPD SCREENING SCORE: 3

## 2017-01-01 ASSESSMENT — PATIENT HEALTH QUESTIONNAIRE - PHQ9: CLINICAL INTERPRETATION OF PHQ2 SCORE: 0

## 2017-01-01 ASSESSMENT — NEW YORK HEART ASSOCIATION (NYHA) CLASSIFICATION: NYHA FUNCTIONAL CLASS: CLASS III

## 2017-01-01 ASSESSMENT — ACTIVITIES OF DAILY LIVING (ADL): TOILETING: INDEPENDENT

## 2017-01-04 NOTE — PROGRESS NOTES
Brought patient back to the room.  Scanned right chest and found a pocket of fluid.  Called Dr Caruso. He came and performed the thoracentesis.  Monitored patients vitals during procedure.  Removed 2000 ml of fluid and sent 0 ml to lab.  Ordered chest xray.  Patient tolerated procedure well and left in stable condition.

## 2017-01-05 NOTE — TELEPHONE ENCOUNTER
S/W Alisha> PT will be DC tomorrow from rehab. Alisha will call to make an appt per Dr. MARTINEZ recommendation.  Alisha GOTTI    ----- Message from Vania Stubbs sent at 1/5/2017 12:47 PM PST -----  Regarding: Alisha @ Rehab would like a call back  LA/Alisha Brito @ [facility] called. She said Dr. Pam Bautista wanted to see the patient, but he has been in and out of the hospital/rehab, they want to know if Dr. Bautista still wants to see the patient. She can be reached at [phone].

## 2017-01-06 NOTE — PROGRESS NOTES
"1-6-17 Received call back from hospital .  States that patient is leaving rehab hospital today.  He is expecting discharge outreach call.   When I dial  The number listed for him [phone], it goes to a voice mail message for a Crystal.  I am unable to leave a message at this number as the voice mail box is full.  I Attempted to call Ying Sandoval at the Rehab hospital in an attempt to contact the patient there.  Left message.  Await return call.     1-6-17 Spoke with patient regarding discharge outreach TCM call.  He adamantly refuses to attend the discharge clinic appointment today.  He is also declining home health.  I offered to provide him with cab transportation from the Rehab hospital to the discharge clinic.  He continued to decline.  States he is \"overwhelmed with everything about going home\".  He states that he will go to his new PCP appointment on 1-10-17.  Ying and Rina notified of patient's decision.  Discharge clinic appointment cancelled.       "

## 2017-01-18 PROBLEM — R09.02 HYPOXIA: Status: ACTIVE | Noted: 2017-01-01

## 2017-01-18 PROBLEM — I50.9 ACUTE EXACERBATION OF CHF (CONGESTIVE HEART FAILURE) (HCC): Status: ACTIVE | Noted: 2017-01-01

## 2017-01-18 NOTE — IP AVS SNAPSHOT
After Visit Summary                                                                                                                  Name:Abner Torres  Medical Record Number:[id number]  CSN: [id number]    YOB: 1960   Age: 56 y.o.  Sex: male  HT:1.829 m (6') WT: 86.2 kg (190 lb 0.6 oz)          Admit Date: 1/18/2017     Discharge Date:   Today's Date: 1/25/2017  Attending Doctor:  Nancy Shook M.D.                  Allergies:  Review of patient's allergies indicates no known allergies.            Discharge Instructions       Discharge Instructions    Discharged to home by car with self. Discharged via walking, hospital escort: Refused.  Special equipment needed: Not Applicable    Be sure to schedule a follow-up appointment with your primary care doctor or any specialists as instructed.     Discharge Plan:   Diet Plan: Discussed  Activity Level: Discussed  Confirmed Follow up Appointment: Appointment Scheduled  Confirmed Symptoms Management: Discussed  Medication Reconciliation Updated: Yes  Influenza Vaccine Indication: Not indicated: Previously immunized this influenza season and > 8 years of age    I understand that a diet low in cholesterol, fat, and sodium is recommended for good health. Unless I have been given specific instructions below for another diet, I accept this instruction as my diet prescription.   Other diet: low sodium    Special Instructions:   HF Patient Discharge Instructions  · Monitor your weight daily, and maintain a weight chart, to track your weight changes.   · Activity as tolerated, unless your Doctor has ordered otherwise.  · Follow a low fat, low cholesterol, low salt diet unless instructed otherwise by your Doctor. Read the labels on the back of food products and track your intake of fat, cholesterol and salt.   · Fluid Restriction No. If a Fluid Restriction has been ordered by your Doctor, measure fluids with a measuring cup to ensure that you are not exceeding the  restriction.   · No smoking.  · Oxygen No. If your Doctor has ordered that you wear Oxygen at home, it is important to wear it as ordered.  · Did you receive an explanation from staff on the importance of taking each of your medications and why it is necessary to keep taking them unless your doctor says to stop? Yes  · Were all of your questions answered about how to manage your heart failure and what to do if you have increased signs and symptoms after you go home? Yes  · Do you feel like your heart failure care team involved you in the care treatment plan and allowed you to make decisions regarding your care while in the hospital and addressed any discharge needs you might have? Yes    See the educational handout provided at discharge for more information on monitoring your daily weight, activity and diet. This also explains more about Heart Failure, symptoms of a flare-up and some of the tests that you have undergone.     Warning Signs of a Flare-Up include:  · Swelling in the ankles or lower legs.  · Shortness of breath, while at rest, or while doing normal activities.   · Shortness of breath at night when in bed, or coughing in bed.   · Requiring more pillows to sleep at night, or needing to sit up at night to sleep.  · Feeling weak, dizzy or fatigued.     When to call your Doctor:  · [facility] about questions regarding the discharge instructions you were given [phone].  · Call your Primary Care Physician or Cardiologist if:   1. You experience any pain radiating to your jaw or neck.  2. You have any difficulty breathing.  3. You experience weight gain of 3 lbs in a day or 5 lbs in a week.   4. You feel any palpitations or irregular heartbeats.  5. You become dizzy or lose consciousness.   If you have had an angiogram or had a pacemaker or AICD placed, and experience:  1. Bleeding, drainage or swelling at the surgical / puncture site.  2. Fever greater than 100.0 F  3. Shock from  internal defibrillator.  4. Cool and / or numb extremities.      · Is patient discharged on Warfarin / Coumadin?   No     · Is patient Post Blood Transfusion?  No    Depression / Suicide Risk    As you are discharged from this [facility] facility, it is important to learn how to keep safe from harming yourself.    Recognize the warning signs:  · Abrupt changes in personality, positive or negative- including increase in energy   · Giving away possessions  · Change in eating patterns- significant weight changes-  positive or negative  · Change in sleeping patterns- unable to sleep or sleeping all the time   · Unwillingness or inability to communicate  · Depression  · Unusual sadness, discouragement and loneliness  · Talk of wanting to die  · Neglect of personal appearance   · Rebelliousness- reckless behavior  · Withdrawal from people/activities they love  · Confusion- inability to concentrate     If you or a loved one observes any of these behaviors or has concerns about self-harm, here's what you can do:  · Talk about it- your feelings and reasons for harming yourself  · Remove any means that you might use to hurt yourself (examples: pills, rope, extension cords, firearm)  · Get professional help from the community (Mental Health, Substance Abuse, psychological counseling)  · Do not be alone:Call your Safe Contact- someone whom you trust who will be there for you.  · Call your local CRISIS HOTLINE 568-4656 or 737-092-1119  · Call your local Children's Mobile Crisis Response Team [place] (289) 906-3902 or www.[employer]  · Call the toll free National Suicide Prevention Hotlines   · National Suicide Prevention Lifeline 044-091-KCVP (6720)  · National Hope Line Network 800-SUICIDE (787-9768)        Your appointments     Jan 30, 2017  1:40 PM   HOSPITAL FOLLOW UP with STEPHANIE Valle.   [facility] (--)    [address], [address]  [place] [address]      [phone]            Jan 31, 2017  1:20 PM   New Patient with NNEKA Christine   [facility] [address]    [address]  [address]  [place] [address]   [phone]           Please bring Photo ID, Insurance Cards, All Medication Bottles and copies of any legal documents (such as Living Will, Power of ) If speaking a language besides English please bring an adult . Please arrive 30 minutes prior for check in and registration. You will be receiving a confirmation call a few days before your appointment from our automated call confirmation system.                 Discharge Medication Instructions:    Below are the medications your physician expects you to take upon discharge:    Review all your home medications and newly ordered medications with your doctor and/or pharmacist. Follow medication instructions as directed by your doctor and/or pharmacist.    Please keep your medication list with you and share with your physician.               Medication List      START taking these medications        Instructions    aspirin 81 MG EC tablet   Last time this was given:  81 mg on 1/25/2017  8:12 AM   Next Dose Due:  1/26    Take 1 Tab by mouth every day.   Dose:  81 mg       atorvastatin 40 MG Tabs   Last time this was given:  40 mg on 1/24/2017  8:34 PM   Commonly known as:  LIPITOR   Next Dose Due:  9 pm    Take 1 Tab by mouth every bedtime.   Dose:  40 mg       carvedilol 6.25 MG Tabs   Last time this was given:  6.25 mg on 1/25/2017  8:12 AM   Commonly known as:  COREG   Next Dose Due:  5 pm    Take 1 Tab by mouth 2 times a day, with meals.   Dose:  6.25 mg       ferrous sulfate 325 (65 FE) MG tablet   Last time this was given:  325 mg on 1/25/2017  8:12 AM   Next Dose Due:  1/26    Take 1 Tab by mouth every morning with breakfast.   Dose:  325 mg       furosemide 40 MG Tabs   Last time this was given:  40 mg on 1/25/2017  5:37 AM   Commonly known as:  LASIX   Next  Dose Due:  1/26    Take 1 Tab by mouth every day.   Dose:  40 mg       insulin glargine 100 UNIT/ML Soln   Last time this was given:  17 Units on 1/24/2017  8:36 PM   Commonly known as:  LANTUS   Next Dose Due:  9 pm    Inject 17 Units as instructed every evening.   Dose:  17 Units       lisinopril 20 MG Tabs   Last time this was given:  20 mg on 1/25/2017  8:12 AM   Commonly known as:  PRINIVIL   Next Dose Due:  1/26    Take 1 Tab by mouth every day.   Dose:  20 mg       omeprazole 20 MG delayed-release capsule   Last time this was given:  20 mg on 1/25/2017  8:12 AM   Commonly known as:  PRILOSEC   Next Dose Due:  1/26    Take 1 Cap by mouth every day.   Dose:  20 mg       potassium chloride SA 10 MEQ Tbcr   Last time this was given:  20 mEq on 1/22/2017  7:30 AM   Commonly known as:  K-DUR   Next Dose Due:  1/26    Take 1 Tab by mouth every day.   Dose:  10 mEq       spironolactone 25 MG Tabs   Last time this was given:  25 mg on 1/25/2017  8:12 AM   Commonly known as:  ALDACTONE   Next Dose Due:  1/26    Take 1 Tab by mouth every day.   Dose:  25 mg               Instructions           Diet / Nutrition:    Follow any diet instructions given to you by your doctor or the dietician, including how much salt (sodium) you are allowed each day.    If you are overweight, talk to your doctor about a weight reduction plan.    Activity:    Remain physically active following your doctor's instructions about exercise and activity.    Rest often.     Any time you become even a little tired or short of breath, SIT DOWN and rest.    Worsening Symptoms:    Report any of the following signs and symptoms to the doctor's office immediately:    *Pain of jaw, arm, or neck  *Chest pain not relieved by medication                               *Dizziness or loss of consciousness  *Difficulty breathing even when at rest   *More tired than usual                                       *Bleeding drainage or swelling of surgical  site  *Swelling of feet, ankles, legs or stomach                 *Fever (>100ºF)  *Pink or blood tinged sputum  *Weight gain (3lbs/day or 5lbs /week)           *Shock from internal defibrillator (if applicable)  *Palpitations or irregular heartbeats                *Cool and/or numb extremities    Stroke Awareness    Common Risk Factors for Stroke include:    Age  Atrial Fibrillation  Carotid Artery Stenosis  Diabetes Mellitus  Excessive alcohol consumption  High blood pressure  Overweight   Physical inactivity  Smoking    Warning signs and symptoms of a stroke include:    *Sudden numbness or weakness of the face, arm or leg (especially on one side of the body).  *Sudden confusion, trouble speaking or understanding.  *Sudden trouble seeing in one or both eyes.  *Sudden trouble walking, dizziness, loss of balance or coordination.Sudden severe headache with no known cause.    It is very important to get treatment quickly when a stroke occurs. If you experience any of the above warning signs, call 911 immediately.                   Disclaimer         Quit Smoking / Tobacco Use:    I understand the use of any tobacco products increases my chance of suffering from future heart disease or stroke and could cause other illnesses which may shorten my life. Quitting the use of tobacco products is the single most important thing I can do to improve my health. For further information on smoking / tobacco cessation call a Toll Free Quit Line at 1-898.568.2117 (*National Cancer [place]) or 1-384.525.3815 (American Lung Association) or you can access the web based program at www.lungusa.org.    [place] Tobacco Users Help Line:  (693) 688-7736       Toll Free: 1-439.874.7961  Quit Tobacco Program [facility] (079)810-8393    Crisis Hotline:    [place] Crisis Hotline:  3-805-MCIXPEP or 1-142.852.6879    [place] Crisis Hotline:    1-258.512.3832 or 470-849-1137    Discharge Survey:   Thank you for choosing  [facility]. We hope we did everything we could to make your hospital stay a pleasant one. You may be receiving a phone survey and we would appreciate your time and participation in answering the questions. Your input is very valuable to us in our efforts to improve our service to our patients and their families.        My signature on this form indicates that:    1. I have reviewed and understand the above information.  2. My questions regarding this information have been answered to my satisfaction.  3. I have formulated a plan with my discharge nurse to obtain my prescribed medications for home.                  Disclaimer         __________________________________                     __________       ________                       Patient Signature                                                 Date                    Time

## 2017-01-18 NOTE — IP AVS SNAPSHOT
" <p align=\"LEFT\"><IMG SRC=\"//[email or web address]$/[email or web address]\" alt=\"Image\" WIDTH=\"50%\" HEIGHT=\"200\" BORDER=\"\"></p>                   Name:Abner Torres  Medical Record Number:[id number]  CSN: [id number]    YOB: 1960   Age: 56 y.o.  Sex: male  HT:1.829 m (6') WT: 86.2 kg (190 lb 0.6 oz)          Admit Date: 1/18/2017     Discharge Date:   Today's Date: 1/25/2017  Attending Doctor:  Nancy Shook M.D.                  Allergies:  Review of patient's allergies indicates no known allergies.          Your appointments     Jan 30, 2017  1:40 PM   HOSPITAL FOLLOW UP with NNEKA Valle   [facility] (--)    [address]  [place] [address] [phone]            Jan 31, 2017  1:20 PM   New Patient with NNEKA Christine   [facility] [address]    [address]  [address]  [place] [address] [phone]           Please bring Photo ID, Insurance Cards, All Medication Bottles and copies of any legal documents (such as Living Will, Power of ) If speaking a language besides English please bring an adult . Please arrive 30 minutes prior for check in and registration. You will be receiving a confirmation call a few days before your appointment from our automated call confirmation system.                 Medication List      Take these Medications        Instructions    aspirin 81 MG EC tablet    Take 1 Tab by mouth every day.   Dose:  81 mg       atorvastatin 40 MG Tabs   Commonly known as:  LIPITOR    Take 1 Tab by mouth every bedtime.   Dose:  40 mg       carvedilol 6.25 MG Tabs   Commonly known as:  COREG    Take 1 Tab by mouth 2 times a day, with meals.   Dose:  6.25 mg       ferrous sulfate 325 (65 FE) MG tablet    Take 1 Tab by mouth every morning with breakfast.   Dose:  325 mg       furosemide 40 MG Tabs   Commonly known as:  LASIX    Take 1 Tab by mouth every day.   Dose:  40 mg      " insulin glargine 100 UNIT/ML Soln   Commonly known as:  LANTUS    Inject 17 Units as instructed every evening.   Dose:  17 Units       lisinopril 20 MG Tabs   Commonly known as:  PRINIVIL    Take 1 Tab by mouth every day.   Dose:  20 mg       omeprazole 20 MG delayed-release capsule   Commonly known as:  PRILOSEC    Take 1 Cap by mouth every day.   Dose:  20 mg       potassium chloride SA 10 MEQ Tbcr   Commonly known as:  K-DUR    Take 1 Tab by mouth every day.   Dose:  10 mEq       spironolactone 25 MG Tabs   Commonly known as:  ALDACTONE    Take 1 Tab by mouth every day.   Dose:  25 mg

## 2017-01-18 NOTE — IP AVS SNAPSHOT
1/25/2017          Abner Ilia Torres  [address]  [place] [address]    Dear Abner:    [facility] wants to ensure your discharge home is safe and you or your loved ones have had all your questions answered regarding your care after you leave the hospital.    You may receive a telephone call within two days of your discharge.  This call is to make certain you understand your discharge instructions as well as ensure we provided you with the best care possible during your stay with us.     The call will only last approximately 3-5 minutes and will be done by a nurse.    Once again, we want to ensure your discharge home is safe and that you have a clear understanding of any next steps in your care.  If you have any questions or concerns, please do not hesitate to contact us, we are here for you.  Thank you for choosing [facility] for your healthcare needs.    Sincerely,    Edy Ayala    [facility]

## 2017-01-18 NOTE — IP AVS SNAPSHOT
Stylechi Access Code: [email or web address]  Expires: 1/29/2017  8:19 AM    Your email address is not on file at [facility].  Email Addresses are required for you to sign up for Stylechi, please contact [phone] to verify your personal information and to provide your email address prior to attempting to register for Stylechi.    Abner Torres  [address]  [place] [address]    Jumptapt  A secure, online tool to manage your health information     [employer]’s Stylechi® is a secure, online tool that connects you to your personalized health information from the privacy of your home -- day or night - making it very easy for you to manage your healthcare. Once the activation process is completed, you can even access your medical information using the Stylechi brinda, which is available for free in the Apple Brinda store or Google Play store.     To learn more about Stylechi, visit [email or web address]    There are two levels of access available (as shown below):   My Chart Features  [facility] Primary Care Doctor [facility]  Specialists [facility]  Urgent  Care Non-[facility] Primary Care Doctor   Email your healthcare team securely and privately 24/7 X X X    Manage appointments: schedule your next appointment; view details of past/upcoming appointments X      Request prescription refills. X      View recent personal medical records, including lab and immunizations X X X X   View health record, including health history, allergies, medications X X X X   Read reports about your outpatient visits, procedures, consult and ER notes X X X X   See your discharge summary, which is a recap of your hospital and/or ER visit that includes your diagnosis, lab results, and care plan X X  X     How to register for Stylechi:  Once your e-mail address has been verified, follow the following steps to sign up for Jumptapt.     1. Go to  [email or web address]  2. Click on the Sign Up Now box, which takes you to the New Member Sign Up page. You  will need to provide the following information:  a. Enter your [employer] Access Code exactly as it appears at the top of this page. (You will not need to use this code after you’ve completed the sign-up process. If you do not sign up before the expiration date, you must request a new code.)   b. Enter your date of birth.   c. Enter your home email address.   d. Click Submit, and follow the next screen’s instructions.  3. Create a [employer] ID. This will be your [employer] login ID and cannot be changed, so think of one that is secure and easy to remember.  4. Create a [employer] password. You can change your password at any time.  5. Enter your Password Reset Question and Answer. This can be used at a later time if you forget your password.   6. Enter your e-mail address. This allows you to receive e-mail notifications when new information is available in [employer].  7. Click Sign Up. You can now view your health information.    For assistance activating your [employer] account, call [phone]

## 2017-01-19 PROBLEM — J90 PLEURAL EFFUSION: Status: ACTIVE | Noted: 2017-01-01

## 2017-01-19 PROBLEM — Z95.1 S/P CABG (CORONARY ARTERY BYPASS GRAFT): Status: ACTIVE | Noted: 2017-01-01

## 2017-01-19 NOTE — PROGRESS NOTES
Cardiology Progress Note               Author: Gatito Wade Date & Time created: 2017  10:37 AM     Interval History:  Resting  Less SOB  Meds have been restarted (carvedilol at 12.5)  Normotensive  Monitor reviewed by me showed no significant ventricular or atrial dysrhythmias.      Review of Systems   Constitutional: Negative for fever.   Respiratory: Positive for shortness of breath. Negative for cough.    Cardiovascular: Negative for chest pain and palpitations.   Gastrointestinal: Negative for nausea and vomiting.   Neurological: Negative for dizziness.       Physical Exam   Constitutional: No distress.   HENT:   Mouth/Throat: Mucous membranes are normal.   Eyes: Conjunctivae and EOM are normal.   Neck: No tracheal deviation present. No thyroid mass and no thyromegaly present.   Cardiovascular: Normal rate, regular rhythm and intact distal pulses.    No murmur heard.  Pulmonary/Chest: Effort normal. No respiratory distress. He has decreased breath sounds in the right lower field and the left lower field. He has no rales. He exhibits no tenderness.   Abdominal: Soft. There is no tenderness.   Musculoskeletal: He exhibits no edema.   Neurological: He is alert. He has normal strength. He displays no tremor.   Skin: Skin is warm and dry. He is not diaphoretic.   Vitals reviewed.      Hemodynamics:  Temp (24hrs), Av.4 °C (97.5 °F), Min:36.1 °C (97 °F), Max:36.7 °C (98 °F)  Temperature: 36.1 °C (97 °F)  Pulse  Av.7  Min: 67  Max: 97Heart Rate (Monitored): 76  Blood Pressure: 121/80 mmHg, NIBP: 144/99 mmHg     Respiratory:    Respiration: 16, Pulse Oximetry: 95 %, O2 Daily Delivery Respiratory : Silicone Nasal Cannula     Work Of Breathing / Effort: Mild  RUL Breath Sounds: Diminished, RML Breath Sounds: Diminished, RLL Breath Sounds: Diminished, MIKE Breath Sounds: Diminished, LLL Breath Sounds: Diminished  Fluids:  Date 17 0700 - 17 0659   Shift 5939-5685 7132-4882 0203-3147 24 Hour  Total   I  N  T  A  K  E   P.O. 240   240    Shift Total 240   240   O  U  T  P  U  T   Urine 550   550    Shift Total 550   550   Weight (kg) 91.1 91.1 91.1 91.1       Weight: 91.1 kg (200 lb 13.4 oz)  GI/Nutrition:  Orders Placed This Encounter   Procedures   • Diet NPO     Standing Status: Standing      Number of Occurrences: 8      Standing Expiration Date:      Order Specific Question:  Restrict to:     Answer:  Sips with Medications [3]     Lab Results:  Recent Labs      01/18/17   1705  01/19/17   0243   WBC  6.8  7.2   RBC  4.41*  4.08*   HEMOGLOBIN  11.6*  11.2*   HEMATOCRIT  38.6*  35.5*   MCV  87.5  87.0   MCH  26.3*  27.5   MCHC  30.1*  31.5*   RDW  54.8*  53.7*   PLATELETCT  184  150*   MPV  11.0  10.7     Recent Labs      01/18/17   1705 01/19/17   0243   SODIUM  136  137   POTASSIUM  4.0  3.9   CHLORIDE  108  106   CO2  19*  25   GLUCOSE  556*  453*   BUN  25*  23*   CREATININE  0.85  0.85   CALCIUM  8.3*  8.2*         Recent Labs      01/18/17   1709   BNPBTYPENAT  2349*     Recent Labs      01/18/17 1705 01/18/17 1709 01/18/17   2302   TROPONINI  <0.01   --   <0.01   BNPBTYPENAT   --   2349*   --              Medical Decision Making, by Problem:  Active Hospital Problems    Diagnosis   • *Acute exacerbation of CHF (congestive heart failure) (CMS-HCC) [I50.9]  Non compliance  Partly from social issue   • Hypoxia and lactic acidosis [R09.02]   • Normocytic anemia [D64.9]   • IDDM (insulin dependent diabetes mellitus) (CMS-HCC) [E11.9, Z79.4]   • S/P CABG (coronary artery bypass graft) [Z95.1]   • Pleural effusion [J90]  Could be post CABG   Not much edema    Plan:  Try simplifying his meds  Will d/c clopidogrel  Prob should be on a little lower dose carvedilol  Increase ACEI  Switch from rosuvastatin to atorvastatin  Try switching to oral furosemide  No need to repeat ECHO  Social service consult?      Core Measures

## 2017-01-19 NOTE — H&P
[facility] [place] [facility] Admitting History and Physical    Name Abner Torres     1960   Age/Sex 56 y.o. male   MRN [id number]   Code Status FULL     After 5PM or if no immediate response to page, please call for cross-coverage  Attending/Team: Dr Champion / Naldo Call [phone] to page   1st Call - Day Intern (R1):   Dr Olguin 2nd Call - Day Sr. Resident (R2/R3):   Dr Ulloa       Chief Complaint:  Hacking and coughing x 1 day    HPI:  A 57 yo M with CAD, Ventricular tachycardia sp 4 vsl CABG 10/8/2016, recurrent bilateral pleural effusions with multiple thoracocentesis sp left pleurodesis on 16, IDDM, Hx of GI bleed in Oct, 2016, provoked DVT with IVC filter in situ, History of recurrent pneumonias came into ER due to cough and increased SOB.     He has been having shortness of breath since Oct last year when he was admitted for pneumonia and DKA. Found to have VT, went through card cath, got 4 vsl CABG, Staph aureus bacteremia, GI bleed Hb 7, received 1U PRBC, had left lower leg DVT with IVC filter. Also got left lung thoracocentesis for pleural effusion at that time.     He was discharge to [facility] on 11/10/16 and got readmitted on 16 for pneumonia with possible septic emboli (TROY neg for endocarditis). He received bilateral thoracocentesis for his recurrent pleural effusions and Thoracic surgery (Dr Ganser) was consulted for rapidly re accumulating pleural effusion. Left thoracoscopy and left pleurodesis was done, chest tube was placed for right pleural effusion. After discharged from hospital that time, while he was in rehab, he got another right thoracocentesis (2L out). Plan was mentioned in the note that he is to receive right pleurodesis if pleural effusion recurs again.    After discharge from last rehab on 16, he was given 9 prescriptions and he was supposed to be on home O2. Patient did not have any money to get medications and did not get O2 because he has no  electricity at home. He was not taking any medications for the past 2 weeks. He noticed increased SOB and leg swelling, could not lie flat, slept with 2 pilllows, did not measure his weight, said to be compliant with low salt diet. Can walk 0.5 to 1 mile at baseline, now can only walk 500 yards (0.28 mile) before taking a rest.   He also started coughing last night, dry cough along with sore throat, runny nose and mild myalgia, started to have mild chest pain due to coughing. Chest pain appears to be musculoskeletal, dull, with no radiation and non exertional. He does not have any fever or chills, denies any urinary symptoms. He received flu shot for this year. Denies any sick contact.     At ED, /85, HR 97, RR 18, afebrile, O2 84% on RA >>  >90% on  3L  Received aspirin 325mg, IV lasix 40 mg for CHF and chest pain,  IV Unasyn 3G, oral azithromycin 500 mg for lactic acidosis with possible infection.     Review of Systems   Constitutional: Positive for malaise/fatigue. Negative for fever and chills.   HENT: Positive for sore throat. Negative for congestion.         Runny nose    Eyes: Negative for blurred vision, double vision and photophobia.   Respiratory: Positive for cough and shortness of breath. Negative for hemoptysis and sputum production.    Cardiovascular: Positive for chest pain, orthopnea and leg swelling. Negative for palpitations.   Gastrointestinal: Negative for heartburn, nausea, vomiting, abdominal pain, diarrhea, constipation and blood in stool.   Genitourinary: Negative for dysuria, urgency, frequency and hematuria.   Musculoskeletal: Positive for myalgias.   Skin: Negative.    Neurological: Negative for sensory change, speech change, focal weakness and headaches.   Psychiatric/Behavioral: Negative.              Past Medical History:   Past Medical History   Diagnosis Date   • Diabetes    • Hypertension    • CHF (congestive heart failure) (CMS-HCC)        Past Surgical History:  Past Surgical  History   Procedure Laterality Date   • Incision and drainage general  5/1/2013     Performed by Herbert Serrano M.D. at SURGERY [facility]   • Debridement  5/1/2013     Performed by Herbert Serrano M.D. at SUR[facility]ERY [facility]   • Debridement  5/22/2013     Performed by Herbert Serrano M.D. at [facility]   • Multiple coronary artery bypass endo vein harvest  10/18/2016     Procedure: MULTIPLE CORONARY ARTERY BYPASS ENDO VEIN HARVEST X4 WITH TROY;  Surgeon: Keith Rojas M.D.;  Location: SURGERY [facility];  Service:    • Thoracoscopy Left 12/5/2016     Procedure: THORACOSCOPY  WITH PLEURODESIS;  Surgeon: John H Ganser, M.D.;  Location: SURGERY [facility];  Service:    • Chest tube insertion Right 12/5/2016     Procedure: CHEST TUBE INSERTION;  Surgeon: John H Ganser, M.D.;  Location: [facility];  Service:    • Other cardiac surgery  10/2016       Current Outpatient Medications:  Home Medications     Reviewed by Ramon Augustin (Pharmacy Tech) on 01/18/17 at 1837  Med List Status: Complete    Medication Last Dose Status          Patient Matthew Taking any Medications                        Medication Allergy/Sensitivities:  No Known Allergies      Family History:  Family History   Problem Relation Age of Onset   • Diabetes Sister    • Diabetes Brother    • Heart Disease Father        Social History:  Social History     Social History   • Marital Status:      Spouse Name: N/A   • Number of Children: N/A   • Years of Education: N/A     Occupational History   • Not on file.     Social History Main Topics   • Smoking status: Never Smoker    • Smokeless tobacco: Not on file   • Alcohol Use: No   • Drug Use: No   • Sexual Activity: Not on file     Other Topics Concern   • Not on file     Social History Narrative     Living situation: by self  PCP : supposed to go to PCP appointment, got readmitted, not establish yet      Physical Exam     Filed Vitals:     17 2130 17 2324 17 0000   BP:  145/87  137/90   Pulse: 77 86 95 77   Temp:  36.7 °C (98 °F)  36.3 °C (97.3 °F)   Resp:    Height:       Weight:  91.1 kg (200 lb 13.4 oz)     SpO2: 96% 98% 96% 95%     Body mass index is 27.23 kg/(m^2).  /90 mmHg  Pulse 77  Temp(Src) 36.3 °C (97.3 °F)  Resp 18  Ht 1.829 m (6')  Wt 91.1 kg (200 lb 13.4 oz)  BMI 27.23 kg/m2  SpO2 95%  O2 therapy: Pulse Oximetry: 95 %, O2 (LPM): 3, O2 Delivery: Nasal Cannula    Physical Exam   Constitutional: He is well-developed, well-nourished, and in no distress.   Appears tired, short of breath   HENT:   Head: Normocephalic and atraumatic.   Eyes: EOM are normal. Pupils are equal, round, and reactive to light.   Neck: Normal range of motion. Neck supple. No JVD present.   Cardiovascular: Normal rate and regular rhythm.  Exam reveals no gallop and no friction rub.    No murmur heard.  Pulmonary/Chest: He is in respiratory distress. He has wheezes. He has rales.   Crackles up to mid lungs, expiratory wheezing bilaterally   Abdominal: Soft. Bowel sounds are normal. He exhibits no distension and no mass. There is no tenderness. There is no rebound and no guarding.   Musculoskeletal:   2+ bilateral edema   Skin: Skin is dry. There is pallor.   Psychiatric: Mood and affect normal.             Data Review       Old Records Request:   Deferred  Current Records review and summary: Completed    Lab Data Review:  Recent Results (from the past 24 hour(s))   EKG (ER)    Collection Time: 17  4:45 PM   Result Value Ref Range    Report       [facility] Emergency Dept.    Test Date:  2017  Pt Name:    BROOK FERREIRA                  Department: ER  MRN:        [id number]                      Room:  Gender:                                 Technician: [id number]  :        1960                   Requested By:ER TRIAGE PROTOCOL  Order #:    [id number]                    Lucila MD:  SORIN CROW MD    Measurements  Intervals                                Axis  Rate:       86                           P:          80  MA:         168                          QRS:        -48  QRSD:       84                           T:          135  QT:         416  QTc:        498    Interpretive Statements  SINUS RHYTHM  PROBABLE LEFT ATRIAL ABNORMALITY  LEFT ANTERIOR FASCICULAR BLOCK  CONSIDER ANTEROSEPTAL INFARCT  ABNORMAL T, CONSIDER ISCHEMIA, LATERAL LEADS  BORDERLINE PROLONGED QT INTERVAL  BASELINE WANDER IN LEAD(S) V6  Compared to ECG 11/30/2016 11:42:50  Left anterior fascicular block now present  Myocardial i nfarct finding now present  Possible ischemia now present  T-wave abnormality still present    Electronically Signed On 1- 19:09:59 PST by SORIN CROW MD     Lactic acid (lactate)    Collection Time: 01/18/17  5:05 PM   Result Value Ref Range    Lactic Acid 2.9 (H) 0.5 - 2.0 mmol/L   CBC WITH DIFFERENTIAL    Collection Time: 01/18/17  5:05 PM   Result Value Ref Range    WBC 6.8 4.8 - 10.8 K/uL    RBC 4.41 (L) 4.70 - 6.10 M/uL    Hemoglobin 11.6 (L) 14.0 - 18.0 g/dL    Hematocrit 38.6 (L) 42.0 - 52.0 %    MCV 87.5 81.4 - 97.8 fL    MCH 26.3 (L) 27.0 - 33.0 pg    MCHC 30.1 (L) 33.7 - 35.3 g/dL    RDW 54.8 (H) 35.9 - 50.0 fL    Platelet Count 184 164 - 446 K/uL    MPV 11.0 9.0 - 12.9 fL    Nucleated RBC 0.00 /100 WBC    NRBC (Absolute) 0.00 K/uL    Neutrophils-Polys 65.00 44.00 - 72.00 %    Lymphocytes 20.00 (L) 22.00 - 41.00 %    Monocytes 5.00 0.00 - 13.40 %    Eosinophils 0.00 0.00 - 6.90 %    Basophils 0.00 0.00 - 1.80 %    Neutrophils (Absolute) 5.10 1.82 - 7.42 K/uL    Lymphs (Absolute) 1.36 1.00 - 4.80 K/uL    Monos (Absolute) 0.34 0.00 - 0.85 K/uL    Eos (Absolute) 0.00 0.00 - 0.51 K/uL    Baso (Absolute) 0.00 0.00 - 0.12 K/uL   COMP METABOLIC PANEL    Collection Time: 01/18/17  5:05 PM   Result Value Ref Range    Sodium 136 135 - 145 mmol/L    Potassium 4.0 3.6 - 5.5 mmol/L     Chloride 108 96 - 112 mmol/L    Co2 19 (L) 20 - 33 mmol/L    Anion Gap 9.0 0.0 - 11.9    Glucose 556 (HH) 65 - 99 mg/dL    Bun 25 (H) 8 - 22 mg/dL    Creatinine 0.85 0.50 - 1.40 mg/dL    Calcium 8.3 (L) 8.5 - 10.5 mg/dL    AST(SGOT) 20 12 - 45 U/L    ALT(SGPT) 44 2 - 50 U/L    Alkaline Phosphatase 139 (H) 30 - 99 U/L    Total Bilirubin 0.7 0.1 - 1.5 mg/dL    Albumin 3.0 (L) 3.2 - 4.9 g/dL    Total Protein 6.6 6.0 - 8.2 g/dL    Globulin 3.6 (H) 1.9 - 3.5 g/dL    A-G Ratio 0.8 g/dL   DIFFERENTIAL MANUAL    Collection Time: 01/18/17  5:05 PM   Result Value Ref Range    Bands-Stabs 10.00 0.00 - 10.00 %    Manual Diff Status PERFORMED    PERIPHERAL SMEAR REVIEW    Collection Time: 01/18/17  5:05 PM   Result Value Ref Range    Peripheral Smear Review see below    MORPHOLOGY    Collection Time: 01/18/17  5:05 PM   Result Value Ref Range    RBC Morphology Present     Polychromia 1+     Poikilocytosis 1+     Ovalocytes 1+    ESTIMATED GFR    Collection Time: 01/18/17  5:05 PM   Result Value Ref Range    GFR If African American >60 >60 mL/min/1.73 m 2    GFR If Non African American >60 >60 mL/min/1.73 m 2   TROPONIN    Collection Time: 01/18/17  5:05 PM   Result Value Ref Range    Troponin I <0.01 0.00 - 0.04 ng/mL   MAGNESIUM    Collection Time: 01/18/17  5:05 PM   Result Value Ref Range    Magnesium 1.9 1.5 - 2.5 mg/dL   TSH WITH REFLEX TO FT4    Collection Time: 01/18/17  5:05 PM   Result Value Ref Range    TSH 1.440 0.300 - 3.700 uIU/mL   BNP    Collection Time: 01/18/17  5:09 PM   Result Value Ref Range    B Natriuretic Peptide 2349 (H) 0 - 100 pg/mL   Lactic acid (lactate)    Collection Time: 01/18/17  6:42 PM   Result Value Ref Range    Lactic Acid 2.1 (H) 0.5 - 2.0 mmol/L   EKG (ER)    Collection Time: 01/18/17  7:15 PM   Result Value Ref Range    Report       [facility] Emergency Dept.    Test Date:  2017-01-18  Pt Name:    BROOK FERREIRA                  Department: ER  MRN:        [id number]                       Room:        20  Gender:     M                            Technician: [id number]  :        1960                   Requested By:SORIN CROW  Order #:    [id number]                    Reading MD:    Measurements  Intervals                                Axis  Rate:       83                           P:          65  AZ:         168                          QRS:        -38  QRSD:       92                           T:          143  QT:         424  QTc:        499    Interpretive Statements  SINUS RHYTHM  LEFT AXIS DEVIATION  CONSIDER ANTEROSEPTAL INFARCT  ABNORMAL T, CONSIDER ISCHEMIA, LATERAL LEADS  BORDERLINE PROLONGED QT INTERVAL  Compared to ECG 2017 16:45:22  Left-axis deviation now present  Left anterior fascicular block no longer present  Myocardial infarct finding still present  T-wave abnormality  still present  Possible ischemia still present     URINALYSIS    Collection Time: 17  7:52 PM   Result Value Ref Range    Micro Urine Req Microscopic     Color Lt. Yellow     Character Clear     Specific Gravity 1.019 <1.035    Ph 6.5 5.0-8.0    Glucose >1000 (A) Negative mg/dL    Ketones Negative Negative mg/dL    Protein 200 (A) Negative mg/dL    Bilirubin Negative Negative    Nitrite Negative Negative    Leukocyte Esterase Negative Negative    Occult Blood Trace (A) Negative   URINE MICROSCOPIC (W/UA)    Collection Time: 17  7:52 PM   Result Value Ref Range    WBC 0-2 (A) /hpf    RBC 5-10 (A) /hpf   TROPONIN    Collection Time: 17 11:02 PM   Result Value Ref Range    Troponin I <0.01 0.00 - 0.04 ng/mL   EKG (ER)    Collection Time: 17 11:55 PM   Result Value Ref Range    Report       [facility]    Test Date:  2017  Pt Name:    BROOK FERREIRA                  Department: ER  MRN:        [id number]                      Room:       T734  Gender:     M                            Technician: MM  :        1960                   Requested By:SORIN BARLOW  TRISTIN  Order #:    [id number]                    Reading MD:    Measurements  Intervals                                Axis  Rate:       71                           P:          65  ME:         172                          QRS:        -33  QRSD:       92                           T:          148  QT:         456  QTc:        496    Interpretive Statements  SINUS RHYTHM  LEFT AXIS DEVIATION  ABNORMAL T, CONSIDER ISCHEMIA, LATERAL LEADS  BORDERLINE PROLONGED QT INTERVAL  BASELINE WANDER IN LEAD(S) V1  Compared to ECG 01/18/2017 19:15:19  Myocardial infarct finding no longer present  T-wave abnormality still present  Possible ischemia still present     ACCU-CHEK GLUCOSE    Collection Time: 01/19/17 12:09 AM   Result Value Ref Range    Glucose - Accu-Ck 444 (HH) 65 - 99 mg/dL       Imaging/Procedures Review:    ndependant Imaging Review: Completed  DX-CHEST-PORTABLE (1 VIEW)   Final Result         1. Mild interstitial pulmonary edema.   2. Small to moderate bilateral pleural effusions, left more than right, with adjacent opacities, atelectasis or consolidation.           EKG:   EKG Independant Review: Completed  QTc:498, HR: 86, Normal Sinus Rhythm, T inversions in V2, V4-6, aVL     Records reviewed and summarized in current documentation             Assessment/Plan     * Acute exacerbation of CHF (congestive heart failure) (CMS-[facility])  Assessment & Plan  CHF exacerbation due to inaccessibility to medications, underlying CAD with 4 vsl CABG in Oct, 2016  Crackles to mid lower lungs with wheezing, bilateral 2+ edema  Last EF (12/23/16): 35% Grade 3 diastolic dysfunction (restrictive pattern)  BNP 2349 on admit (baseline 300s 1/5/16), CXR showed increased pulmonary edema with mild - mod bilateral pleural effusions. Trop x 2 neg, EKG T inversions in V2, V4-6, aVL, not changed in subsequent EKG.     - Cardiology consulted by ER, resume medications, diuresis, Echocardiogram, Lexiscan.   - hold echo order for now since he  recently got one in December and patient is currently in acute exacerbation. NPO with meds after midnight for possible lexiscan tomorrow.   - diuresis with lasix, currently volume overloaded, monitor BUN, Cr due to G 3 diastolic dysfunction with restrictive pattern and BUN 25 on admit.   - resumed aspirin, plavix, coreg, lisinopril. Hold spironolactone, consider resuming after stabilization  - consider thoracic surgery consult for right pleurodesis if pleural effusion does not respond to diuresis.  - monitor H & H and GI bleed on double platelet therapy due to Hx of GI bleed and blood transfusion in Oct admission. Aspirin was held by GI after that and patient has been on PPI. Continue omeprazole.  - Per patient, he is supposed to meet with [facility] for [facility] prescription today or tomorrow for his prescriptions so that they will be ready when he get discharged this time.            Hypoxia and lactic acidosis  Assessment & Plan  - supposed to be on home O2 which he could not get. He will get oxygen concentrator this time so that he does not need to replete again.   - lactic acidosis: HCO3 19, LA 2.9 trended down to 2.1.   - received Unasyn and azithro, blood Cx, UA (neg for infection), Ur Cx at ER.   - will hold Ab, WBC normal, afebrile, dry cough with viral URI symptoms. His hypoxia is more likely from pulmonary edema secondary to CHF leading decreased tissue perfusion and lactic acidosis. Follow lactic acid and monitor response. (did have history of recurrent pneumonias)    IDDM (insulin dependent diabetes mellitus) (CMS-[facility])  Assessment & Plan  - on metformin 500 bid, glargine 10 U QD  - last Hb A1 C 6.1 on 12/24/16, blood glucose 556 on admit due to lack of medications  - hold metformin, continue glargine 10 U QD, ISS, hypoglycemic protocol    Normocytic anemia  Assessment & Plan  Iron deficiency + anemia of chronic disease. (Low iron, low TIBC, normal saturation, normal B12, folate, Hx of GI Bleed)  - will give  Fe supplements.        Anticipated Hospital stay:  >2 midnights        Quality Measures  EKG reviewed, Medications reviewed and Labs reviewed  Yeager catheter: No Yeager      DVT Prophylaxis: Enoxaparin (Lovenox)

## 2017-01-19 NOTE — ASSESSMENT & PLAN NOTE
Patient presented with SOB and orthopnea, associated with productive cough   He has history of ischemic cardiomyopathy s/p 4 vsl CABG in 10/2016 with last echo showing EF of 35% Grade 3 diastolic dysfunction (12/2016)  Exam revealed bilateral leg edema with bilateral crackles up to on third of lungs on admission  Labs showed BNP 2349, with negative trop x3 and non specific T wave changes on EKG (1/18)  CXR showed increased pulmonary edema with mild - mod bilateral pleural effusions (1/18)  Suspect CHF exacerbation due to inaccessibility to medications secondary to financial hardships   Patient was restarted on aspirin, plavix, coreg, lisinopril on admission  Cardiologist was consulted and recommended against repeating echo or doing stress test  Discontinue plavix (per cards rec's) to reduce cost of medications  Plan  - Continue aspirin 81 MG and lisinopril 20 MG  - Continue coreg to 6.25 MG BID  - Continue atorvastatin 40 MG   - Continue IV lasix 40 MG BID  - Continue MATHEW hose  - F/U in 1 week after discharge per cardio

## 2017-01-19 NOTE — ASSESSMENT & PLAN NOTE
On metformin 500 bid, glargine 10 U QD at home  Last Hb A1 C 6.1 (12/2016), blood glucose 556 on admission due medication non compliance   Blood sugar running around 200s  Plan  - Continue insulin glargine 17 units   - Increase ISS and continue hypoglycemia protocol   - Consider restarting metformin as patient will not be able to afford insulin after discharge

## 2017-01-19 NOTE — SENIOR ADMIT NOTE
Senior admit note    HPI:    Abner Torres 56 y.o. male with PMhx presented to the ED with complaint of cough that started last night. Its not associated with sputum production. Cough is associated with runny nose and sore throat. All started last night. Patient reported that he received flu shot this year. Patient denies history of sick contact. He experienced chest pain with cough but not at rest. Patient feels shortness of breath and he cannot lay flat on bed. He was prescribed home oxygen but he didn't start using it due to financial problems. He denies any other active complaints. Patient recently discharged from the rehab after cardiac surgery and they gave him 9 prescription to fill but he didn't get any medication due to financial issues.     In ED patient was treated with Unasyn and Azithromycin.    Physical Exam:    Filed Vitals:    01/18/17 1640 01/18/17 1650 01/18/17 1652 01/18/17 1800   BP: 129/85   150/91   Pulse: 97   85   Temp: 36.2 °C (97.2 °F)      Resp: 18 26  18   Height: 1.829 m (6')      Weight:   85.276 kg (188 lb)    SpO2: 97%   93%     General: Not in acute distress  HEENT: NCAT  Resp:  B/L decrease breath sounds at bases mild wheezes  CVS: Regular rate and rhythm, 2+ lower extremity edema   Abdomen: Soft non tender +BS  Neuro: No focal deficit, CN II-XII grossly intact      Assessment and plan:    Congestive heart failure exacerbation ( Per last ECHO Left ventricular ejection fraction is visually estimated to be 35%. Grade III diastolic dysfunction)   Patient is presented with dry cough and shortness of breath. Initially his oxygen saturation was low now he is maintaining oxygen saturation of  97% on 3 L. EKG showed new T wave inversion which is new and troponin is less than 0.01. Cardiology was consulted by ED and Dr. Suarez recommended diuresis and restart discharge meds trend EKG and Trop. BNP is significantly elevated and its 2349 (baselien below 500). Will provide him diuresis  with Lasix 40mg IV BID and continue his home medications. We will not continue antibiotic at this time as patient symptoms are most likely secondary to CHF exacerbation.     Code Status: Full    DVT Prophylaxis: Lovenox

## 2017-01-19 NOTE — PROGRESS NOTES
Report received on patient, patient arrived to floor and assumed care. Patient on the monitor with vitals taken and assessment complete. Oriented to room and floor and use of call light. Updated on plan of care. Patient educated to call for assistance before ambulating.

## 2017-01-19 NOTE — ED NOTES
Patient sating 84% RA with no improvement, oxygen placed on patient 3 liters NC with current sat of 97%. Patient taken back for an EKG.

## 2017-01-19 NOTE — PROGRESS NOTES
Report received, assumed care, assessment done. Pt aware of plan of care. Will monitor. Denies pain. Pt unsteady on feet, back to bed from chair. Calls for assist. Pt concerned about paying for medication.

## 2017-01-19 NOTE — ASSESSMENT & PLAN NOTE
- supposed to be on home O2 which he could not get. He will get oxygen concentrator this time so that he does not need to replete again.   - lactic acidosis: HCO3 19, LA 2.9 trended down to 2.1.   - received Unasyn and azithro, blood Cx, UA (neg for infection), Ur Cx at ER.   - will hold Ab, WBC normal, afebrile, dry cough with viral URI symptoms. His hypoxia is more likely from pulmonary edema secondary to CHF leading decreased tissue perfusion and lactic acidosis. Follow lactic acid and monitor. (did have history of recurrent pneumonias)

## 2017-01-19 NOTE — CARE PLAN
Problem: Communication  Goal: The ability to communicate needs accurately and effectively will improve  Intervention: [place] patient and significant other/support system to call light to alert staff of needs  Patient educated on use of call light to alert staff of needs.      Problem: Safety  Goal: Will remain free from falls  Intervention: Implement fall precautions  Bed alarm on and fall precautions in place.

## 2017-01-19 NOTE — ED PROVIDER NOTES
"ED Provider Note    CHIEF COMPLAINT  Chief Complaint   Patient presents with   • Dizziness     x2day   • Chest Pain     Pt is vague historian    HPI  Abner Torres is a 56 y.o. male with h/o CADz s/p 4V CABG last month, HTN, and DM who presents complaining of cough and SOB.    Patient states since his surgery he has had an intermittent, dry cough and shortness of breath/dyspnea on exertion. He was discharged last week from rehab and never obtained the discharge medications.     Patient also describes \"chest pounding\" and denies any real pain or discomfort. This woke him up at 2 AM today.    Patient has noted edema in his bilateral extremities, a dry throat, runny nose, and shortness of breath, mainly with exertion. He admits to orthopnea and PND.    Patient denies fever, chills, calf pain, hemoptysis, urinary symptoms.    No aspirin therapy today      ALLERGIES  No Known Allergies    CURRENT MEDICATIONS  Home Medications     Reviewed by Ramon Augustin (Pharmacy Tech) on 01/18/17 at 1837  Med List Status: Complete    Medication Last Dose Status          Patient Matthew Taking any Medications                        PAST MEDICAL HISTORY   has a past medical history of Diabetes and Hypertension.    SURGICAL HISTORY   has past surgical history that includes incision and drainage general (5/1/2013); debridement (5/1/2013); debridement (5/22/2013); multiple coronary artery bypass endo vein harvest (10/18/2016); thoracoscopy (Left, 12/5/2016); chest tube insertion (Right, 12/5/2016); and other cardiac surgery (10/2016).    SOCIAL HISTORY  Social History     Social History Main Topics   • Smoking status: Never Smoker    • Smokeless tobacco: Not on file   • Alcohol Use: No   • Drug Use: No   • Sexual Activity: Not on file       REVIEW OF SYSTEMS  See HPI for further details.  All other systems are negative except as above in HPI.    PHYSICAL EXAM  VITAL SIGNS: /85 mmHg  Pulse 97  Temp(Src) 36.2 °C (97.2 °F)  " Resp 26  Ht 1.829 m (6')  Wt 85.276 kg (188 lb)  BMI 25.49 kg/m2  SpO2 97%    General:  WDWN, ill appearing; A+Ox3; V/S as above; afebrile, not hypoxic or tachycardic  Skin: warm and dry; pale color; no rash  HEENT: NCAT; EOMs intact; no scleral icterus; dry lips  Neck: FROM; +JVD  Cardiovascular: Regular heart rate and rhythm.  No murmurs, rubs, or gallops; pulses 2+ bilaterally radially and DP areas  Lungs: Crackles at the bases bilaterally with air movement bilaterally.  No wheezes or rhonchi.   Abdomen: BS present; soft; NTND; no rebound, guarding, or rigidity.  No organomegaly or pulsatile mass  Extremities: FITZGERALD x 4; no e/o trauma; neg Ben's; +edema bilaterally  Neurologic: CNs III-XII grossly intact; speech clear; distal sensation intact; strength 5/5 UE/LEs  Psychiatric: Appropriate affect, normal mood    LABS  Results for orders placed or performed during the hospital encounter of 01/18/17   Lactic acid (lactate)   Result Value Ref Range    Lactic Acid 2.9 (H) 0.5 - 2.0 mmol/L   CBC WITH DIFFERENTIAL   Result Value Ref Range    WBC 6.8 4.8 - 10.8 K/uL    RBC 4.41 (L) 4.70 - 6.10 M/uL    Hemoglobin 11.6 (L) 14.0 - 18.0 g/dL    Hematocrit 38.6 (L) 42.0 - 52.0 %    MCV 87.5 81.4 - 97.8 fL    MCH 26.3 (L) 27.0 - 33.0 pg    MCHC 30.1 (L) 33.7 - 35.3 g/dL    RDW 54.8 (H) 35.9 - 50.0 fL    Platelet Count 184 164 - 446 K/uL    MPV 11.0 9.0 - 12.9 fL    Nucleated RBC 0.00 /100 WBC    NRBC (Absolute) 0.00 K/uL    Neutrophils-Polys 65.00 44.00 - 72.00 %    Lymphocytes 20.00 (L) 22.00 - 41.00 %    Monocytes 5.00 0.00 - 13.40 %    Eosinophils 0.00 0.00 - 6.90 %    Basophils 0.00 0.00 - 1.80 %    Neutrophils (Absolute) 5.10 1.82 - 7.42 K/uL    Lymphs (Absolute) 1.36 1.00 - 4.80 K/uL    Monos (Absolute) 0.34 0.00 - 0.85 K/uL    Eos (Absolute) 0.00 0.00 - 0.51 K/uL    Baso (Absolute) 0.00 0.00 - 0.12 K/uL   COMP METABOLIC PANEL   Result Value Ref Range    Sodium 136 135 - 145 mmol/L    Potassium 4.0 3.6 - 5.5 mmol/L     Chloride 108 96 - 112 mmol/L    Co2 19 (L) 20 - 33 mmol/L    Anion Gap 9.0 0.0 - 11.9    Glucose 556 (HH) 65 - 99 mg/dL    Bun 25 (H) 8 - 22 mg/dL    Creatinine 0.85 0.50 - 1.40 mg/dL    Calcium 8.3 (L) 8.5 - 10.5 mg/dL    AST(SGOT) 20 12 - 45 U/L    ALT(SGPT) 44 2 - 50 U/L    Alkaline Phosphatase 139 (H) 30 - 99 U/L    Total Bilirubin 0.7 0.1 - 1.5 mg/dL    Albumin 3.0 (L) 3.2 - 4.9 g/dL    Total Protein 6.6 6.0 - 8.2 g/dL    Globulin 3.6 (H) 1.9 - 3.5 g/dL    A-G Ratio 0.8 g/dL   BNP   Result Value Ref Range    B Natriuretic Peptide 2349 (H) 0 - 100 pg/mL   DIFFERENTIAL MANUAL   Result Value Ref Range    Bands-Stabs 10.00 0.00 - 10.00 %    Manual Diff Status PERFORMED    PERIPHERAL SMEAR REVIEW   Result Value Ref Range    Peripheral Smear Review see below    MORPHOLOGY   Result Value Ref Range    RBC Morphology Present     Polychromia 1+     Poikilocytosis 1+     Ovalocytes 1+    ESTIMATED GFR   Result Value Ref Range    GFR If African American >60 >60 mL/min/1.73 m 2    GFR If Non African American >60 >60 mL/min/1.73 m 2   EKG (ER)   Result Value Ref Range    Report       [facility] Emergency Dept.    Test Date:  2017  Pt Name:    BROOK FERREIRA                  Department: ER  MRN:        [id number]                      Room:  Gender:     M                            Technician: [id number]  :        1960                   Requested By:ER TRIAGE PROTOCOL  Order #:    [id number]                    Reading MD:    Measurements  Intervals                                Axis  Rate:       86                           P:          80  VT:         168                          QRS:        -48  QRSD:       84                           T:          135  QT:         416  QTc:        498    Interpretive Statements  SINUS RHYTHM  PROBABLE LEFT ATRIAL ABNORMALITY  LEFT ANTERIOR FASCICULAR BLOCK  CONSIDER ANTEROSEPTAL INFARCT  ABNORMAL T, CONSIDER ISCHEMIA, LATERAL LEADS  BORDERLINE PROLONGED QT  "INTERVAL  BASELINE WANDER IN LEAD(S) V6  Compared to ECG 11/30/2016 11:42:50  Left anterior fascicular block now present  Myocardial infarct finding now pr esent  Possible ischemia now present  T-wave abnormality still present         EKG  12 Lead EKG obtained and interpreted by me to show:  Rhythm: Sinus rhythm   Rate: 86  Intervals:   MO: 168  QRS: 84   QTC: 498   All intervals are within normal limits  No ectopy  Normal axis  No ST changes  T-wave inversions in V4 through V6  Clinical Impression: sinus rhythm with t wave inversions laterally  Compared to previous EKG dated 11/30/16 which shows no t wave inversions  EKG has been confirmed in [facility]     IMAGING  DX-CHEST-PORTABLE (1 VIEW)   Final Result         1. Mild interstitial pulmonary edema.   2. Small to moderate bilateral pleural effusions, left more than right, with adjacent opacities, atelectasis or consolidation.          MEDICAL RECORD  I have reviewed patient's medical record and pertinent results are listed below.      COURSE & MEDICAL DECISION MAKING  I have reviewed any medical record information, laboratory studies and radiographic results as noted.    Abner Torres is a 56 y.o. male who presents complaining of cough and chest \"pounding\" in setting of noncompliance/inability to fill Rx from recent discharge following cardiac bypass surgery.    Triage EKG noted to have t wave inversions, changed from prior in November    CXR demonstrates mild pulmonary edema    BNP is elevated to 2349  Lactic acid is elevated to 2.9, repeat is 2.1--possible PNA but doubtful given normal WBC and no sputum or fever.  Will give first dose of abx here, Unasyn and Zithromax, per sepsis protocol.  Normal saline 30 mL/kg bolus per sepsis protocol is held due to patient's pulmonary edema. Glucose is 556 with CO2 of 19.  Creatinine and K normal.     Pt was re-evaluated at 6:27 PM  Paging [facility] for admission  Lasix and ASA ordered    7:14 PM  Discussed with " cardiology/Britta who agrees to see pt    Cardiology agrees with Lasix and ASA    The total critical care time on this patient is 40 minutes, resuscitating patient, speaking with admitting physician, and deciphering test results. This 40 minutes is exclusive of separately billable procedures.      FINAL IMPRESSION  1. Acute congestive heart failure, unspecified congestive heart failure type (CMS-HCC)    2. Hyperglycemia          Electronically signed by: Gayathri Simpson, 1/18/2017 5:13 PM

## 2017-01-19 NOTE — ED NOTES
Chief Complaint   Patient presents with   • Dizziness     x2day   • Chest Pain     Pt from ekg to triage,pt had open heart surgery 10/2016. Dizziness x2days, pt appears pale.   Respiration  28, was placed on oxygen from triage.   Protocol initiated.

## 2017-01-19 NOTE — CONSULTS
Cardiology Consult Note:    Cherie Suarez  Date & Time note created:    1/18/2017   7:26 PM     Referring MD:  Dr. Torres/ER    Patient ID:   Name:             Abner Torres   YOB: 1960  Age:                 56 y.o.  male   MRN:               [id number]                                                             Chief Complaint:      Recurrent CHF    History of Present Illness:    55 y/o male with 4v CABG (10/2016 The patient underwent 4-vessel coronary artery  bypass grafting.  The arteries involved the LAD diagonal, major obtuse and  right coronary arteries) with several trips to ER,rehab with pleural effusion;  and suspect noncompliance on report; He has not had his meds for days and w/o his disability check, he did not get his meds. Pleuritic CP, SOB and LE edema started at least 2 days ago;   Current c/o increased dyspnea with BNP>2300, but neg Trop; EKG showed t-wave inversion, new from recent hospital EKG  H/o DM, HTN and blood sugar 500+  Denies smoking, EtOH; Strong Fhx CAD and high cholesterol;    Review of Systems:      Constitutional: Denies fevers, Not sure if weight changes  Eyes: Denies changes in vision, no eye pain  Ears/Nose/Throat/Mouth: Denies nasal congestion or sore throat   Cardiovascular: - chest pain, - palpitations   Respiratory: + shortness of breath , Denies cough  Gastrointestinal/Hepatic: Denies abdominal pain, nausea, vomiting, diarrhea, constipation or GI bleeding   Genitourinary: Denies dysuria or frequency  Musculoskeletal/Rheum: Denies  joint pain and swelling   Skin: Denies rash  Neurological: Denies headache, confusion, memory loss or focal weakness/parasthesias  Psychiatric: denies mood disorder   Endocrine: Isi thyroid problems  Heme/Oncology/Lymph Nodes: Denies enlarged lymph nodes, denies brusing or known bleeding disorder  All other systems were reviewed and are negative (AMA/CMS criteria)                Past Medical History:   Past Medical History    Diagnosis Date   • Diabetes    • Hypertension      There are no hospital problems to display for this patient.      Past Surgical History:  Past Surgical History   Procedure Laterality Date   • Incision and drainage general  5/1/2013     Performed by Herbert Serrano M.D. at SURGERY [facility]   • Debridement  5/1/2013     Performed by Herbert Serrano M.D. at SURGERY [facility]   • Debridement  5/22/2013     Performed by Herbert Serrano M.D. at SURGERY [facility]   • Multiple coronary artery bypass endo vein harvest  10/18/2016     Procedure: MULTIPLE CORONARY ARTERY BYPASS ENDO VEIN HARVEST X4 WITH TROY;  Surgeon: Keith Rojas M.D.;  Location: SURGERY [facility];  Service:    • Thoracoscopy Left 12/5/2016     Procedure: THORACOSCOPY  WITH PLEURODESIS;  Surgeon: John H Ganser, M.D.;  Location: SURGERY [facility];  Service:    • Chest tube insertion Right 12/5/2016     Procedure: CHEST TUBE INSERTION;  Surgeon: John H Ganser, M.D.;  Location: SURGERY [facility];  Service:    • Other cardiac surgery  10/2016       Hospital Medications:    Current facility-administered medications:   •  ampicillin/sulbactam (UNASYN) 3 g in  mL IVPB, 3 g, Intravenous, Q6HRS, Gayathri Simpson M.D.  No current outpatient prescriptions on file.    Current Outpatient Medications:    (Not in a hospital admission)    Medication Allergy:  No Known Allergies    Family History:  Family History   Problem Relation Age of Onset   • Diabetes Sister    • Diabetes Brother        Social History:  Social History     Social History   • Marital Status:      Spouse Name: N/A   • Number of Children: N/A   • Years of Education: N/A     Occupational History   • Not on file.     Social History Main Topics   • Smoking status: Never Smoker    • Smokeless tobacco: Not on file   • Alcohol Use: No   • Drug Use: No   • Sexual Activity: Not on file     Other Topics Concern   • Not on file     Social History  Narrative         Physical Exam:  Vitals  Weight/BMI: Body mass index is 25.49 kg/(m^2).  Blood pressure 150/91, pulse 84, temperature 36.2 °C (97.2 °F), resp. rate 11, height 1.829 m (6'), weight 85.276 kg (188 lb), SpO2 97 %.  Filed Vitals:    01/18/17 1650 01/18/17 1652 01/18/17 1800 01/18/17 1900   BP:   150/91    Pulse:   85 84   Temp:       Resp: 26  18 11   Height:       Weight:  85.276 kg (188 lb)     SpO2:   93% 97%     Oxygen Therapy:  Pulse Oximetry: 97 %, O2 (LPM): 3, O2 Delivery: Nasal Cannula  General Appearance:   Well developed, Well nourished, No acute distress, Non-toxic appearance.   HENT:  Normocephalic, Atraumatic, Oropharynx moist mucous membranes, Dentition: , Nose normal.    Eyes:  PERRLA, EOMI, Conjunctiva normal, No discharge.  Neck:  Normal range of motion, No cervical tenderness, Supple, No stridor, no JVD .  No thyromegaly.  No carotid bruit.  Cardiovascular:  Normal heart rate, Normal rhythm,  S1, S2, no S3,  S4; No gallops; No murmurs, No rubs, .   Extremitites with intact distal pulses, no cyanosis, clubbing or edema.  No heaves, thrills, ML thoracotomy scar haeling  Peripheral pulses: carotid 2+, brachial 2+, radial 2+, ulnar 2+, femoral 2+, popliteal 2+, PT 2+, DP 2+;  Lungs:  Respiratory effort is normal. Normal breath sounds, breath sounds clear to auscultation bilaterally,  no rales, mild rhonchi, no wheezing.   Abdomen: Bowel sounds normal, Soft, No tenderness, No guarding, No rebound, No masses, No hepatosplenomegaly.  Skin: Warm, Dry, No erythema, No rash, no induration or crepitus.  Neurologic: Alert & oriented x 3, Normal motor function, Normal sensory function, No focal deficits noted, cranial nerves II through XII are normal,    Psychiatric: Affect normal, Judgment normal, Mood normal.      MDM (Data Review):     Records reviewed and summarized in current documentation    Lab Data Review:  Recent Results (from the past 24 hour(s))   EKG (ER)    Collection Time: 01/18/17   4:45 PM   Result Value Ref Range    Report       [facility] Emergency Dept.    Test Date:  2017  Pt Name:    BROOK FERREIRA                  Department: ER  MRN:        [id number]                      Room:  Gender:     M                            Technician: [id number]  :        1960                   Requested By:ER TRIAGE PROTOCOL  Order #:    [id number]                    Reading MD: SORIN CROW MD    Measurements  Intervals                                Axis  Rate:       86                           P:          80  WY:         168                          QRS:        -48  QRSD:       84                           T:          135  QT:         416  QTc:        498    Interpretive Statements  SINUS RHYTHM  PROBABLE LEFT ATRIAL ABNORMALITY  LEFT ANTERIOR FASCICULAR BLOCK  CONSIDER ANTEROSEPTAL INFARCT  ABNORMAL T, CONSIDER ISCHEMIA, LATERAL LEADS  BORDERLINE PROLONGED QT INTERVAL  BASELINE WANDER IN LEAD(S) V6  Compared to ECG 2016 11:42:50  Left anterior fascicular block now present  Myocardial i nfarct finding now present  Possible ischemia now present  T-wave abnormality still present    Electronically Signed On 2017 19:09:59 PST by SORIN CROW MD     Lactic acid (lactate)    Collection Time: 17  5:05 PM   Result Value Ref Range    Lactic Acid 2.9 (H) 0.5 - 2.0 mmol/L   CBC WITH DIFFERENTIAL    Collection Time: 17  5:05 PM   Result Value Ref Range    WBC 6.8 4.8 - 10.8 K/uL    RBC 4.41 (L) 4.70 - 6.10 M/uL    Hemoglobin 11.6 (L) 14.0 - 18.0 g/dL    Hematocrit 38.6 (L) 42.0 - 52.0 %    MCV 87.5 81.4 - 97.8 fL    MCH 26.3 (L) 27.0 - 33.0 pg    MCHC 30.1 (L) 33.7 - 35.3 g/dL    RDW 54.8 (H) 35.9 - 50.0 fL    Platelet Count 184 164 - 446 K/uL    MPV 11.0 9.0 - 12.9 fL    Nucleated RBC 0.00 /100 WBC    NRBC (Absolute) 0.00 K/uL    Neutrophils-Polys 65.00 44.00 - 72.00 %    Lymphocytes 20.00 (L) 22.00 - 41.00 %    Monocytes 5.00 0.00 - 13.40 %    Eosinophils  0.00 0.00 - 6.90 %    Basophils 0.00 0.00 - 1.80 %    Neutrophils (Absolute) 5.10 1.82 - 7.42 K/uL    Lymphs (Absolute) 1.36 1.00 - 4.80 K/uL    Monos (Absolute) 0.34 0.00 - 0.85 K/uL    Eos (Absolute) 0.00 0.00 - 0.51 K/uL    Baso (Absolute) 0.00 0.00 - 0.12 K/uL   COMP METABOLIC PANEL    Collection Time: 01/18/17  5:05 PM   Result Value Ref Range    Sodium 136 135 - 145 mmol/L    Potassium 4.0 3.6 - 5.5 mmol/L    Chloride 108 96 - 112 mmol/L    Co2 19 (L) 20 - 33 mmol/L    Anion Gap 9.0 0.0 - 11.9    Glucose 556 (HH) 65 - 99 mg/dL    Bun 25 (H) 8 - 22 mg/dL    Creatinine 0.85 0.50 - 1.40 mg/dL    Calcium 8.3 (L) 8.5 - 10.5 mg/dL    AST(SGOT) 20 12 - 45 U/L    ALT(SGPT) 44 2 - 50 U/L    Alkaline Phosphatase 139 (H) 30 - 99 U/L    Total Bilirubin 0.7 0.1 - 1.5 mg/dL    Albumin 3.0 (L) 3.2 - 4.9 g/dL    Total Protein 6.6 6.0 - 8.2 g/dL    Globulin 3.6 (H) 1.9 - 3.5 g/dL    A-G Ratio 0.8 g/dL   DIFFERENTIAL MANUAL    Collection Time: 01/18/17  5:05 PM   Result Value Ref Range    Bands-Stabs 10.00 0.00 - 10.00 %    Manual Diff Status PERFORMED    PERIPHERAL SMEAR REVIEW    Collection Time: 01/18/17  5:05 PM   Result Value Ref Range    Peripheral Smear Review see below    MORPHOLOGY    Collection Time: 01/18/17  5:05 PM   Result Value Ref Range    RBC Morphology Present     Polychromia 1+     Poikilocytosis 1+     Ovalocytes 1+    ESTIMATED GFR    Collection Time: 01/18/17  5:05 PM   Result Value Ref Range    GFR If African American >60 >60 mL/min/1.73 m 2    GFR If Non African American >60 >60 mL/min/1.73 m 2   TROPONIN    Collection Time: 01/18/17  5:05 PM   Result Value Ref Range    Troponin I <0.01 0.00 - 0.04 ng/mL   BNP    Collection Time: 01/18/17  5:09 PM   Result Value Ref Range    B Natriuretic Peptide 2349 (H) 0 - 100 pg/mL   Lactic acid (lactate)    Collection Time: 01/18/17  6:42 PM   Result Value Ref Range    Lactic Acid 2.1 (H) 0.5 - 2.0 mmol/L   EKG (ER)    Collection Time: 01/18/17  7:15 PM   Result  Value Ref Range    Report       [facility] Emergency Dept.    Test Date:  2017  Pt Name:    BROOK FERREIRA                  Department: ER  MRN:        [id number]                      Room:       BL 20  Gender:     M                            Technician: [id number]  :        1960                   Requested By:SORIN CROW  Order #:    [id number]                    Reading MD:    Measurements  Intervals                                Axis  Rate:       83                           P:          65  MT:         168                          QRS:        -38  QRSD:       92                           T:          143  QT:         424  QTc:        499    Interpretive Statements  SINUS RHYTHM  LEFT AXIS DEVIATION  CONSIDER ANTEROSEPTAL INFARCT  ABNORMAL T, CONSIDER ISCHEMIA, LATERAL LEADS  BORDERLINE PROLONGED QT INTERVAL  Compared to ECG 2017 16:45:22  Left-axis deviation now present  Left anterior fascicular block no longer present  Myocardial infarct finding still present  T-wave abnormality  still present  Possible ischemia still present         Imaging/Procedures Review:    Chest Xray:  Reviewed  2016: Echo: CONCLUSIONS  Compared to the images of the prior study done 10/11/16, there has been   a significant decrease in the EF.    1. Left ventricular ejection fraction is visually estimated to be 35%.    Grade III diastolic dysfunction (restrictive pattern).     2. Aortic sclerosis without stenosis.    3. Estimated right ventricular systolic pressure  is 30 mmHg.    4. Right atrial pressure is estimated to be 15 mmHg.    EKG:   As in HPI. See above; New lateral t-inversion    MDM (Assessment and Plan):     CHF, combined S/D dysfxn  CAD with 4v CABG  New abn EKG with Lateral t-wave inversion  Noncompliance  Pleuritic CP and bilateral recurrent  pleural effusion  DM  HTN  Hyperlipidemia    Rec: Diurese; and restart discharge meds; trend EKG and Trop;   Echocardiogram, CXR to r/o  recurrent pleural fluid;, Lexiscan    Will follow  Thx

## 2017-01-19 NOTE — ASSESSMENT & PLAN NOTE
Iron deficiency + anemia of chronic disease. (Low iron, low TIBC, normal saturation, normal B12, folate, Hx of GI Bleed)  Plan  - Continue Fe supplements

## 2017-01-19 NOTE — PROGRESS NOTES
[facility] Internal Medicine Interval Note    Name Abner Torres     1960   Age/Sex 56 y.o. male   MRN [id number]   Code Status Full      After 5PM or if no immediate response to page, please call for cross-coverage  Attending/Team: Jaycee/ JAQUI Hitchcock  Call [phone] to page   1st Call - Day Intern (R1):   Dr. Victor 2nd Call - Day Sr. Resident (R2/R3):   Dr. Ulloa         Chief complaint/ reason for interval visit (Primary Diagnosis)   Dizziness, Cough and SOB x 1 day       ID: Mr. Torres is a 56 year old male with a PMHx of DM2, HTN and CAD s/p CABG in 2016. He presented to the ED with dizziness, cough and SOB x 1 day. He was recently released from rehabilitation center and states he has not taken any of his prescribed medications  including his insulin since his release 2 weeks ago due to lack of money to purchase them and miscommunication regarding what medications he was supposed to continue.     Mr. Torres had no acute events overnight and states that he already feels improved after receiving lasix. He still has some complaints of SOB this morning but denies any chest pain or palpitations. He still has some dizziness upon standing but this to has improved since admission.    Interval Problem Daily Status Update    Active Hospital Problems    Diagnosis   • *Acute exacerbation of CHF (congestive heart failure) (CMS-HCC) [I50.9]   • Hypoxia and lactic acidosis [R09.02]   • Normocytic anemia [D64.9]   • IDDM (insulin dependent diabetes mellitus) (CMS-HCC) [E11.9, Z79.4]   • S/P CABG (coronary artery bypass graft) [Z95.1]   • Pleural effusion [J90]       Review of Systems   Constitutional: Negative for fever, chills and diaphoresis.   HENT: Negative for congestion and sore throat.    Eyes: Negative for blurred vision.   Respiratory: Positive for cough and shortness of breath. Negative for hemoptysis.    Cardiovascular: Positive for leg swelling. Negative for chest  pain, palpitations and orthopnea.   Gastrointestinal: Negative for nausea, vomiting, abdominal pain, diarrhea, constipation and blood in stool.   Genitourinary: Negative for dysuria, urgency and frequency.   Musculoskeletal: Negative for myalgias and joint pain.   Skin: Negative for itching and rash.   Neurological: Positive for dizziness and weakness. Negative for tingling, sensory change, focal weakness, loss of consciousness and headaches.       Consultants/Specialty  Cardiology     Disposition  Inpatient     Quality Measures  EKG reviewed, Labs reviewed and Medications reviewed  Yeager catheter: No Yeager      DVT Prophylaxis: Enoxaparin (Lovenox)    Ulcer prophylaxis: Yes               Filed Vitals:    01/19/17 0000 01/19/17 0400 01/19/17 0800 01/19/17 1200   BP: 137/90 117/71 121/80 109/68   Pulse: 77 67 67 54   Temp: 36.3 °C (97.3 °F) 36.7 °C (98 °F) 36.1 °C (97 °F) 35.9 °C (96.6 °F)   Resp: 18 20 16 18   Height:       Weight:       SpO2: 95% 90% 95% 92%     Body mass index is 27.23 kg/(m^2). Weight: 91.1 kg (200 lb 13.4 oz)  Oxygen Therapy:  Pulse Oximetry: 92 %, O2 (LPM): 3.5, O2 Delivery: Nasal Cannula    Physical Exam   Constitutional: He is oriented to person, place, and time and well-developed, well-nourished, and in no distress. No distress.   HENT:   Head: Normocephalic and atraumatic.   Mouth/Throat: Oropharynx is clear and moist.   Eyes: Conjunctivae are normal. Pupils are equal, round, and reactive to light. No scleral icterus.   Neck: Normal range of motion. Neck supple. No JVD present. No tracheal deviation present. No thyromegaly present.   Cardiovascular: Normal rate, regular rhythm and normal heart sounds.  Exam reveals no gallop and no friction rub.    No murmur heard.  Pulmonary/Chest: Effort normal. He has no wheezes. He has no rales.   Diminished breath sounds bibasilar, crackles present    Abdominal: Soft. Bowel sounds are normal. He exhibits no distension. There is no tenderness. There is  no rebound and no guarding.   Musculoskeletal: He exhibits edema.   Neurological: He is alert and oriented to person, place, and time. No cranial nerve deficit.   Skin: Skin is warm and dry. No rash noted. He is not diaphoretic. No erythema.         Lab Data Review:      1/19/2017  1:45 PM    Recent Labs      01/18/17   1705  01/19/17   0243   SODIUM  136  137   POTASSIUM  4.0  3.9   CHLORIDE  108  106   CO2  19*  25   BUN  25*  23*   CREATININE  0.85  0.85   MAGNESIUM  1.9  1.9   CALCIUM  8.3*  8.2*       Recent Labs      01/18/17   1705  01/19/17   0243   ALTSGPT  44  41   ASTSGOT  20  22   ALKPHOSPHAT  139*  139*   TBILIRUBIN  0.7  0.6   GLUCOSE  556*  453*       Recent Labs      01/18/17   1705  01/19/17   0243   RBC  4.41*  4.08*   HEMOGLOBIN  11.6*  11.2*   HEMATOCRIT  38.6*  35.5*   PLATELETCT  184  150*   IRON   --   38*   FERRITIN   --   767.5*   TOTIRONBC   --   214*       Recent Labs      01/18/17   1705  01/19/17   0243   WBC  6.8  7.2   NEUTSPOLYS  65.00  47.00   LYMPHOCYTES  20.00*  38.00   MONOCYTES  5.00  11.00   EOSINOPHILS  0.00  4.00   BASOPHILS  0.00  0.00   ASTSGOT  20  22   ALTSGPT  44  41   ALKPHOSPHAT  139*  139*   TBILIRUBIN  0.7  0.6           Assessment/Plan     # Acute exacerbation of CHF (congestive heart failure) (CMS-HCC)  Assessment & Plan  CHF exacerbation due to inaccessibility to medications, underlying CAD with 4 vsl CABG in Oct, 2016  Crackles to mid lower lungs with wheezing, bilateral 2+ edema  Last EF (12/23/16): 35% Grade 3 diastolic dysfunction (restrictive pattern)  BNP 2349 on admit (baseline 300s 1/5/16), CXR showed increased pulmonary edema with mild - mod bilateral pleural effusions. Trop x 2 neg, EKG T inversions in V2, V4-6, aVL, not changed in subsequent EKG.     - Cardiology consulted by ER, resume medications, diuresis, Echocardiogram, Lexiscan.   - hold echo order for now since he recently got one in December and patient is currently in acute exacerbation. NPO  with meds after midnight for possible lexiscan tomorrow.   - diuresis with lasix, currently volume overloaded, monitor BUN, Cr due to G 3 diastolic dysfunction with restrictive pattern and BUN 25 on admit.   - resumed aspirin, plavix, coreg, lisinopril. Hold spironolactone, consider resuming after stabilization  - consider thoracic surgery consult for right pleurodesis if pleural effusion does not respond to diuresis.  - monitor H & H for possible GI bleed on double platelet therapy due to Hx of GI bleed and blood transfusion in Oct admission. Continue omeprazole.  - Per patient, he is supposed to meet with [facility] for his prescription today or tomorrow for his prescriptions so that they will be ready when he get discharged this time.  - 1/19: continue diuresis with lasix 40mg TID will continue to monitor BP, continue aspirin, clopidogrel, carvedilol, lisinopril and rosuvastatin. DC echo since recently done in December, will reassess with cardiac status changes     # Hypoxia and lactic acidosis  Assessment & Plan  - supposed to be on home O2 which he could not get. He will get oxygen concentrator this time so that he does not need to replete again.   - lactic acidosis: HCO3 19, LA 2.9 trended down to 2.1.   - received Unasyn and azithro, blood Cx, UA (neg for infection), Ur Cx at ER.   - will hold Abx for now, WBC normal, afebrile, dry cough with viral URI symptoms. His hypoxia is more likely from pulmonary edema secondary to CHF leading decreased tissue perfusion and lactic acidosis. Follow lactic acid and monitor.  - 1/19: HCO3 improved to 25, LA trending down to 1.4    # IDDM (insulin dependent diabetes mellitus) (CMS-[facility])  Assessment & Plan  - on metformin 500 bid, glargine 10 U QD  - last Hb A1 C 6.1 on 12/24/16, blood glucose 556 on admit due to lack of medications  - hold metformin, continue glargine 10 U QD, ISS, hypoglycemic protocol  - 1/19: morning BG still elevated at 453, increased Lantus to 15 units  every evening, continue sliding ISS and hypoglycemic protocol     Normocytic anemia  Assessment & Plan  Iron deficiency + anemia of chronic disease. (Low iron, low TIBC, normal saturation, normal B12, folate, Hx of GI Bleed)  - will give Fe supplements.  - ctm

## 2017-01-20 PROBLEM — I25.5 ISCHEMIC CARDIOMYOPATHY: Status: ACTIVE | Noted: 2017-01-01

## 2017-01-20 PROBLEM — I50.43 ACUTE ON CHRONIC COMBINED SYSTOLIC AND DIASTOLIC CONGESTIVE HEART FAILURE (HCC): Status: ACTIVE | Noted: 2017-01-01

## 2017-01-20 PROBLEM — Z59.9 FINANCIAL DIFFICULTIES: Status: ACTIVE | Noted: 2017-01-01

## 2017-01-20 PROBLEM — J96.01 ACUTE RESPIRATORY FAILURE WITH HYPOXIA (HCC): Status: ACTIVE | Noted: 2017-01-01

## 2017-01-20 NOTE — PROGRESS NOTES
Received bedside report from PM nurse. Assumed patient care. Chart reviewed. Pt was resting in chair. A&O x 4. No concerns, complaints or distress. Patient denies pain. POC updated with pt and on the patient communication board. Bed locked and in the lowest position. Call light within reach. Tele box on. Will continue to monitor.

## 2017-01-20 NOTE — PROGRESS NOTES
Cardiology Progress Note               Author: Gatito Wade Date & Time created: 2017  9:28 AM     Interval History:  Feels better  Much less dyspnic  No CP  Diuresing well  Tolerating meds  Monitor reviewed by me showed no significant ventricular or atrial dysrhythmias.      Review of Systems   Constitutional: Negative for malaise/fatigue.   HENT: Negative for congestion.    Respiratory: Negative for hemoptysis and shortness of breath.    Cardiovascular: Positive for leg swelling. Negative for chest pain, palpitations, orthopnea, claudication and PND.   Gastrointestinal: Negative for nausea, vomiting and abdominal pain.   Musculoskeletal: Negative for myalgias.   Neurological: Negative for dizziness, speech change, focal weakness and weakness.       Physical Exam   Constitutional: He is oriented to person, place, and time. No distress.   HENT:   Mouth/Throat: Mucous membranes are normal.   Eyes: Conjunctivae and EOM are normal.   Neck: No JVD present. No tracheal deviation present. No thyroid mass and no thyromegaly present.   Cardiovascular: Normal rate, regular rhythm and intact distal pulses.    No murmur heard.  Pulmonary/Chest: Effort normal. No respiratory distress. He has rales in the right middle field, the right lower field and the left lower field. He exhibits no tenderness.   Abdominal: Soft. There is no tenderness.   Musculoskeletal: He exhibits edema.   Neurological: He is alert and oriented to person, place, and time. He has normal strength. He displays no tremor.   Skin: Skin is warm and dry. He is not diaphoretic.   Psychiatric: He has a normal mood and affect. His behavior is normal.   Vitals reviewed.      Hemodynamics:  Temp (24hrs), Av.3 °C (97.3 °F), Min:35.9 °C (96.6 °F), Max:36.7 °C (98 °F)  Temperature: 36.2 °C (97.1 °F)  Pulse  Av.3  Min: 54  Max: 97   Blood Pressure: 133/78 mmHg     Respiratory:    Respiration: 19, Pulse Oximetry: 97 %        RUL Breath Sounds:  Diminished, RML Breath Sounds: Diminished, RLL Breath Sounds: Diminished, MIKE Breath Sounds: Diminished, LLL Breath Sounds: Diminished  Fluids:     Weight: 90 kg (198 lb 6.6 oz)  GI/Nutrition:  Orders Placed This Encounter   Procedures   • DIET ORDER     Standing Status: Standing      Number of Occurrences: 1      Standing Expiration Date:      Order Specific Question:  Diet:     Answer:  2 Gram Sodium [7]     Order Specific Question:  Diet:     Answer:  Cardiac [6]     Lab Results:  Recent Labs      01/18/17 1705 01/19/17   0243   WBC  6.8  7.2   RBC  4.41*  4.08*   HEMOGLOBIN  11.6*  11.2*   HEMATOCRIT  38.6*  35.5*   MCV  87.5  87.0   MCH  26.3*  27.5   MCHC  30.1*  31.5*   RDW  54.8*  53.7*   PLATELETCT  184  150*   MPV  11.0  10.7     Recent Labs      01/18/17 1705 01/19/17   0243  01/20/17   0205   SODIUM  136  137  141   POTASSIUM  4.0  3.9  3.7   CHLORIDE  108  106  107   CO2  19*  25  26   GLUCOSE  556*  453*  251*   BUN  25*  23*  22   CREATININE  0.85  0.85  0.86   CALCIUM  8.3*  8.2*  8.4*         Recent Labs      01/18/17   1709   BNPBTYPENAT  2349*     Recent Labs      01/18/17 1705 01/18/17   1709 01/18/17   2302   TROPONINI  <0.01   --   <0.01   BNPBTYPENAT   --   2349*   --              Medical Decision Making, by Problem:  Active Hospital Problems    Diagnosis   • *Acute exacerbation of CHF systolic and diastolic, class IV (congestive heart failure) (CMS-HCC) [I50.9]  Due to non-compliance  improving   • Ischemic CMP   • Normocytic anemia [D64.9]   • IDDM (insulin dependent diabetes mellitus) (CMS-HCC) [E11.9, Z79.4]   • S/P CABG (coronary artery bypass graft) [Z95.1]   • Pleural effusion [J90]   Still with sig pulm congestion  Tolerating lisinopril 10 mg/d    Plan:  Extra furosemide today  Increase lisinopril to 20 mg/d  No need to repeat ECHO or MPI  Add spironolactone  Home in a day or two    Core Measures

## 2017-01-20 NOTE — CARE PLAN
Problem: Safety  Goal: Will remain free from falls  Intervention: Assess risk factors for falls    01/19/17 2000   OTHER   Fall Risk Risk to Fall - 0 - 1 point   Risk for Injury-Any positive answers results in the pt being at high risk for fall related injury Not Applicable   Mobility Status Assessment 1-1 Healthcare Provider Required for Assistance with Ambulation & Transfer   History of fall 0   Pt Calls for Assistance Yes           Problem: Knowledge Deficit  Goal: Knowledge of disease process/condition, treatment plan, diagnostic tests, and medications will improve  Intervention: Assess knowledge level of disease process/condition, treatment plan, diagnostic tests, and medications  Discussed POC  and medications with pt .  Pt expressed verbal understanding and denies any additional questions or concerns.

## 2017-01-20 NOTE — PROGRESS NOTES
Bed-side report received from day RN.  Patient A &O x 4.   Pleasant and cooperative.  Discussed POC and assessment completed.  Questions answered and patient expressed understanding.  Denies pain and SOB.  On room air.  Pt call bell within reach.   Bed in lowest position and locked. Fall precautions in place. Will continue to monitor.

## 2017-01-20 NOTE — PROGRESS NOTES
[facility] [place] [facility] [place]val [place]    Name Abner Torres     1960   Age/Sex 56 y.o. male   MRN [id number]   Code Status Full      After 5PM or if no immediate response to page, please call for cross-coverage  Attending/Team: Jaycee/ JAQUI Hitchcock  Call [phone] to page   1st Call - Day Intern (R1):   Dr. Olguin 2nd Call - Day Sr. Resident (R2/R3):   Dr. Ulloa         Chief complaint/ reason for interval visit (Primary Diagnosis)   Dizziness, Cough and SOB x 1 day       ID: Mr. Torres is a 56 year old male with a PMHx of DM2, HTN and CAD s/p CABG in 2016. He presented to the ED with dizziness, cough and SOB x 1 day. He was recently released from rehabilitation center and states he has not taken any of his prescribed medications  including his insulin since his release 2 weeks ago due to lack of money to purchase them and miscommunication regarding what medications he was supposed to continue.     No acute events overnight, reports improvement of his symptoms after receiving lasix. He still has some complaints of SOB and cough this morning but denies any chest pain or palpitations. Discussed case with cardiologist and agreed to discontinue echo and stress test as patient had recent echo back in December and CABG back in October, with medications non compliance as cause of his current exacerbation. Will follow up with SW to arrange medications for patient as outpatient       Interval Problem Daily Status Update    Active Hospital Problems    Diagnosis   • *Acute exacerbation of CHF (congestive heart failure) (CMS-HCC) [I50.9]   • Hypoxia and lactic acidosis [R09.02]   • Normocytic anemia [D64.9]   • IDDM (insulin dependent diabetes mellitus) (CMS-HCC) [E11.9, Z79.4]   • S/P CABG (coronary artery bypass graft) [Z95.1]   • Pleural effusion [J90]       Review of Systems   Constitutional: Negative for fever, chills and diaphoresis.   HENT: Negative for congestion and sore  throat.    Eyes: Negative for blurred vision.   Respiratory: Positive for cough and shortness of breath. Negative for hemoptysis.    Cardiovascular: Positive for leg swelling. Negative for chest pain, palpitations and orthopnea.   Gastrointestinal: Negative for nausea, vomiting, abdominal pain, diarrhea, constipation and blood in stool.   Genitourinary: Negative for dysuria, urgency and frequency.   Musculoskeletal: Negative for myalgias and joint pain.   Skin: Negative for itching and rash.   Neurological: Positive for dizziness and weakness. Negative for tingling, sensory change, focal weakness, loss of consciousness and headaches.       Consultants/Specialty  Cardiology     Disposition  Inpatient     Quality Measures  EKG reviewed, Labs reviewed and Medications reviewed  Yeager catheter: No Yeager      DVT Prophylaxis: Enoxaparin (Lovenox)    Ulcer prophylaxis: Yes               Filed Vitals:    01/19/17 0400 01/19/17 0800 01/19/17 1200 01/19/17 1600   BP: 117/71 121/80 109/68 107/68   Pulse: 67 67 54 64   Temp: 36.7 °C (98 °F) 36.1 °C (97 °F) 35.9 °C (96.6 °F) 36.4 °C (97.5 °F)   Resp: 20 16 18 16   Height:       Weight:       SpO2: 90% 95% 92% 92%     Body mass index is 27.23 kg/(m^2). Weight: 91.1 kg (200 lb 13.4 oz)  Oxygen Therapy:  Pulse Oximetry: 92 %, O2 (LPM): 3.5, O2 Delivery: Nasal Cannula    Physical Exam   Constitutional: He is oriented to person, place, and time and well-developed, well-nourished, and in no distress. No distress.   HENT:   Head: Normocephalic and atraumatic.   Mouth/Throat: Oropharynx is clear and moist.   Eyes: Conjunctivae are normal. Pupils are equal, round, and reactive to light. No scleral icterus.   Neck: Normal range of motion. Neck supple. No JVD present. No tracheal deviation present. No thyromegaly present.   Cardiovascular: Normal rate, regular rhythm and normal heart sounds.  Exam reveals no gallop and no friction rub.    No murmur heard.  Pulmonary/Chest: Effort normal.  He has no wheezes. He has no rales.   Diminished breath sounds bibasilar, crackles present    Abdominal: Soft. Bowel sounds are normal. He exhibits no distension. There is no tenderness. There is no rebound and no guarding.   Musculoskeletal: He exhibits edema.   Neurological: He is alert and oriented to person, place, and time. No cranial nerve deficit.   Skin: Skin is warm and dry. No rash noted. He is not diaphoretic. No erythema.         Lab Data Review:      1/19/2017  1:45 PM    Recent Labs      01/18/17   1705 01/19/17   0243   SODIUM  136  137   POTASSIUM  4.0  3.9   CHLORIDE  108  106   CO2  19*  25   BUN  25*  23*   CREATININE  0.85  0.85   MAGNESIUM  1.9  1.9   CALCIUM  8.3*  8.2*       Recent Labs      01/18/17 1705 01/19/17   0243   ALTSGPT  44  41   ASTSGOT  20  22   ALKPHOSPHAT  139*  139*   TBILIRUBIN  0.7  0.6   GLUCOSE  556*  453*       Recent Labs      01/18/17 1705 01/19/17   0243   RBC  4.41*  4.08*   HEMOGLOBIN  11.6*  11.2*   HEMATOCRIT  38.6*  35.5*   PLATELETCT  184  150*   IRON   --   38*   FERRITIN   --   767.5*   TOTIRONBC   --   214*       Recent Labs      01/18/17 1705 01/19/17   0243   WBC  6.8  7.2   NEUTSPOLYS  65.00  47.00   LYMPHOCYTES  20.00*  38.00   MONOCYTES  5.00  11.00   EOSINOPHILS  0.00  4.00   BASOPHILS  0.00  0.00   ASTSGOT  20  22   ALTSGPT  44  41   ALKPHOSPHAT  139*  139*   TBILIRUBIN  0.7  0.6           Assessment/Plan     Acute exacerbation of CHF (congestive heart failure) (CMS-[facility])  Assessment & Plan  CHF exacerbation due to inaccessibility to medications, underlying CAD with 4 vsl CABG in Oct, 2016  Crackles to mid lower lungs with wheezing, bilateral 2+ edema  Last EF (12/23/16): 35% Grade 3 diastolic dysfunction (restrictive pattern)  BNP 2349 on admit (baseline 300s 1/5/16), CXR showed increased pulmonary edema with mild - mod bilateral pleural effusions. Trop x 2 neg, EKG T inversions in V2, V4-6, aVL, not changed in subsequent EKG.     - Cardiology  consulted by ER, resume medications, diuresis, Echocardiogram, Lexiscan.   - hold echo order for now since he recently got one in December and patient is currently in acute exacerbation. NPO with meds after midnight for possible lexiscan tomorrow.   - diuresis with lasix, currently volume overloaded, monitor BUN, Cr due to G 3 diastolic dysfunction with restrictive pattern and BUN 25 on admit.   - resumed aspirin, plavix, coreg, lisinopril. Hold spironolactone, consider resuming after stabilization  - consider thoracic surgery consult for right pleurodesis if pleural effusion does not respond to diuresis.  - monitor H & H for possible GI bleed on double platelet therapy due to Hx of GI bleed and blood transfusion in Oct admission. Continue omeprazole.  - Per patient, he is supposed to meet with [facility] for [facility] prescription today or tomorrow for his prescriptions so that they will be ready when he get discharged this time.  - 1/19: continue diuresis with lasix 40mg TID will continue to monitor BP, continue aspirin, clopidogrel, carvedilol, lisinopril and rosuvastatin. DC echo since recently done in December, will reassess with cardiac status changes     Hypoxia and lactic acidosis  Assessment & Plan  - supposed to be on home O2 which he could not get. He will get oxygen concentrator this time so that he does not need to replete again.   - lactic acidosis: HCO3 19, LA 2.9 trended down to 2.1.   - received Unasyn and azithro, blood Cx, UA (neg for infection), Ur Cx at ER.   - will hold Abx for now, WBC normal, afebrile, dry cough with viral URI symptoms. His hypoxia is more likely from pulmonary edema secondary to CHF leading decreased tissue perfusion and lactic acidosis. Follow lactic acid and monitor.  - 1/19: HCO3 improved to 25, LA trending down to 1.4    IDDM (insulin dependent diabetes mellitus) (CMS-[facility])  Assessment & Plan  - on metformin 500 bid, glargine 10 U QD  - last Hb A1 C 6.1 on 12/24/16, blood  glucose 556 on admit due to lack of medications  - hold metformin, continue glargine 10 U QD, ISS, hypoglycemic protocol  - 1/19: morning BG still elevated at 453, increased Lantus to 15 units every evening, continue sliding ISS and hypoglycemic protocol     Normocytic anemia  Assessment & Plan  Iron deficiency + anemia of chronic disease. (Low iron, low TIBC, normal saturation, normal B12, folate, Hx of GI Bleed)  - will give Fe supplements.  - ctm

## 2017-01-20 NOTE — HEART FAILURE PROGRAM
"Cardiovascular Nurse Navigator () Progress Note:     This CNN met with patient at bedside. Pt lives alone in [place] and does not drive. Pt walks wherever he has to go. Pt states that he was discharged from Rehab with prescriptions but could not get his medications because his \"disability check\" still hadn't come and that he still does not have it.     When asked if he is looking in to the delay with the check, pt states that \"work is doing that\". Asked pt if he had a job, pt states that he was working at a [employer] until he got sick. Suspect that perhaps pt's employer has told him that he needs to apply for disability? Pt was not able to clearly answer on this.     Concerns for patient's ability to afford medications and co-pays for follow up appointments after d/c. As such, this CNN has placed an order for SW to review whether or not pt truly has disability or if it is in process. Also, if pt can not afford his medications, would like for him to be sent with some.    Finally, would like for pt to be given a bus pass in order to get to appointments since he does not drive and states that walking has become increasingly difficult for him.    Pt has been given a scale to take home. Reviewed anatomy and physiology of systolic and diastolic heart failure as well as his prior CABG with patient. Spent extensive amount of time discussing medications as they relate to these two conditions and how critical they are.    Discussed daily weights, sodium restriction, worsening signs and symptoms to report to physician, heart medications, and importance of adherence to medication regimen. Patient states full understanding of all information given. Pt endorses financial difficulty with obtaining medications, and logistical difficulty with getting to and from appointments.       "

## 2017-01-20 NOTE — PROGRESS NOTES
Pt sleeping, unlabored breathing.  Call bell within reach.  Bed alarm on.   Pt calling for assistance.  Will continue to monitor.

## 2017-01-20 NOTE — PROGRESS NOTES
.18/.08/.40 SR 62-6, rare PAC. Pt sitting up in bed, watching TV. No complaints. Pt asks for suction to clear secretions- provided.

## 2017-01-20 NOTE — DISCHARGE PLANNING
Care Transition Team Assessment              Elopement Risk  Legal Hold: No  Ambulatory or Self Mobile in Wheelchair: Yes  Disoriented: No  Psychiatric Symptoms: None  History of Wandering: No  Elopement this Admit: No  Vocalizing Wanting to Leave: No  Displays Behaviors, Body Language Wanting to Leave: No-Not at Risk for Elopement  Elopement Risk: Not at Risk for Elopement    Interdisciplinary Discharge Planning  Does Admitting Nurse Feel This Could be a Complex Discharge?: No  Primary Care Physician: none  Lives with - Patient's Self Care Capacity: Alone and Able to Care For Self  Patient or legal guardian wants to designate a caregiver (see row info): No  Support Systems: Family Member(s)  Housing / Facility: 1 Story Apartment / Condo  Do You Take your Prescribed Medications Regularly: No  Reasons Why Not Taking Medications : Financial Reasons  Able to Return to Previous ADL's: Yes  Mobility Issues: No  Prior Services: None  Patient Expects to be Discharged to:: home  Assistance Needed: Unknown at this Time  Durable Medical Equipment: Not Applicable    Discharge Preparedness  [facility] resources needed?: None  What is your plan after discharge?: Other (comment) (home)  Do you have concerns about your hospital stay or care after discharge?: No  Difficulity with ADLs: None  Difficulity with IADLs: None  Pharmacy: [facility] [address] [facility] [address]  Prescription Coverage: Yes         Finances  Source of Income: Employed  Medical Insurance Coverage: Private insurance    Vision / Hearing Impairment  Vision Impairment : No  Hearing Impairment : No    Values / Beliefs / Concerns  Values / Beliefs Concerns : No         Domestic Abuse  Have you ever been the victim of abuse or violence?: No  Physical Abuse or Sexual Abuse: No  Verbal Abuse or Emotional Abuse: No  Possible Abuse Reported to:: Not Applicable    Psychological Assessment  Suspect Abuse: No  Suspect Domestic Violence: No  History of Substance Abuse: None  History of Psychiatric  Problems: No  Non-compliant with Treatment: No  Newly Diagnosed Catastrophic Illness: No  Needs Assistance Dealing with Catastrophic Illness: No  Cancer Diagnosis per Medical Record: No  Facing Drastic Life Change: No  No Needs at this Time: No Needs at This Time    Discharge Risks or Barriers  Discharge risks or barriers?: Lives alone, no community support  Patient risk factors: Lives alone and no community support    Anticipated Discharge Information  Anticipated discharge disposition: Home  Discharge Address: [address] [place] [address]  Discharge Contact Phone Number: [phone]

## 2017-01-20 NOTE — THERAPY
"Physical Therapy Evaluation completed.   Bed Mobility:  Supine to Sit: Supervised  Transfers: Sit to Stand: Supervised  Gait: Level Of Assist: Supervised with No Equipment Needed       Plan of Care: Patient with no further skilled PT needs in the acute care setting at this time  Discharge Recommendations: Equipment: No Equipment Needed.     See \"Rehab Therapy-Acute\" Patient Summary Report for complete documentation.     "

## 2017-01-20 NOTE — PROGRESS NOTES
[facility] [place] [facility] Interval Note    Name Abner Torres     1960   Age/Sex 56 y.o. male   MRN [id number]   Code Status Full      After 5PM or if no immediate response to page, please call for cross-coverage  Attending/Team:Dr. Champion/ Naldo Call [phone] to page   1st Call - Day Intern (R1):   Dr. Victor 2nd Call - Day Sr. Resident (R2/R3):   Dr. Ulloa         Chief complaint/ reason for interval visit (Primary Diagnosis)   Dizziness, cough and SOB x 1 day     ID: Mr. Torres is a 56 year old male with a PMHx of DM2, HTN and CAD s/p CABG in 2016. He presented to the ED with dizziness, cough and SOB x 1 day. He was recently released from rehabilitation center and states he has not taken any of his prescribed medications  including his insulin since his release 2 weeks ago due to lack of money to purchase them and miscommunication regarding what medications he was supposed to continue.     Mr. Torres continues to improve. He states he still has some Shortness of breath when moving from bed to chair but this is improving. He denies any chest pain or palpitations.     Interval Problem Daily Status Update    Active Hospital Problems    Diagnosis   • *Acute on chronic combined systolic and diastolic congestive heart failure (CMS-[facility]) [I50.43]   • Hypoxia and lactic acidosis [R09.02]   • Normocytic anemia [D64.9]   • IDDM (insulin dependent diabetes mellitus) (CMS-[facility]) [E11.9, Z79.4]   • Ischemic cardiomyopathy [I25.5]   • S/P CABG (coronary artery bypass graft) [Z95.1]   • Pleural effusion [J90]       Review of Systems   Constitutional: Negative for fever, chills and diaphoresis.   HENT: Negative for congestion and sore throat.    Eyes: Negative for blurred vision.   Respiratory: Positive for shortness of breath. Negative for cough.    Cardiovascular: Positive for leg swelling. Negative for chest pain, palpitations and orthopnea.   Gastrointestinal: Negative for  nausea, vomiting, abdominal pain, diarrhea and constipation.   Genitourinary: Negative for dysuria, urgency, frequency, hematuria and flank pain.   Musculoskeletal: Negative for myalgias.   Skin: Negative for itching and rash.   Neurological: Negative for dizziness, weakness and headaches.       Consultants/Specialty  Cardiology     Disposition  Inpatient     Quality Measures  Labs reviewed and Medications reviewed  Yeager catheter: No Yeager      DVT Prophylaxis: Enoxaparin (Lovenox)    Ulcer prophylaxis: Yes               Filed Vitals:    01/20/17 0000 01/20/17 0400 01/20/17 0800 01/20/17 1200   BP: 121/76 112/67 133/78 117/75   Pulse: 69 70 71 69   Temp: 36.7 °C (98 °F) 36.2 °C (97.1 °F) 36.2 °C (97.1 °F) 36.5 °C (97.7 °F)   Resp: 18 18 19 19   Height:       Weight:       SpO2: 92% 93% 97% 98%     Body mass index is 26.9 kg/(m^2). Weight: 90 kg (198 lb 6.6 oz)  Oxygen Therapy:  Pulse Oximetry: 98 %, O2 (LPM): 3.5, O2 Delivery: Nasal Cannula    Physical Exam  Constitutional: He is oriented to person, place, and time and well-developed, well-nourished, and in no distress. No distress.   HENT:    Head: Normocephalic and atraumatic.    Mouth/Throat: Oropharynx is clear and moist.   Eyes: Conjunctivae are normal. Pupils are equal, round, and reactive to light. No scleral icterus.   Neck: Normal range of motion. Neck supple. No JVD present. No tracheal deviation present. No thyromegaly present.   Cardiovascular: Normal rate, regular rhythm and normal heart sounds.  Exam reveals no gallop and no friction rub.     No murmur heard.  Pulmonary/Chest: Effort normal. He has no wheezes. He has no rales.   Crackles present bilaterally in lower bases   Abdominal: Soft. Bowel sounds are normal. He exhibits no distension. There is no tenderness. There is no rebound and no guarding.   Musculoskeletal: He exhibits 1+ edema.   Neurological: He is alert and oriented to person, place, and time. No cranial nerve deficit.   Skin: Skin is  warm and dry. No rash noted. He is not diaphoretic. No erythema.     Lab Data Review:      1/20/2017  1:29 PM    Recent Labs      01/18/17 1705 01/19/17   0243  01/20/17   0205   SODIUM  136  137  141   POTASSIUM  4.0  3.9  3.7   CHLORIDE  108  106  107   CO2  19*  25  26   BUN  25*  23*  22   CREATININE  0.85  0.85  0.86   MAGNESIUM  1.9  1.9   --    CALCIUM  8.3*  8.2*  8.4*       Recent Labs      01/18/17 1705 01/19/17   0243  01/20/17   0205   ALTSGPT  44  41   --    ASTSGOT  20  22   --    ALKPHOSPHAT  139*  139*   --    TBILIRUBIN  0.7  0.6   --    GLUCOSE  556*  453*  251*       Recent Labs      01/18/17 1705 01/19/17   0243   RBC  4.41*  4.08*   HEMOGLOBIN  11.6*  11.2*   HEMATOCRIT  38.6*  35.5*   PLATELETCT  184  150*   IRON   --   38*   FERRITIN   --   767.5*   TOTIRONBC   --   214*       Recent Labs      01/18/17 1705 01/19/17   0243   WBC  6.8  7.2   NEUTSPOLYS  65.00  47.00   LYMPHOCYTES  20.00*  38.00   MONOCYTES  5.00  11.00   EOSINOPHILS  0.00  4.00   BASOPHILS  0.00  0.00   ASTSGOT  20  22   ALTSGPT  44  41   ALKPHOSPHAT  139*  139*   TBILIRUBIN  0.7  0.6           Assessment/Plan     # Acute exacerbation of CHF (congestive heart failure) (CMS-HCC)  Assessment & Plan  CHF exacerbation due to inaccessibility to medications, underlying CAD with 4 vsl CABG in Oct, 2016  Crackles to mid lower lungs with wheezing, bilateral 2+ edema  Last EF (12/23/16): 35% Grade 3 diastolic dysfunction (restrictive pattern)  BNP 2349 on admit (baseline 300s 1/5/16), CXR showed increased pulmonary edema with mild - mod bilateral pleural effusions. Trop x 2 neg, EKG T inversions in V2, V4-6, aVL, not changed in subsequent EKG.     - Cardiology consulted by ER, resume medications, diuresis, Echocardiogram, Lexiscan.    - hold echo order for now since he recently got one in December and patient is currently in acute exacerbation. NPO with meds after midnight for possible lexiscan tomorrow.    - diuresis with  lasix, currently volume overloaded, monitor BUN, Cr due to G 3 diastolic dysfunction with restrictive pattern and BUN 25 on admit.    - resumed aspirin, plavix, coreg, lisinopril. Hold spironolactone, consider resuming after stabilization  - consider thoracic surgery consult for right pleurodesis if pleural effusion does not respond to diuresis.  - monitor H & H for possible GI bleed on double platelet therapy due to Hx of GI bleed and blood transfusion in Oct admission. Continue omeprazole.  - Per patient, he is supposed to meet with [facility] for his prescription today or tomorrow for his prescriptions so that they will be ready when he get discharged this time.  - 1/19: continue diuresis with lasix 40mg TID will continue to monitor BP, continue aspirin, clopidogrel, carvedilol, lisinopril and rosuvastatin. DC echo since recently done in December, will reassess with cardiac status changes   - 1/20: Cardiology on board, appreciate recommendations. Will continue medications as ordered by cards    - carvedilol 6.25mg BID   - Lasix 40mg BID    - Lisinopril 20mg daily    - Spironolactone 25mg daily     # Hypoxia and lactic acidosis  Assessment & Plan  - supposed to be on home O2 which he could not get. He will get oxygen concentrator this time so that he does not need to replete again.    - lactic acidosis: HCO3 19, LA 2.9 trended down to 2.1.    - received Unasyn and azithro, blood Cx, UA (neg for infection), Ur Cx at ER.    - will hold Abx for now, WBC normal, afebrile, dry cough with viral URI symptoms. His hypoxia is more likely from pulmonary edema secondary to CHF leading decreased tissue perfusion and lactic acidosis. Follow lactic acid and monitor.  - 1/19: HCO3 improved to 25, LA trending down to 1.4  - Patient sating well on 3.5L @ 98%, will continue to monitor O2 needs as diuresis continues. HCO3 stable     # IDDM (insulin dependent diabetes mellitus) (CMS-[facility])  Assessment & Plan  - on metformin 500 bid,  glargine 10 U QD  - last Hb A1 C 6.1 on 12/24/16, blood glucose 556 on admit due to lack of medications  - hold metformin, continue glargine 10 U QD, ISS, hypoglycemic protocol  - 1/19: morning BG still elevated at 453, increased Lantus to 15 units every evening, continue sliding ISS and hypoglycemic protocol   - 1/20: Morning BG still elevated at 251, will increase Lantus to 20 units in evening     Normocytic anemia  Assessment & Plan  Iron deficiency + anemia of chronic disease. (Low iron, low TIBC, normal saturation, normal B12, folate, Hx of GI Bleed)  - Continue Fe supplements.  - ctm

## 2017-01-20 NOTE — CARE PLAN
Problem: Fluid Volume:  Goal: Will maintain balanced intake and output  Strict I&Os    Problem: Respiratory:  Goal: Respiratory status will improve  Respiratory rate and rhythm assessed and monitored. Oxygenation and saturation monitored and titrated as ordered. Patient positioned optimally. Collaboration with RT in place.

## 2017-01-21 NOTE — ASSESSMENT & PLAN NOTE
Hisotry of ecurrent pleural effusion with L side pleurodesis   CXR showed mild pleural effusion more evident on L side (1/18)  Most likely related to decompensated CHF   Plan  - Continue cardiac treatment for CHF  - Repeat CXR shows improvement in pulmonary edema and shrinking left pulmonary effusion

## 2017-01-21 NOTE — ASSESSMENT & PLAN NOTE
S/p 4 vessels CABG in 10/2016  Plan  - Continue aspirin, lisinopril and coreg  - No need for stress test

## 2017-01-21 NOTE — RESPIRATORY CARE
COPD EDUCATION by COPD CLINICAL EDUCATOR  1/21/2017 at 6:07 AM by Angelita Francisco     Patient reviewed by COPD education team. Patient does not qualify for COPD program.

## 2017-01-21 NOTE — PROGRESS NOTES
VSS. 3LNC. NSR. A/O x 4. No signs of distress. Plan of care reviewed, questions answered. Call light within reach.

## 2017-01-21 NOTE — ASSESSMENT & PLAN NOTE
Patient has financial hardships and can't get his disability checks to obtain his medications  Per patient, he is [facility]   Plan  - Will provide patient with 1 month supply of medications on discharge  - Encourage patient to follow up with his disability checks  - Switched his medications to generic ones

## 2017-01-21 NOTE — PROGRESS NOTES
[facility] [place] [facility] Interval [facility]    Name Abner Torres     1960   Age/Sex 56 y.o. male   MRN [id number]   Code Status Full     After 5PM or if no immediate response to page, please call for cross-coverage  Attending/Team: Dr. Champion/ JAQUI Hitchcock  Call [phone] to page   1st Call - Day Intern (R1):   Dr. Victor 2nd Call - Day Sr. Resident (R2/R3):   Dr. Ulloa         Chief complaint/ reason for interval visit (Primary Diagnosis)   Dizziness, cough and SOB x 1 day     ID: Mr. Torres is a 56 year old male with a PMHx of DM2, HTN and CAD s/p CABG in 2016. He presented to the ED with dizziness, cough and SOB x 1 day. He was recently released from rehabilitation center and states he has not taken any of his prescribed medications  including his insulin since his release 2 weeks ago due to lack of money to purchase them and miscommunication regarding what medications he was supposed to continue.       This morning patient is doing well. Having difficulty sleeping due to SOB when lying down but states he feels fine when sitting up. Repeat CXR shows improvement in pulmonary edema and shrinking left pulmonary effusion. Patient denies any chest pain, palpitations, fevers or general malaise.       Interval Problem Daily Status Update    Active Hospital Problems    Diagnosis   • *Acute on chronic combined systolic and diastolic congestive heart failure (CMS-[facility]) [I50.43]   • Normocytic anemia [D64.9]   • IDDM (insulin dependent diabetes mellitus) (CMS-[facility]) [E11.9, Z79.4]   • Ischemic cardiomyopathy [I25.5]   • Acute respiratory failure with hypoxia (CMS-[facility]) [J96.01]   • Financial difficulties [Z59.8]   • S/P CABG (coronary artery bypass graft) [Z95.1]   • Pleural effusion [J90]       Review of Systems   Constitutional: Negative for fever, chills, weight loss, malaise/fatigue and diaphoresis.   HENT: Negative for congestion and sore throat.    Eyes: Negative for blurred  vision.   Respiratory: Positive for cough and shortness of breath. Negative for hemoptysis, sputum production and wheezing.    Cardiovascular: Positive for orthopnea and leg swelling. Negative for chest pain and palpitations.   Gastrointestinal: Negative for nausea, vomiting, abdominal pain, diarrhea and constipation.   Genitourinary: Negative for dysuria, urgency, frequency and hematuria.   Musculoskeletal: Negative for myalgias.   Skin: Negative for itching and rash.   Neurological: Negative for dizziness, focal weakness and headaches.       Consultants/Specialty  Cardiology     Disposition  Inpatient     Quality Measures  Labs reviewed and Medications reviewed  Yeager catheter: No Yeager      DVT Prophylaxis: Enoxaparin (Lovenox)    Ulcer prophylaxis: Yes             Filed Vitals:    01/20/17 2000 01/21/17 0000 01/21/17 0500 01/21/17 0800   BP: 120/81 130/80 134/79 121/79   Pulse: 73 72 62 72   Temp: 35.6 °C (96 °F) 36.6 °C (97.8 °F) 36.4 °C (97.5 °F) 35.9 °C (96.7 °F)   Resp: 18 18 18 20   Height:       Weight: 87.4 kg (192 lb 10.9 oz)      SpO2: 98% 93% 94% 92%     Body mass index is 26.13 kg/(m^2). Weight: 87.4 kg (192 lb 10.9 oz)  Oxygen Therapy:  Pulse Oximetry: 92 %, O2 (LPM): 3, O2 Delivery: Silicone Nasal Cannula    Physical Exam      Lab Data Review:      1/21/2017  11:40 AM    Recent Labs      01/18/17 1705 01/19/17 0243 01/20/17 0205 01/21/17   0352   SODIUM  136  137  141  139   POTASSIUM  4.0  3.9  3.7  3.3*   CHLORIDE  108  106  107  104   CO2  19*  25  26  27   BUN  25*  23*  22  22   CREATININE  0.85  0.85  0.86  0.82   MAGNESIUM  1.9  1.9   --   1.4*   CALCIUM  8.3*  8.2*  8.4*  8.6       Recent Labs      01/18/17 1705 01/19/17 0243 01/20/17 0205 01/21/17   0352   ALTSGPT  44  41   --    --    ASTSGOT  20  22   --    --    ALKPHOSPHAT  139*  139*   --    --    TBILIRUBIN  0.7  0.6   --    --    GLUCOSE  556*  453*  251*  102*       Recent Labs      01/18/17   1705  01/19/17   0242    RBC  4.41*  4.08*   HEMOGLOBIN  11.6*  11.2*   HEMATOCRIT  38.6*  35.5*   PLATELETCT  184  150*   IRON   --   38*   FERRITIN   --   767.5*   TOTIRONBC   --   214*       Recent Labs      01/18/17   1705  01/19/17   0243   WBC  6.8  7.2   NEUTSPOLYS  65.00  47.00   LYMPHOCYTES  20.00*  38.00   MONOCYTES  5.00  11.00   EOSINOPHILS  0.00  4.00   BASOPHILS  0.00  0.00   ASTSGOT  20  22   ALTSGPT  44  41   ALKPHOSPHAT  139*  139*   TBILIRUBIN  0.7  0.6           Assessment/Plan     * Acute on chronic combined systolic and diastolic congestive heart failure (CMS-HCC)  Assessment & Plan  Patient presented with SOB and orthopnea, associated with productive cough   He has history of ischemic cardiomyopathy s/p 4 vsl CABG in 10/2016 with last echo showing EF of 35% Grade 3 diastolic dysfunction (12/2016)  Exam revealed bilateral leg edema with bilateral crackles up to on third of lungs on admission  Labs showed BNP 2349, with negative trop x3 and non specific T wave changes on EKG (1/18)  CXR showed increased pulmonary edema with mild - mod bilateral pleural effusions (1/18)  Suspect CHF exacerbation due to inaccessibility to medications secondary to financial hardships   Patient was restarted on aspirin, plavix, coreg, lisinopril on admission  Cardiologist was consulted and recommended against repeating echo or doing stress test  Discontinue plavix (per cards rec's) to reduce cost of medications  Cardiology onboard, appreciate rec's  Plan  - Continue aspirin 81 MG and lisinopril 10 MG  - Continue coreg to 6.25 MG BID  - Continue atorvastatin 40 MG and lisinopril 25 MG  - Continue IV lasix 40 MG BID    Hypoxia and lactic acidosis  Assessment & Plan  - supposed to be on home O2 which he could not get. He will get oxygen concentrator this time so that he does not need to replete again.   - lactic acidosis: HCO3 19, LA 2.9 trended down to 2.1.   - received Unasyn and azithro, blood Cx, UA (neg for infection), Ur Cx at ER.   -  will hold Ab, WBC normal, afebrile, dry cough with viral URI symptoms. His hypoxia is more likely from pulmonary edema secondary to CHF leading decreased tissue perfusion and lactic acidosis. Follow lactic acid and monitor. (did have history of recurrent pneumonias)    S/P CABG (coronary artery bypass graft)  Assessment & Plan  S/p 4 vessels CABG in 10/2016  Plan  - Continue aspirin, lisinopril and coreg  - No need for stress test      Pleural effusion  Assessment & Plan  Hisotry of ecurrent pleural effusion with L side pleurodesis   CXR showed mild pleural effusion more evident on L side (1/18)  Most likely related to decompensated CHF  Plan  - Continue cardiac treatment for CHF  - F/U CXR: 1. Decreased pulmonary edema superimposed on atelectasis  2.  Small LEFT pleural effusion    Ischemic cardiomyopathy  Assessment & Plan  S/p 4 vessels CABG in 10/2016 with last echo showing EF of 35% Grade 3 diastolic dysfunction (12/2016)  Plan  See CHF section        Acute respiratory failure with hypoxia (CMS-HCC)  Assessment & Plan  Supposed to be on home oxygen, didn't get that set up due financial hardships.   Consider getting concentrator this time to avoid repletion issues   Hypoxic respiratory failure most likely related to acute CHF exacerbation, less likely to be related to PE or ongoing infection   Plan  - Continue cardiac treatment for CHF  - Continue oxygen therapy and watch for any sign of infection      Financial difficulties  Assessment & Plan  Patient has financial hardships and can't get his disability checks to obtain his medications  Per patient, he is [facility]   Plan  - Will provide patient with 1 month supply of medications on discharge  - Encourage patient to follow up with his disability checks  - Switched his medications to generic ones      IDDM (insulin dependent diabetes mellitus) (CMS-HCC)  Assessment & Plan  On metformin 500 bid, glargine 10 U QD at  home  Last Hb A1 C 6.1 (12/2016), blood glucose 556 on admission due medication non compliance   Blood sugar running less than 200s, controlled  Plan  - decrease insulin glargine to 17 units, glucose 94 this morning   - Continue ISS and hypoglycemia protocol   - Consider restarting metformin as patient will not be able to afford insulin after discharge     Normocytic anemia  Assessment & Plan  Iron deficiency + anemia of chronic disease. (Low iron, low TIBC, normal saturation, normal B12, folate, Hx of GI Bleed)  Plan  - Continue Fe supplements.  - ctm

## 2017-01-21 NOTE — ASSESSMENT & PLAN NOTE
Supposed to be on home oxygen, didn't get that set up due financial hardships.   Consider getting concentrator this time to avoid repletion issues   Hypoxic respiratory failure most likely related to acute CHF exacerbation, less likely to be related to PE or ongoing infection   His condition is slowly improving while on lasix therapy, will get CT chest to exclude possible interstitial lung disease   Plan  - Continue cardiac treatment for CHF  - Ordered CT chest w/o con  - Continue oxygen therapy and watch for any sign of infection

## 2017-01-21 NOTE — PROGRESS NOTES
[facility] – [place] [facility] Interval Note    Name Abner Torres     1960   Age/Sex 56 y.o. male   MRN [id number]   Code Status Full      After 5PM or if no immediate response to page, please call for cross-coverage  Attending/Team: Jaycee/ JAQUI Hitchcock  Call [phone] to page   1st Call - Day Intern (R1):   Dr. Olguin 2nd Call - Day Sr. Resident (R2/R3):   Dr. Ulloa         Chief complaint/ reason for interval visit (Primary Diagnosis)   Dizziness, Cough and SOB x 1 day     ID: Mr. Torres is a 56 year old male with a PMHx of DM2, HTN and CAD s/p CABG in 2016. He presented to the ED with dizziness, cough and SOB x 1 day. He was recently released from rehabilitation center and states he has not taken any of his prescribed medications  including his insulin since his release 2 weeks ago due to lack of money to purchase them and miscommunication regarding what medications he was supposed to continue.     No acute events overnight, patient is doing better this morning, sitting on chair with oxygen on, reports improvement of his symptoms and has no concern at the moment     Interval Problem Daily Status Update    Active Hospital Problems    Diagnosis   • *Acute exacerbation of CHF (congestive heart failure) (CMS-HCC) [I50.9]   • Hypoxia and lactic acidosis [R09.02]   • Normocytic anemia [D64.9]   • IDDM (insulin dependent diabetes mellitus) (CMS-HCC) [E11.9, Z79.4]   • S/P CABG (coronary artery bypass graft) [Z95.1]   • Pleural effusion [J90]       Review of Systems   Constitutional: Negative for fever, chills and diaphoresis.   HENT: Negative for congestion and sore throat.    Eyes: Negative for blurred vision.   Respiratory: Positive for cough and shortness of breath. Negative for hemoptysis.    Cardiovascular: Positive for leg swelling. Negative for chest pain, palpitations and orthopnea.   Gastrointestinal: Negative for nausea, vomiting, abdominal pain, diarrhea,  constipation and blood in stool.   Genitourinary: Negative for dysuria, urgency and frequency.   Musculoskeletal: Negative for myalgias and joint pain.   Skin: Negative for itching and rash.   Neurological: Positive for dizziness and weakness. Negative for tingling, sensory change, focal weakness, loss of consciousness and headaches.       Consultants/Specialty  Cardiology     Disposition  Inpatient, patient will most likely be discharged home after his condition improves     Quality Measures  EKG reviewed, Labs reviewed and Medications reviewed  Yeager catheter: No Yeager      DVT Prophylaxis: Enoxaparin (Lovenox)    Ulcer prophylaxis: Yes               Filed Vitals:    01/20/17 0400 01/20/17 0800 01/20/17 1200 01/20/17 1600   BP: 112/67 133/78 117/75 146/89   Pulse: 70 71 69 75   Temp: 36.2 °C (97.1 °F) 36.2 °C (97.1 °F) 36.5 °C (97.7 °F) 36.2 °C (97.1 °F)   Resp: 18 19 19 19   Height:       Weight:       SpO2: 93% 97% 98% 96%     Body mass index is 26.9 kg/(m^2). Weight: 90 kg (198 lb 6.6 oz)  Oxygen Therapy:  Pulse Oximetry: 96 %, O2 (LPM): 3.5, O2 Delivery: Nasal Cannula    Physical Exam   Constitutional: He is oriented to person, place, and time and well-developed, well-nourished, and in no distress. No distress.   HENT:   Head: Normocephalic and atraumatic.   Mouth/Throat: Oropharynx is clear and moist.   Eyes: Conjunctivae are normal. Pupils are equal, round, and reactive to light. No scleral icterus.   Neck: Normal range of motion. Neck supple. No JVD present. No tracheal deviation present. No thyromegaly present.   Cardiovascular: Normal rate, regular rhythm and normal heart sounds.  Exam reveals no gallop and no friction rub.    Pulmonary/Chest: Effort normal. He has no wheezes. He has no rales.   Diminished breath sounds bibasilar, crackles present    Abdominal: Soft. Bowel sounds are normal. He exhibits no distension. There is no tenderness. There is no rebound and no guarding.   Musculoskeletal: He  exhibits edema.   +1 bilateral pitting pedal edema   Neurological: He is alert and oriented to person, place, and time. No cranial nerve deficit.   Skin: Skin is warm and dry. No rash noted. He is not diaphoretic. No erythema.         Lab Data Review:      1/19/2017  1:45 PM    Recent Labs      01/18/17 1705 01/19/17 0243  01/20/17   0205   SODIUM  136  137  141   POTASSIUM  4.0  3.9  3.7   CHLORIDE  108  106  107   CO2  19*  25  26   BUN  25*  23*  22   CREATININE  0.85  0.85  0.86   MAGNESIUM  1.9  1.9   --    CALCIUM  8.3*  8.2*  8.4*       Recent Labs      01/18/17 1705 01/19/17   0243  01/20/17   0205   ALTSGPT  44  41   --    ASTSGOT  20  22   --    ALKPHOSPHAT  139*  139*   --    TBILIRUBIN  0.7  0.6   --    GLUCOSE  556*  453*  251*       Recent Labs      01/18/17 1705 01/19/17   0243   RBC  4.41*  4.08*   HEMOGLOBIN  11.6*  11.2*   HEMATOCRIT  38.6*  35.5*   PLATELETCT  184  150*   IRON   --   38*   FERRITIN   --   767.5*   TOTIRONBC   --   214*       Recent Labs      01/18/17 1705 01/19/17   0243   WBC  6.8  7.2   NEUTSPOLYS  65.00  47.00   LYMPHOCYTES  20.00*  38.00   MONOCYTES  5.00  11.00   EOSINOPHILS  0.00  4.00   BASOPHILS  0.00  0.00   ASTSGOT  20  22   ALTSGPT  44  41   ALKPHOSPHAT  139*  139*   TBILIRUBIN  0.7  0.6           Assessment/Plan     * Acute on chronic combined systolic and diastolic congestive heart failure (CMS-HCC)  Assessment & Plan  Patient presented with SOB and orthopnea, associated with productive cough   He has history of ischemic cardiomyopathy s/p 4 vsl CABG in 10/2016 with last echo showing EF of 35% Grade 3 diastolic dysfunction (12/2016)  Exam revealed bilateral leg edema with bilateral crackles up to on third of lungs on admission  Labs showed BNP 2349, with negative trop x3 and non specific T wave changes on EKG (1/18)  CXR showed increased pulmonary edema with mild - mod bilateral pleural effusions (1/18)  Suspect CHF exacerbation due to inaccessibility to  medications secondary to financial hardships   Patient was restarted on aspirin, plavix, coreg, lisinopril on admission  Cardiologist was consulted and recommended against repeating echo or doing stress test  Cardiology onboard, appreciate rec's  Plan  - Continue aspirin 81 MG and lisinopril 10 MG  - Decrease coreg to 6.25 MG BID  - Discontinue plavix (per cards rec's) to reduce cost of medications  - Switched rosuvastatin to atorvastatin to reduce cost   - Continue IV lasix 40 MG BID    Hypoxia and lactic acidosis  Assessment & Plan  - supposed to be on home O2 which he could not get. He will get oxygen concentrator this time so that he does not need to replete again.   - lactic acidosis: HCO3 19, LA 2.9 trended down to 2.1.   - received Unasyn and azithro, blood Cx, UA (neg for infection), Ur Cx at ER.   - will hold Ab, WBC normal, afebrile, dry cough with viral URI symptoms. His hypoxia is more likely from pulmonary edema secondary to CHF leading decreased tissue perfusion and lactic acidosis. Follow lactic acid and monitor. (did have history of recurrent pneumonias)    S/P CABG (coronary artery bypass graft)  Assessment & Plan  S/p 4 vessels CABG in 10/2016  Plan  - Continue aspirin, lisinopril and coreg  - No need for stress test     Pleural effusion  Assessment & Plan  Hisotry of ecurrent pleural effusion with L side pleurodesis   CXR showed mild pleural effusion more evident on L side (1/18)  Most likely related to decompensated CHF  Plan  - Continue cardiac treatment for CHF  - F/U CXR    Ischemic cardiomyopathy  Assessment & Plan  S/p 4 vessels CABG in 10/2016 with last echo showing EF of 35% Grade 3 diastolic dysfunction (12/2016)  Plan  See CHF section       Acute respiratory failure with hypoxia (CMS-[facility])  Assessment & Plan  Supposed to be on home oxygen, didn't get that set up due financial hardships.   Consider getting concentrator this time to avoid repletion issues   Hypoxic respiratory failure most  likely related to acute CHF exacerbation, less likely to be related to PE or ongoing infection   Plan  - Continue cardiac treatment for CHF  - Continue oxygen therapy and watch for any sign of infection     Financial difficulties  Assessment & Plan  Patient has financial hardships and can't get his disability checks to obtain his medications  Per patient, he is supposed to meet with [facility] for his prescription today or tomorrow for his prescriptions so that they will be ready when he get discharged this time  Plan  - Will provide patient with 1 month supply of medications on discharge  - Encourage patient to follow up with his disability checks  - Will switch his medications to generic ones     IDDM (insulin dependent diabetes mellitus) (CMS-[facility])  Assessment & Plan  On metformin 500 bid, glargine 10 U QD at home  Last Hb A1 C 6.1 (12/2016), blood glucose 556 on admission due medication non compliance   Blood sugar running around 250s, will increase insulin glargine  Plan  - Increase insulin glargine to 20 units   - Continue ISS and hypoglycemia protocol   - Consider restarting metformin as patient will not be able to afford insulin after discharge     Normocytic anemia  Assessment & Plan  Iron deficiency + anemia of chronic disease. (Low iron, low TIBC, normal saturation, normal B12, folate, Hx of GI Bleed)  Plan  - will give Fe supplements.  - ctm

## 2017-01-21 NOTE — PROGRESS NOTES
Cardiology Progress Note               Author: Gatito Wade Date & Time created: 2017  9:10 AM     Interval History:  No SOB or CP  Tolerating meds  Negative 2+ liters  Monitor reviewed by me showed no significant ventricular or atrial dysrhythmias.      Review of Systems   Constitutional: Negative for malaise/fatigue.   HENT: Negative for congestion.    Respiratory: Negative for hemoptysis and shortness of breath.    Cardiovascular: Negative for chest pain, palpitations, orthopnea, claudication, leg swelling and PND.   Gastrointestinal: Negative for nausea, vomiting and abdominal pain.   Musculoskeletal: Negative for myalgias.   Neurological: Negative for dizziness, speech change, focal weakness and weakness.       Physical Exam   Constitutional: He is oriented to person, place, and time. No distress.   HENT:   Mouth/Throat: Mucous membranes are normal.   Eyes: Conjunctivae and EOM are normal.   Neck: No tracheal deviation present. No thyroid mass and no thyromegaly present.   Cardiovascular: Normal rate, regular rhythm and intact distal pulses.    No murmur heard.  Pulmonary/Chest: Effort normal. No respiratory distress. He has rales in the right lower field and the left lower field. He exhibits no tenderness.   Abdominal: Soft. There is no tenderness.   Musculoskeletal: He exhibits no edema.   Neurological: He is alert and oriented to person, place, and time. He has normal strength. He displays no tremor.   Skin: Skin is warm and dry. He is not diaphoretic.   Psychiatric: He has a normal mood and affect. His behavior is normal.   Vitals reviewed.      Hemodynamics:  Temp (24hrs), Av.2 °C (97.2 °F), Min:35.6 °C (96 °F), Max:36.6 °C (97.8 °F)  Temperature: 36.4 °C (97.5 °F)  Pulse  Av.1  Min: 54  Max: 97   Blood Pressure: 134/79 mmHg     Respiratory:    Respiration: 18, Pulse Oximetry: 94 %     Work Of Breathing / Effort: Mild  RUL Breath Sounds: Diminished, RML Breath Sounds: Diminished, RLL  Breath Sounds: Diminished, MIKE Breath Sounds: Diminished, LLL Breath Sounds: Diminished  Fluids:  Date 01/21/17 0700 - 01/22/17 0659   Shift 3265-8861 [id number] 24 Hour Total   I  N  T  A  K  E   P.O. 120   120    Shift Total 120   120   O  U  T  P  U  T   Shift Total       Weight (kg) 87.4 87.4 87.4 87.4       Weight: 87.4 kg (192 lb 10.9 oz)  GI/Nutrition:  Orders Placed This Encounter   Procedures   • DIET ORDER     Standing Status: Standing      Number of Occurrences: 1      Standing Expiration Date:      Order Specific Question:  Diet:     Answer:  2 Gram Sodium [7]     Order Specific Question:  Diet:     Answer:  Cardiac [6]     Lab Results:  Recent Labs      01/18/17 1705 01/19/17   0243   WBC  6.8  7.2   RBC  4.41*  4.08*   HEMOGLOBIN  11.6*  11.2*   HEMATOCRIT  38.6*  35.5*   MCV  87.5  87.0   MCH  26.3*  27.5   MCHC  30.1*  31.5*   RDW  54.8*  53.7*   PLATELETCT  184  150*   MPV  11.0  10.7     Recent Labs      01/19/17   0243  01/20/17   0205  01/21/17   0352   SODIUM  137  141  139   POTASSIUM  3.9  3.7  3.3*   CHLORIDE  106  107  104   CO2  25  26  27   GLUCOSE  453*  251*  102*   BUN  23*  22  22   CREATININE  0.85  0.86  0.82   CALCIUM  8.2*  8.4*  8.6         Recent Labs      01/18/17   1709   BNPBTYPENAT  2349*     Recent Labs      01/18/17   1705  01/18/17   1709 01/18/17   2302   TROPONINI  <0.01   --   <0.01   BNPBTYPENAT   --   2349*   --              Medical Decision Making, by Problem:  Active Hospital Problems    Diagnosis   • *Acute on chronic combined systolic and diastolic congestive heart failure (CMS-HCC) [I50.43]  improved   • Normocytic anemia [D64.9]   • IDDM (insulin dependent diabetes mellitus) (CMS-[facility]) [E11.9, Z79.4]   • Ischemic cardiomyopathy [I25.5]   • Acute respiratory failure with hypoxia (CMS-[facility]) [J96.01]   • Financial difficulties [Z59.8]   • S/P CABG (coronary artery bypass graft) [Z95.1]   • Pleural effusion [J90]       Plan:  Continue current meds  May  d/c from our standpoint  Please arrange for f/u in 1 week    Core Measures

## 2017-01-21 NOTE — CARE PLAN
Problem: Venous Thromboembolism (VTW)/Deep Vein Thrombosis (DVT) Prevention:  Goal: Patient will participate in Venous Thrombosis (VTE)/Deep Vein Thrombosis (DVT)Prevention Measures  Outcome: PROGRESSING AS EXPECTED  Intervention: Assess and monitor for anticoagulation complications  Completed.      Problem: Knowledge Deficit  Goal: Knowledge of disease process/condition, treatment plan, diagnostic tests, and medications will improve  Outcome: PROGRESSING AS EXPECTED  Intervention: Explain information regarding disease process/condition, treatment plan, diagnostic tests, and medications and document in education  Completed.      Problem: Respiratory:  Goal: Respiratory status will improve  Outcome: PROGRESSING AS EXPECTED  Intervention: Assess and monitor pulmonary status  Completed.  Intervention: Administer and titrate oxygen therapy  3LNC  Intervention: Educate and encourage coughing and deep breathing  Completed.

## 2017-01-21 NOTE — PROGRESS NOTES
[facility] – [place] [facility] Interval Note    Name Abner Torres     1960   Age/Sex 56 y.o. male   MRN [id number]   Code Status Full      After 5PM or if no immediate response to page, please call for cross-coverage  Attending/Team: Jaycee/ JAQUI Hitchcock  Call [phone] to page   1st Call - Day Intern (R1):   Dr. Olguin 2nd Call - Day Sr. Resident (R2/R3):   Dr. Ulloa         Chief complaint/ reason for interval visit (Primary Diagnosis)   Dizziness, Cough and SOB x 1 day     ID: Mr. Torres is a 56 year old male with a PMHx of DM2, HTN and CAD s/p CABG in 2016. He presented to the ED with dizziness, cough and SOB x 1 day. He was recently released from rehabilitation center and states he has not taken any of his prescribed medications  including his insulin since his release 2 weeks ago due to lack of money to purchase them and miscommunication regarding what medications he was supposed to continue.     No acute events overnight, patient is doing better this morning, labs showed low potassium and Mg, blood sugars well controlled overnight with low fasting blood sugars, will decrease basal insulin dose. Repeat CXR shows improvement in pulmonary edema and shrinking left pulmonary effusion. Cardiology recommended F/U in 1 weeks after discharge       Interval Problem Daily Status Update    Active Hospital Problems    Diagnosis   • *Acute exacerbation of CHF (congestive heart failure) (CMS-HCC) [I50.9]   • Hypoxia and lactic acidosis [R09.02]   • Normocytic anemia [D64.9]   • IDDM (insulin dependent diabetes mellitus) (CMS-HCC) [E11.9, Z79.4]   • S/P CABG (coronary artery bypass graft) [Z95.1]   • Pleural effusion [J90]       Review of Systems   Constitutional: Negative for fever, chills and diaphoresis.   HENT: Negative for congestion and sore throat.    Eyes: Negative for blurred vision.   Respiratory: Positive for cough and shortness of breath. Negative for hemoptysis.     Cardiovascular: Positive for leg swelling. Negative for chest pain, palpitations and orthopnea.   Gastrointestinal: Negative for nausea, vomiting, abdominal pain, diarrhea, constipation and blood in stool.   Genitourinary: Negative for dysuria, urgency and frequency.   Musculoskeletal: Negative for myalgias and joint pain.   Skin: Negative for itching and rash.   Neurological: Positive for dizziness and weakness. Negative for tingling, sensory change, focal weakness, loss of consciousness and headaches.       Consultants/Specialty  Cardiology     Disposition  Inpatient, patient will most likely be discharged home after his condition improves     Quality Measures  EKG reviewed, Labs reviewed and Medications reviewed  Yeager catheter: No Yeager      DVT Prophylaxis: Enoxaparin (Lovenox)    Ulcer prophylaxis: Yes               Filed Vitals:    01/20/17 2000 01/21/17 0000 01/21/17 0500 01/21/17 0800   BP: 120/81 130/80 134/79 121/79   Pulse: 73 72 62 72   Temp: 35.6 °C (96 °F) 36.6 °C (97.8 °F) 36.4 °C (97.5 °F) 35.9 °C (96.7 °F)   Resp: 18 18 18 20   Height:       Weight: 87.4 kg (192 lb 10.9 oz)      SpO2: 98% 93% 94% 92%     Body mass index is 26.13 kg/(m^2). Weight: 87.4 kg (192 lb 10.9 oz)  Oxygen Therapy:  Pulse Oximetry: 92 %, O2 (LPM): 3, O2 Delivery: Silicone Nasal Cannula    Physical Exam   Constitutional: He is oriented to person, place, and time and well-developed, well-nourished, and in no distress. No distress.   HENT:   Head: Normocephalic and atraumatic.   Mouth/Throat: Oropharynx is clear and moist.   Eyes: Conjunctivae are normal. Pupils are equal, round, and reactive to light. No scleral icterus.   Neck: Normal range of motion. Neck supple. No JVD present. No tracheal deviation present. No thyromegaly present.   Cardiovascular: Normal rate, regular rhythm and normal heart sounds.  Exam reveals no gallop and no friction rub.    Pulmonary/Chest: Effort normal. He has no wheezes. He has no rales.    Diminished breath sounds bibasilar, crackles present mainly on L side    Abdominal: Soft. Bowel sounds are normal. He exhibits no distension. There is no tenderness. There is no rebound and no guarding.   Musculoskeletal: He exhibits edema.   +1 bilateral pitting pedal edema   Neurological: He is alert and oriented to person, place, and time. No cranial nerve deficit.   Skin: Skin is warm and dry. No rash noted. He is not diaphoretic. No erythema.         Lab Data Review:      1/19/2017  1:45 PM    Recent Labs      01/18/17 1705 01/19/17 0243 01/20/17 0205  01/21/17   0352   SODIUM  136  137  141  139   POTASSIUM  4.0  3.9  3.7  3.3*   CHLORIDE  108  106  107  104   CO2  19*  25  26  27   BUN  25*  23*  22  22   CREATININE  0.85  0.85  0.86  0.82   MAGNESIUM  1.9  1.9   --   1.4*   CALCIUM  8.3*  8.2*  8.4*  8.6       Recent Labs      01/18/17 1705 01/19/17 0243  01/20/17   0205  01/21/17   0352   ALTSGPT  44  41   --    --    ASTSGOT  20  22   --    --    ALKPHOSPHAT  139*  139*   --    --    TBILIRUBIN  0.7  0.6   --    --    GLUCOSE  556*  453*  251*  102*       Recent Labs      01/18/17 1705 01/19/17   0243   RBC  4.41*  4.08*   HEMOGLOBIN  11.6*  11.2*   HEMATOCRIT  38.6*  35.5*   PLATELETCT  184  150*   IRON   --   38*   FERRITIN   --   767.5*   TOTIRONBC   --   214*       Recent Labs      01/18/17 1705 01/19/17   0243   WBC  6.8  7.2   NEUTSPOLYS  65.00  47.00   LYMPHOCYTES  20.00*  38.00   MONOCYTES  5.00  11.00   EOSINOPHILS  0.00  4.00   BASOPHILS  0.00  0.00   ASTSGOT  20  22   ALTSGPT  44  41   ALKPHOSPHAT  139*  139*   TBILIRUBIN  0.7  0.6           Assessment/Plan     * Acute on chronic combined systolic and diastolic congestive heart failure (CMS-HCC)  Assessment & Plan  Patient presented with SOB and orthopnea, associated with productive cough   He has history of ischemic cardiomyopathy s/p 4 vsl CABG in 10/2016 with last echo showing EF of 35% Grade 3 diastolic dysfunction  (12/2016)  Exam revealed bilateral leg edema with bilateral crackles up to on third of lungs on admission  Labs showed BNP 2349, with negative trop x3 and non specific T wave changes on EKG (1/18)  CXR showed increased pulmonary edema with mild - mod bilateral pleural effusions (1/18)  Suspect CHF exacerbation due to inaccessibility to medications secondary to financial hardships   Patient was restarted on aspirin, plavix, coreg, lisinopril on admission  Cardiologist was consulted and recommended against repeating echo or doing stress test  Discontinue plavix (per cards rec's) to reduce cost of medications  Cardiology onboard, appreciate rec's  Plan  - Continue aspirin 81 MG and lisinopril 10 MG  - Continue coreg to 6.25 MG BID  - Continue atorvastatin 40 MG and lisinopril 25 MG  - Continue IV lasix 40 MG BID  - F/U in 1 week after discharge     Hypoxia and lactic acidosis  Assessment & Plan  - supposed to be on home O2 which he could not get. He will get oxygen concentrator this time so that he does not need to replete again.   - lactic acidosis: HCO3 19, LA 2.9 trended down to 2.1.   - received Unasyn and azithro, blood Cx, UA (neg for infection), Ur Cx at ER.   - will hold Ab, WBC normal, afebrile, dry cough with viral URI symptoms. His hypoxia is more likely from pulmonary edema secondary to CHF leading decreased tissue perfusion and lactic acidosis. Follow lactic acid and monitor. (did have history of recurrent pneumonias)    S/P CABG (coronary artery bypass graft)  Assessment & Plan  S/p 4 vessels CABG in 10/2016  Plan  - Continue aspirin, lisinopril and coreg  - No need for stress test      Pleural effusion  Assessment & Plan  Hisotry of ecurrent pleural effusion with L side pleurodesis   CXR showed mild pleural effusion more evident on L side (1/18)  Most likely related to decompensated CHF  Plan  - Continue cardiac treatment for CHF  - Repeat CXR shows improvement in pulmonary edema and shrinking left  pulmonary effusion    Ischemic cardiomyopathy  Assessment & Plan  S/p 4 vessels CABG in 10/2016 with last echo showing EF of 35% Grade 3 diastolic dysfunction (12/2016)  Plan  See CHF section        Acute respiratory failure with hypoxia (CMS-HCC)  Assessment & Plan  Supposed to be on home oxygen, didn't get that set up due financial hardships.   Consider getting concentrator this time to avoid repletion issues   Hypoxic respiratory failure most likely related to acute CHF exacerbation, less likely to be related to PE or ongoing infection   Plan  - Continue cardiac treatment for CHF  - Continue oxygen therapy and watch for any sign of infection      Financial difficulties  Assessment & Plan  Patient has financial hardships and can't get his disability checks to obtain his medications  Per patient, he is supposed to meet with [facility] for his prescription   Plan  - Will provide patient with 1 month supply of medications on discharge  - Encourage patient to follow up with his disability checks  - Switched his medications to generic ones      IDDM (insulin dependent diabetes mellitus) (CMS-HCC)  Assessment & Plan  On metformin 500 bid, glargine 10 U QD at home  Last Hb A1 C 6.1 (12/2016), blood glucose 556 on admission due medication non compliance   Blood sugar running less than 200s, controlled  Fasting level was low at 90s, will decrease basal insulin level   Plan  - Decrease insulin glargine to 17 units   - Continue ISS and hypoglycemia protocol   - Consider restarting metformin as patient will not be able to afford insulin after discharge     Normocytic anemia  Assessment & Plan  Iron deficiency + anemia of chronic disease. (Low iron, low TIBC, normal saturation, normal B12, folate, Hx of GI Bleed)  Plan  - Continue Fe supplements.  - ctm

## 2017-01-21 NOTE — ASSESSMENT & PLAN NOTE
S/p 4 vessels CABG in 10/2016 with last echo showing EF of 35% Grade 3 diastolic dysfunction (12/2016)  Plan  See CHF section

## 2017-01-21 NOTE — PROGRESS NOTES
This RN received report and assumed care of this patient. Patient does not complain of pain at this time. Alert and oriented, assessment complete and VSS. Discussed plan of care with patient and answered all questions at this time. Fall precautions in place and pt agreeable. Will continue to monitor and assess.

## 2017-01-22 NOTE — CARE PLAN
Problem: Communication  Goal: The ability to communicate needs accurately and effectively will improve  Outcome: PROGRESSING AS EXPECTED    Problem: Safety  Goal: Will remain free from injury  Outcome: PROGRESSING AS EXPECTED  Patient calls appropriately and uses call light. Bed alarm on, strip alarm on and working.     Problem: Respiratory:  Goal: Respiratory status will improve  Outcome: PROGRESSING AS EXPECTED  Patient uses suction to assist in coughing up sputum and breathing better. Patient states its a tan colored sputum.

## 2017-01-22 NOTE — PROGRESS NOTES
Handoff received. Px: CHF exac/acute SHF and hypoxic resp fail. Medical non-compliance d/t financials. DM poorly controlled by insulin and should confirm switch to Metformin. PO Lasix 40 mg BID. May need walking pulse ox for home O2 orders. Has new productive cough w/ tan phlegm, per PM RN. Close to DC. Bed low and locked, alarm on, call bell in reach. SR on tele.

## 2017-01-23 NOTE — CARE PLAN
Problem: Safety  Goal: Will remain free from falls  Outcome: MET Date Met:  01/22/17  Pt shown to be able to ambulate within expected boundaries without putting self at risk.    Problem: Bowel/Gastric:  Goal: Normal bowel function is maintained or improved  Outcome: PROGRESSING SLOWER THAN EXPECTED  Pt able to pass a BM this evening.

## 2017-01-23 NOTE — PROGRESS NOTES
[facility] Internal Medicine Interval Note    Name Abner Torres     1960   Age/Sex 56 y.o. male   MRN [id number]   Code Status Full     After 5PM or if no immediate response to page, please call for cross-coverage  Attending/Team: Dr. Shook/ JAQUI Hitchcock  Call [phone] to page   1st Call - Day Intern (R1):   Dr. Mack  2nd Call - Day Sr. Resident (R2/R3):   Dr. Parker          Chief complaint/ reason for interval visit (Primary Diagnosis)   Dizziness, cough and SOB x 1 day     ID: Mr. Torres is a 56 year old male with a PMHx of DM2, HTN and CAD s/p CABG in 2016. He presented to the ED with dizziness, cough and SOB x 1 day. He was recently released from rehabilitation center and states he has not taken any of his prescribed medications  including his insulin since his release 2 weeks ago due to lack of money to purchase them and miscommunication regarding what medications he was supposed to continue.       Patient has no complaints this morning aside from continued difficulty laying on back and sleeping due to cough and SOB. Repeat CXR shows improvement in pulmonary edema and shrinking of left pulmonary effusion. Patient denies any chest pain, palpitations, fevers or general malaise.     Interval Problem Daily Status Update    Active Hospital Problems    Diagnosis   • *Acute on chronic combined systolic and diastolic congestive heart failure (CMS-[facility], located [facility]) [I50.43]   • Normocytic anemia [D64.9]   • IDDM (insulin dependent diabetes mellitus) (CMS-[facility]) [E11.9, Z79.4]   • Ischemic cardiomyopathy [I25.5]   • Acute respiratory failure with hypoxia (CMS-[facility]) [J96.01]   • Financial difficulties [Z59.8]   • S/P CABG (coronary artery bypass graft) [Z95.1]   • Pleural effusion [J90]       ROS  Constitutional: Negative for fever, chills, weight loss, malaise/fatigue and diaphoresis.   HENT: Negative for congestion and sore throat.    Eyes: Negative for blurred vision.   Respiratory:  Positive for cough and shortness of breath. Negative for hemoptysis, sputum production and wheezing.    Cardiovascular: Positive for orthopnea and leg swelling. Negative for chest pain and palpitations.   Gastrointestinal: Negative for nausea, vomiting, abdominal pain, diarrhea and constipation.   Genitourinary: Negative for dysuria, urgency, frequency and hematuria.   Musculoskeletal: Negative for myalgias.   Skin: Negative for itching and rash.   Neurological: Negative for dizziness, focal weakness and headaches.       Consultants/Specialty  Cardiology     Disposition  Inpatient, possible discharge tomorrow     Quality Measures  EKG reviewed, Labs reviewed and Medications reviewed  Yeager catheter: No Yeager      DVT Prophylaxis: Enoxaparin (Lovenox)    Ulcer prophylaxis: Yes               Filed Vitals:    01/23/17 0000 01/23/17 0400 01/23/17 0800 01/23/17 1200   BP: 124/75 115/69 125/67 120/70   Pulse: 70 73 69 71   Temp: 36.8 °C (98.2 °F) 36.3 °C (97.3 °F) 36.5 °C (97.7 °F) 36.7 °C (98.1 °F)   Resp: 18 18 19 18   Height:       Weight:       SpO2: 100% 96% 96% 100%     Body mass index is 26.01 kg/(m^2). Weight: 87 kg (191 lb 12.8 oz)  Oxygen Therapy:  Pulse Oximetry: 100 %, O2 (LPM): 3, O2 Delivery: Silicone Nasal Cannula    Physical Exam   Constitutional: He is oriented to person, place, and time and well-developed, well-nourished, and in no distress. No distress.   HENT:   Head: Normocephalic and atraumatic.   Mouth/Throat: No oropharyngeal exudate.   Eyes: Conjunctivae are normal. Pupils are equal, round, and reactive to light. No scleral icterus.   Neck: Normal range of motion. Neck supple. No JVD present. No thyromegaly present.   Cardiovascular: Normal rate and regular rhythm.  Exam reveals gallop. Exam reveals no friction rub.    Murmur heard.  Pulmonary/Chest: Effort normal. No respiratory distress.   Rhonchi present in LLL    Abdominal: Soft. Bowel sounds are normal. He exhibits no distension. There is no  tenderness.   Musculoskeletal: Normal range of motion. He exhibits no tenderness.   1+ edema in bilateral LE   Lymphadenopathy:     He has no cervical adenopathy.   Neurological: He is alert and oriented to person, place, and time.   Skin: Skin is warm and dry. No rash noted. He is not diaphoretic. No erythema. No pallor.         Lab Data Review:      1/23/2017  2:23 PM    Recent Labs      01/21/17 0352 01/23/17 0341   SODIUM  139  136   POTASSIUM  3.3*  3.8   CHLORIDE  104  103   CO2  27  27   BUN  22  23*   CREATININE  0.82  0.68   MAGNESIUM  1.4*   --    CALCIUM  8.6  8.6       Recent Labs      01/21/17 0352 01/23/17 0341   ALTSGPT   --   15   ASTSGOT   --   8*   ALKPHOSPHAT   --   79   TBILIRUBIN   --   0.8   GLUCOSE  102*  271*       Recent Labs      01/23/17 0342   RBC  4.11*   HEMOGLOBIN  10.8*   HEMATOCRIT  35.3*   PLATELETCT  214       Recent Labs      01/23/17 0341 01/23/17 0342   WBC   --   8.9   NEUTSPOLYS   --   64.30   LYMPHOCYTES   --   21.10*   MONOCYTES   --   11.20   EOSINOPHILS   --   2.90   BASOPHILS   --   0.20   ASTSGOT  8*   --    ALTSGPT  15   --    ALKPHOSPHAT  79   --    TBILIRUBIN  0.8   --            Assessment/Plan     # Acute on chronic combined systolic and diastolic congestive heart failure (CMS-HCC)  Assessment & Plan  Patient presented with SOB and orthopnea, associated with productive cough   He has history of ischemic cardiomyopathy s/p 4 vsl CABG in 10/2016 with last echo showing EF of 35% Grade 3 diastolic dysfunction (12/2016)  Exam revealed bilateral leg edema with bilateral crackles up to on third of lungs on admission  Labs showed BNP 2349, with negative trop x3 and non specific T wave changes on EKG (1/18)  CXR showed increased pulmonary edema with mild - mod bilateral pleural effusions (1/18)  Suspect CHF exacerbation due to inaccessibility to medications secondary to financial hardships   Patient was restarted on aspirin, plavix, coreg, lisinopril on  admission  Cardiologist was consulted and recommended against repeating echo or doing stress test  Discontinue plavix (per cards rec's) to reduce cost of medications  Cardiology onboard, appreciate rec's  Plan  - Continue aspirin 81 MG and lisinopril 10 MG  - Continue coreg to 6.25 MG BID  - Continue atorvastatin 40 MG and lisinopril 25 MG  - Continue IV lasix 40 MG BID  - F/U in 1 week after discharge per cardio   - 1/22: unable to discern lung crackles, 3+ leg b/l leg edema     MATHEW hose ordered    Advise to ambulate as tolerated   - 1/23: rhonchi present in LLL, repeat CXR shows improvement of pulmonary effusion. Pt remains afebrile, cough is non productive, no elevated WBC or left shift.    Advise pt with ambulate with pulse ox   Possible D/C tomorrow     # Hypoxia and lactic acidosis  Assessment & Plan  - supposed to be on home O2 which he could not get. He will get oxygen concentrator this time so that he does not need to replete again.   - lactic acidosis: HCO3 19, LA 2.9 trended down to 2.1.   - received Unasyn and azithro, blood Cx, UA (neg for infection), Ur Cx at ER.   - will hold Ab, WBC normal, afebrile, dry cough with viral URI symptoms. His hypoxia is more likely from pulmonary edema secondary to CHF leading decreased tissue perfusion and lactic acidosis. Follow lactic acid and monitor. (did have history of recurrent pneumonias)  - still requiring 3L of O2, patient supposed to be on home O2 but unsure of how much. Will work to ambulate patient today and determine O2 needs for D/C    # S/P CABG (coronary artery bypass graft)  Assessment & Plan  S/p 4 vessels CABG in 10/2016  Plan  - Continue aspirin, lisinopril and coreg  - No need for stress test per cards  - 1 week F/U with cards upon discharge     # Pleural effusion  Assessment & Plan  Hisotry of ecurrent pleural effusion with L side pleurodesis   CXR showed mild pleural effusion more evident on L side (1/18)  Most likely related to decompensated  CHF  Plan  - Continue cardiac treatment for CHF  - Repeat CXR shows improvement in pulmonary edema and shrinking left pulmonary effusion    # Ischemic cardiomyopathy  Assessment & Plan  S/p 4 vessels CABG in 10/2016 with last echo showing EF of 35% Grade 3 diastolic dysfunction (12/2016)  Plan  See CHF section        Acute respiratory failure with hypoxia (CMS-HCC)  Assessment & Plan  Supposed to be on home oxygen, didn't get that set up due financial hardships.   Consider getting concentrator this time to avoid repletion issues   Hypoxic respiratory failure most likely related to acute CHF exacerbation, less likely to be related to PE or ongoing infection   Plan  - Continue cardiac treatment for CHF  - Continue oxygen therapy and watch for any sign of infection      # Financial difficulties  Assessment & Plan  Patient has financial hardships and can't get his disability checks to obtain his medications  Per patient, he is supposed to meet with [facility] for [facility] prescription   Plan  - Will provide patient with 1 month supply of medications on discharge  - Encourage patient to follow up with his disability checks  - Switched his medications to generic ones      # IDDM (insulin dependent diabetes mellitus) (CMS-HCC)  Assessment & Plan  On metformin 500 bid, glargine 10 U QD at home  Last Hb A1 C 6.1 (12/2016), blood glucose 556 on admission due medication non compliance   Blood sugar running less than 200s, controlled  Fasting level was low at 90s, will decrease basal insulin level   Plan  - Decrease insulin glargine to 17 units   - Continue ISS and hypoglycemia protocol   - Consider restarting metformin as patient will not be able to afford insulin after discharge     # Normocytic anemia  Assessment & Plan  Iron deficiency + anemia of chronic disease. (Low iron, low TIBC, normal saturation, normal B12, folate, Hx of GI Bleed)  - 1/23: H/H 10.8/35.3, stable   Plan  - Continue Fe supplements.  - ctm

## 2017-01-23 NOTE — PROGRESS NOTES
[facility] Internal Medicine Interval Note    Name Abner Torres     1960   Age/Sex 56 y.o. male   MRN [id number]   Code Status Full      After 5PM or if no immediate response to page, please call for cross-coverage  Attending/Team: [place] / JAQUI Hitchcock  Call [phone] to page   1st Call - Day Intern (R1):   Dr. Olguin 2nd Call - Day Sr. Resident (R2/R3):   Dr. Parker         Chief complaint/ reason for interval visit (Primary Diagnosis)   Dizziness, Cough and SOB x 1 day     ID: Mr. Torres is a 56 year old male with a PMHx of DM2, HTN and CAD s/p CABG in 2016. He presented to the ED with dizziness, cough and SOB x 1 day. He was recently released from rehabilitation center and states he has not taken any of his prescribed medications  including his insulin since his release 2 weeks ago due to lack of money to purchase them and miscommunication regarding what medications he was supposed to continue.     Patient is doing well this morning, sitting in chair in NAD, still complains of SOB and cough while lying down, he feels that his condition is improving, CXR showed improvement of pulmonary edema and effusion, will get CT chest to exclude interstitial lung disease    Interval Problem Daily Status Update    Active Hospital Problems    Diagnosis   • *Acute exacerbation of CHF (congestive heart failure) (CMS-HCC) [I50.9]   • Hypoxia and lactic acidosis [R09.02]   • Normocytic anemia [D64.9]   • IDDM (insulin dependent diabetes mellitus) (CMS-HCC) [E11.9, Z79.4]   • S/P CABG (coronary artery bypass graft) [Z95.1]   • Pleural effusion [J90]       Review of Systems   Constitutional: Negative for fever, chills and diaphoresis.   HENT: Negative for congestion and sore throat.    Eyes: Negative for blurred vision.   Respiratory: Positive for cough and shortness of breath. Negative for hemoptysis.    Cardiovascular: Positive for orthopnea and leg swelling. Negative for chest pain and  palpitations.   Gastrointestinal: Negative for nausea, vomiting, abdominal pain, diarrhea, constipation and blood in stool.   Genitourinary: Negative for dysuria, urgency and frequency.   Musculoskeletal: Negative for myalgias and joint pain.   Skin: Negative for itching and rash.   Neurological: Negative for dizziness, tingling, sensory change, focal weakness, loss of consciousness, weakness and headaches.       Consultants/Specialty  Cardiology     Disposition  Inpatient, patient will most likely be discharged home after his condition improves     Quality Measures  EKG reviewed, Labs reviewed and Medications reviewed  Yeager catheter: No Yeager      DVT Prophylaxis: Enoxaparin (Lovenox)    Ulcer prophylaxis: Yes               Filed Vitals:    01/23/17 0000 01/23/17 0400 01/23/17 0800 01/23/17 1200   BP: 124/75 115/69 125/67 120/70   Pulse: 70 73 69 71   Temp: 36.8 °C (98.2 °F) 36.3 °C (97.3 °F) 36.5 °C (97.7 °F) 36.7 °C (98.1 °F)   Resp: 18 18 19 18   Height:       Weight:       SpO2: 100% 96% 96% 100%     Body mass index is 26.01 kg/(m^2). Weight: 87 kg (191 lb 12.8 oz)  Oxygen Therapy:  Pulse Oximetry: 100 %, O2 (LPM): 3, O2 Delivery: Silicone Nasal Cannula    Physical Exam   Constitutional: He is oriented to person, place, and time and well-developed, well-nourished, and in no distress. No distress.   HENT:   Head: Normocephalic and atraumatic.   Mouth/Throat: Oropharynx is clear and moist.   Eyes: Conjunctivae are normal. Pupils are equal, round, and reactive to light. No scleral icterus.   Neck: Normal range of motion. Neck supple. No JVD present. No tracheal deviation present. No thyromegaly present.   Cardiovascular: Normal rate, regular rhythm and normal heart sounds.  Exam reveals no gallop and no friction rub.    Pulmonary/Chest: Effort normal. He has no wheezes. He has no rales.   Diminished breath sounds bibasilar, crackles present  on L base   Abdominal: Soft. Bowel sounds are normal. He exhibits no  distension. There is no tenderness. There is no rebound and no guarding.   Musculoskeletal: He exhibits edema.   +3 bilateral pitting pedal edema   Neurological: He is alert and oriented to person, place, and time. No cranial nerve deficit.   Skin: Skin is warm and dry. No rash noted. He is not diaphoretic. No erythema.         Lab Data Review:      1/19/2017  1:45 PM    Recent Labs      01/21/17 0352  01/23/17 0341   SODIUM  139  136   POTASSIUM  3.3*  3.8   CHLORIDE  104  103   CO2  27  27   BUN  22  23*   CREATININE  0.82  0.68   MAGNESIUM  1.4*   --    CALCIUM  8.6  8.6       Recent Labs      01/21/17 0352 01/23/17 0341   ALTSGPT   --   15   ASTSGOT   --   8*   ALKPHOSPHAT   --   79   TBILIRUBIN   --   0.8   GLUCOSE  102*  271*       Recent Labs      01/23/17 0342   RBC  4.11*   HEMOGLOBIN  10.8*   HEMATOCRIT  35.3*   PLATELETCT  214       Recent Labs      01/23/17 0341 01/23/17 0342   WBC   --   8.9   NEUTSPOLYS   --   64.30   LYMPHOCYTES   --   21.10*   MONOCYTES   --   11.20   EOSINOPHILS   --   2.90   BASOPHILS   --   0.20   ASTSGOT  8*   --    ALTSGPT  15   --    ALKPHOSPHAT  79   --    TBILIRUBIN  0.8   --            Assessment/Plan     * Acute on chronic combined systolic and diastolic congestive heart failure (CMS-HCC)  Assessment & Plan  Patient presented with SOB and orthopnea, associated with productive cough   He has history of ischemic cardiomyopathy s/p 4 vsl CABG in 10/2016 with last echo showing EF of 35% Grade 3 diastolic dysfunction (12/2016)  Exam revealed bilateral leg edema with bilateral crackles up to on third of lungs on admission  Labs showed BNP 2349, with negative trop x3 and non specific T wave changes on EKG (1/18)  CXR showed increased pulmonary edema with mild - mod bilateral pleural effusions (1/18)  Suspect CHF exacerbation due to inaccessibility to medications secondary to financial hardships   Patient was restarted on aspirin, plavix, coreg, lisinopril on  admission  Cardiologist was consulted and recommended against repeating echo or doing stress test  Discontinue plavix (per cards rec's) to reduce cost of medications  Plan  - Continue aspirin 81 MG and lisinopril 10 MG  - Continue coreg to 6.25 MG BID  - Continue atorvastatin 40 MG and lisinopril 25 MG  - Continue IV lasix 40 MG BID  - Continue MATHEW hose  - F/U in 1 week after discharge per cardio     Hypoxia and lactic acidosis  Assessment & Plan  - supposed to be on home O2 which he could not get. He will get oxygen concentrator this time so that he does not need to replete again.   - lactic acidosis: HCO3 19, LA 2.9 trended down to 2.1.   - received Unasyn and azithro, blood Cx, UA (neg for infection), Ur Cx at ER.   - will hold Ab, WBC normal, afebrile, dry cough with viral URI symptoms. His hypoxia is more likely from pulmonary edema secondary to CHF leading decreased tissue perfusion and lactic acidosis. Follow lactic acid and monitor. (did have history of recurrent pneumonias)    S/P CABG (coronary artery bypass graft)  Assessment & Plan  S/p 4 vessels CABG in 10/2016  Plan  - Continue aspirin, lisinopril and coreg  - No need for stress test       Pleural effusion  Assessment & Plan  Hisotry of ecurrent pleural effusion with L side pleurodesis   CXR showed mild pleural effusion more evident on L side (1/18)  Most likely related to decompensated CHF   Plan  - Continue cardiac treatment for CHF  - Repeat CXR shows improvement in pulmonary edema and shrinking left pulmonary effusion    Ischemic cardiomyopathy  Assessment & Plan  S/p 4 vessels CABG in 10/2016 with last echo showing EF of 35% Grade 3 diastolic dysfunction (12/2016)  Plan  See CHF section         Acute respiratory failure with hypoxia (CMS-[facility])  Assessment & Plan  Supposed to be on home oxygen, didn't get that set up due financial hardships.   Consider getting concentrator this time to avoid repletion issues   Hypoxic respiratory failure most likely  related to acute CHF exacerbation, less likely to be related to PE or ongoing infection   His condition is slowly improving while on lasix therapy, will get CT chest to exclude possible interstitial lung disease   Plan  - Continue cardiac treatment for CHF  - Ordered CT chest w/o con  - Continue oxygen therapy and watch for any sign of infection      Financial difficulties  Assessment & Plan  Patient has financial hardships and can't get his disability checks to obtain his medications  Per patient, he is supposed to meet with [facility] for [facility] prescription   Plan  - Will provide patient with 1 month supply of medications on discharge  - Encourage patient to follow up with his disability checks  - Switched his medications to generic ones       IDDM (insulin dependent diabetes mellitus) (CMS-[facility])  Assessment & Plan  On metformin 500 bid, glargine 10 U QD at home  Last Hb A1 C 6.1 (12/2016), blood glucose 556 on admission due medication non compliance   Blood sugar running around 200s  Plan  - Continue insulin glargine 17 units   - Increase ISS and continue hypoglycemia protocol   - Consider restarting metformin as patient will not be able to afford insulin after discharge     Normocytic anemia  Assessment & Plan  Iron deficiency + anemia of chronic disease. (Low iron, low TIBC, normal saturation, normal B12, folate, Hx of GI Bleed)  Plan  - Continue Fe supplements.

## 2017-01-23 NOTE — FACE TO FACE
Face to Face Note  -  Durable Medical Equipment    Valente Olguin M.D. - NPI: [id number]  I certify that this patient is under my care and that they had a durable medical equipment(DME)face to face encounter by myself that meets the physician DME face-to-face encounter requirements with this patient on:    Date of encounter:   Patient:                    MRN:                       YOB: 2016  Abner Torres  [id number]  1960     The encounter with the patient was in whole, or in part, for the following medical condition, which is the primary reason for durable medical equipment:  CHF    I certify that, based on my findings, the following durable medical equipment is medically necessary:  Oxygen.    HOME O2 Saturation Measurements:(Values must be present for Home Oxygen orders)         ,     ,         My Clinical findings support the need for the above equipment due to:  Hypoxia    Supporting Symptoms: SOB and cough

## 2017-01-23 NOTE — PROGRESS NOTES
Assumed care of patient and report received from RN; Patient is alert and oriented x 4; Patient is seated in chair with not complaints of pain or distress stated; Communication board updated; Safety precautions in place; All questions and concerns addressed; will continue to monitor.

## 2017-01-23 NOTE — DISCHARGE PLANNING
Care Transition Team Discharge Planning    Anticipated Discharge Information  Anticipated discharge disposition: Home  Discharge Address: [address] [place] [address]  Discharge Contact Phone Number: [phone]              Discharge Plan:  Pt will be requiring home oxygen at discharge, but orders have not been put in yet. Pt states his co-pays have been too high and he cannot afford his medications. To prevent readmission, medication assistance may need to be explored.

## 2017-01-24 NOTE — PROGRESS NOTES
Assumed care of patient and report received from RN; Patient is asleep with no signs of pain or distress; Communication board updated; Safety precautions in place; All questions and concerns addressed; will continue to monitor.

## 2017-01-24 NOTE — TELEPHONE ENCOUNTER
Patient's voicemail is full, Letter and NP packet mailed to patient's home. Please transfer call to [phone].

## 2017-01-24 NOTE — CARE PLAN
Problem: Safety  Goal: Will remain free from injury  Outcome: PROGRESSING AS EXPECTED  Pt educated on the importance of the call light as well as wearing treaded socks for ambulating. Bed locked and in lowest position. Personal belongings within reach.     Problem: Knowledge Deficit  Goal: Knowledge of disease process/condition, treatment plan, diagnostic tests, and medications will improve  Outcome: PROGRESSING AS EXPECTED  Intervention: Assess knowledge level of disease process/condition, treatment plan, diagnostic tests, and medications  All questions and concerns in regards to POC answered to the best of my ability. Pt provided the appropriate learning material and verbalizes understanding.

## 2017-01-24 NOTE — Clinical Note
January 24, 2017        Abner Torres  [address]  [facility] [address]        Dear Abner:    Rosalva Torres, we look forward to seeing you at your appointment at [address].  Included in this letter is your new patient paperwork.  If you prefer to arrive 15 minutes prior to your scheduled appointment time, please complete the attached paperwork in advance.  If not, we request that you arrive 30 minutes prior to your appointment to allow enough time to complete the paperwork in the office.  Please bring with you, your medication bottles including over the counter. Also bring a list of all providers you see including your previous primary care provider.    We still need to discuss this appointment with you, please call our office at [phone]    Your appointment is scheduled for:  1/31/17 AT 120PM    Scheduled with:  FRANCISCO ROOT    Patients arriving late to their appointment will, unfortunately, need to be rescheduled.    If you have scheduling questions or need to make changes to your appointment, please call [phone].     Best Regards,      [facility]

## 2017-01-24 NOTE — CARE PLAN
Problem: Communication  Goal: The ability to communicate needs accurately and effectively will improve  Intervention: Educate patient and significant other/support system about the plan of care, procedures, treatments, medications and allow for questions  Discuss plan of care. Address concerns and questions.       Problem: Mobility  Goal: Risk for activity intolerance will decrease  Intervention: Assess and monitor signs of activity intolerance  Educate pt to call for assistance and safety precautions.

## 2017-01-24 NOTE — PROGRESS NOTES
Received report. Assessed pt. Discussed plan of care. Pt resting in bed. AOx4 Call light in reach. Fall precautions in place. Bed in lowest position, bed locked, RN and CNA numbers provided. Provided water. Hourly rounding in practice.

## 2017-01-24 NOTE — DISCHARGE SUMMARY
[facility] Internal Medicine Discharge Summary      Admit Date:  1/18/2017       Discharge Date:   1/25/2017    Service:   [facility] Internal Medicine Gray Team  Attending Physician(s):   Dr. Boone Renteria       Senior Resident(s):   Dr. Keith Iqbal  Keith Resident(s):   Dr. Olguin      Primary Diagnosis:   Acute exacerbation of systolic congestive heart failure     Secondary Diagnoses:                Active Hospital Problems    Diagnosis   • Normocytic anemia [D64.9]   • IDDM (insulin dependent diabetes mellitus) (CMS-HCC) [E11.9, Z79.4]   • Ischemic cardiomyopathy [I25.5]   • Acute respiratory failure with hypoxia (CMS-HCC) [J96.01]   • Financial difficulties [Z59.8]   • S/P CABG (coronary artery bypass graft) [Z95.1]   • Pleural effusion [J90]       Hospital Summary (Brief Narrative):       56 years old male with past medical history of coronary arteries disease, Ventricular tachycardia sp 4 vessels CABG in 10/2016, recurrent bilateral pleural effusions with multiple thoracocentesis sp left pleurodesis in 12/2016, insulin dependent DM, provoked DVT s/p IVC filter placement, recurrent pneumonias came into ER due to cough and increased SOB productive cough after recent discharge due medication non compliance secondary to financial hardships. Patient was admitted, started on oxygen therapy with intravenous lasix therapy. His home medications were resumed including aspirin, rosuvastatin, lisinopril and coreg. Cardiology was consulted, added spironolactone and switched rosuvastatin to atorvastatin to reduce cost of medications    Patient /Hospital Summary (Details -- Problem Oriented) :          Acute on chronic combined systolic and diastolic congestive heart failure due ischemic cardiomyopathy   Assessment & Plan   He has history of ischemic cardiomyopathy s/p CABG presented with acute exacerbation of congestive heart failure due medications non-compliance secondary to financial hardships. Patient was  started on aspirin, atorvastatin, lisinopril and coreg, in addition to IV lasix, his condition improved and he was able to be titrated off oxygen and was able to sleep at night. Patient was discharge on 3 months supply of cardiac medications, instructed to follow up with scheduled PCP and outpatient cardiologist appointments in 1 week after discharge     Hypoxia and lactic acidosis  Assessment & Plan   Patient presented with hypoxia related to pulmonary edema and lactic acidosis, his condition improved and lactic acid trended down./ Patient was assessed prior to admission and he didn't requires any oxygen therapy at home    S/P CABG (coronary artery bypass graft)  Assessment & Plan  S/p 4 vessels CABG in 10/2016, patient was continued on aspirin, lisinopril and coreg. Outpatient follow up with cardiologist is recommended     Pleural effusion  Assessment & Plan  Hisotry of ecurrent pleural effusion s/p L side pleurodesis, CXR showed mild pleural effusion more evident on L side that improved with diuretic therapy. Patient was instructed to continue his current medications on discharge and to follow up with outpatient cardiologist for further management      Acute respiratory failure with hypoxia (CMS-[facility])  Assessment & Plan  Patient presented with acute hypoxic respiratory failure due pulmonary edema and pleural effusion secondary to acute exacerbation of his heart failure. He was stared on oxygen therapy that was titrated off as his condition improved with diuretic therapy     Financial difficulties  Assessment & Plan  Patient has financial hardships and can't get his disability checks to obtain his medications  Per patient, he is supposed to meet with [facility] prescription   Plan  - Will provide patient with 1 month supply of medications on discharge  - Encourage patient to follow up with his disability checks  - Switched his medications to generic ones       IDDM (insulin dependent diabetes mellitus)  (CMS-[facility])  Assessment & Plan  Controlled on insulin glargine 17 units during hospital stay. Patient was discharged on insulin therapy for further management     Normocytic anemia  Assessment & Plan  Combination of iron deficiency and anemia of chronic disease, managed with oral iron supplements    Consultants:     Cardiology    Procedures:        none    Imaging/ Testing:      CT-CHEST (THORAX) W/O   Final Result      1.  Bilateral pleural effusions are smaller in total volume than on the prior CT. Right pleural effusion remains larger than left pleural effusion but is less loculated.      2.  Residual loculated left pleural effusion is noted in the lower left hemithorax and posteriorly and medially in the left hemithorax.      3.  Consolidation and volume loss noted in the lower pulmonary lobes bilaterally.      4.  Micronodular pattern laterally in the left upper pulmonary lobe is again noted which is less prominent on the current exam than on the prior exam. Some resolution of inflammatory process is possible.      5.  Confluent opacifications in the upper lobes bilaterally seen on the prior CT are no longer present.      6.  Small pericardial fluid collection is again present.               DX-CHEST-2 VIEWS   Final Result      1.  Small bilateral pleural effusions, larger on the left side.      2.  Consolidative atelectasis versus infiltrate such as pneumonia in the left lung base.      3.  Lungs are more expanded. Perihilar edema appears decreased.      4.  No new infiltrates or consolidations.      DX-CHEST-LIMITED (1 VIEW)   Final Result      1.  Decreased pulmonary edema superimposed on atelectasis   2.  Small LEFT pleural effusion      DX-CHEST-PORTABLE (1 VIEW)   Final Result         1. Mild interstitial pulmonary edema.   2. Small to moderate bilateral pleural effusions, left more than right, with adjacent opacities, atelectasis or consolidation.            Discharge Medications:         Medication  Reconciliation: Completed     Medication List      START taking these medications       Instructions    aspirin 81 MG EC tablet   Last time this was given:  81 mg on 1/25/2017  8:12 AM    Take 1 Tab by mouth every day.   Dose:  81 mg       atorvastatin 40 MG Tabs   Last time this was given:  40 mg on 1/24/2017  8:34 PM   Commonly known as:  LIPITOR    Take 1 Tab by mouth every bedtime.   Dose:  40 mg       carvedilol 6.25 MG Tabs   Last time this was given:  6.25 mg on 1/25/2017  8:12 AM   Commonly known as:  COREG    Take 1 Tab by mouth 2 times a day, with meals.   Dose:  6.25 mg       ferrous sulfate 325 (65 FE) MG tablet   Last time this was given:  325 mg on 1/25/2017  8:12 AM    Take 1 Tab by mouth every morning with breakfast.   Dose:  325 mg       furosemide 40 MG Tabs   Last time this was given:  40 mg on 1/25/2017  5:37 AM   Commonly known as:  LASIX    Take 1 Tab by mouth every day.   Dose:  40 mg       insulin glargine 100 UNIT/ML Soln   Last time this was given:  17 Units on 1/24/2017  8:36 PM   Commonly known as:  LANTUS    Inject 17 Units as instructed every evening.   Dose:  17 Units       lisinopril 20 MG Tabs   Last time this was given:  20 mg on 1/25/2017  8:12 AM   Commonly known as:  PRINIVIL    Take 1 Tab by mouth every day.   Dose:  20 mg       omeprazole 20 MG delayed-release capsule   Last time this was given:  20 mg on 1/25/2017  8:12 AM   Commonly known as:  PRILOSEC    Take 1 Cap by mouth every day.   Dose:  20 mg       potassium chloride SA 10 MEQ Tbcr   Last time this was given:  20 mEq on 1/22/2017  7:30 AM   Commonly known as:  K-DUR    Take 1 Tab by mouth every day.   Dose:  10 mEq       spironolactone 25 MG Tabs   Last time this was given:  25 mg on 1/25/2017  8:12 AM   Commonly known as:  ALDACTONE    Take 1 Tab by mouth every day.   Dose:  25 mg               Disposition:   Patient will be discharged home    Diet:   Healthy diet     Activity:   As tolerated per patient      Instructions:      Follow up with PCP appointment in 1 week  Follow up with outpatient cardiology appointment on 1/30/2017  Continue to take home medications as instructed      The patient was instructed to return to the ER in the event of worsening symptoms. I have counseled the patient on the importance of compliance and the patient has agreed to proceed with all medical recommendations and follow up plan indicated above.   The patient understands that all medications come with benefits and risks. Risks may include permanent injury or death and these risks can be minimized with close reassessment and monitoring.        Primary Care Provider:       Copy of discharge summary given to the patient: Deferred    Follow up appointment details :      PCP appointment in 1 week  Cardiology appointment on 1/30/2017    Pending Studies:        none    Time spent on discharge day patient visit, preparing discharge paperwork and arranging for patient follow up.      Discharge Time (Minutes) :    60 minutes       Condition on Discharge    ______________________________________________________________________    Interval history/exam for day of discharge:    Patient is doing well this morning, still complains of cough, was able to sleep last night while lying flat off oxygen therapy. He agrees to discharge plan and ready to be discharged in stable condition     Filed Vitals:    01/25/17 0000 01/25/17 0400 01/25/17 0800 01/25/17 1200   BP: 133/78 107/59 117/58 106/63   Pulse: 86 78 76 68   Temp: 37.1 °C (98.8 °F) 36.8 °C (98.3 °F) 36.7 °C (98 °F) 36.8 °C (98.3 °F)   Resp: 18 18 18 18   Height:       Weight:       SpO2: 92% 90% 90% 99%     Weight/BMI: Body mass index is 25.77 kg/(m^2).  Pulse Oximetry: 99 %, O2 (LPM): 0, O2 Delivery: None (Room Air)    General: alert and oriented, calm, cooperative, in NAD  CVS: regular rhythm, normal S1,S2, no murmurs  PULM: Diminished breath sounds with basilar crackles on L side  ABD: soft,  no tenderness of distension, appreciated BS  EXT: +2 pitting pedal edema       Most Recent Labs:    Lab Results   Component Value Date/Time    WBC 8.9 01/23/2017 03:42 AM    RBC 4.11* 01/23/2017 03:42 AM    HEMOGLOBIN 10.8* 01/23/2017 03:42 AM    HEMATOCRIT 35.3* 01/23/2017 03:42 AM    MCV 85.9 01/23/2017 03:42 AM    MCH 26.3* 01/23/2017 03:42 AM    MCHC 30.6* 01/23/2017 03:42 AM    MPV 11.1 01/23/2017 03:42 AM    NEUTROPHILS-POLYS 64.30 01/23/2017 03:42 AM    LYMPHOCYTES 21.10* 01/23/2017 03:42 AM    MONOCYTES 11.20 01/23/2017 03:42 AM    EOSINOPHILS 2.90 01/23/2017 03:42 AM    BASOPHILS 0.20 01/23/2017 03:42 AM    HYPOCHROMIA 1+ 11/15/2016 05:45 AM    ANISOCYTOSIS 1+ 01/19/2017 02:43 AM      Lab Results   Component Value Date/Time    SODIUM 136 01/23/2017 03:41 AM    POTASSIUM 3.8 01/23/2017 03:41 AM    CHLORIDE 103 01/23/2017 03:41 AM    CO2 27 01/23/2017 03:41 AM    GLUCOSE 271* 01/23/2017 03:41 AM    BUN 23* 01/23/2017 03:41 AM    CREATININE 0.68 01/23/2017 03:41 AM      Lab Results   Component Value Date/Time    ALT(SGPT) 15 01/23/2017 03:41 AM    AST(SGOT) 8* 01/23/2017 03:41 AM    ALKALINE PHOSPHATASE 79 01/23/2017 03:41 AM    TOTAL BILIRUBIN 0.8 01/23/2017 03:41 AM    DIRECT BILIRUBIN 0.3 10/19/2016 11:30 AM    LIPASE 84* 02/13/2009 06:45 AM    ALBUMIN 2.8* 01/23/2017 03:41 AM    GLOBULIN 3.3 01/23/2017 03:41 AM    PRE-ALBUMIN 14.0* 11/18/2016 04:51 AM    INR 1.01 12/26/2016 05:50 AM    MACROCYTOSIS 1+ 11/22/2016 05:17 AM     Lab Results   Component Value Date/Time    PT 13.6 12/26/2016 05:50 AM    INR 1.01 12/26/2016 05:50 AM

## 2017-01-25 NOTE — DISCHARGE PLANNING
CCS called [facility].  stated that Elizabeth the admission director has left for the day message was left for Susan the admissions coordinator.

## 2017-01-25 NOTE — DISCHARGE PLANNING
CCS received a call back from Joleen at [facility]. The order does not qualify the patient for O2. The O2 testing for the patient shows that the patient is above 88%. Per Medicare guidelines O2 levels need to at 88% or Below to qualify fir O2. SW on floor Tenaya has been notified via voicemail.

## 2017-01-25 NOTE — DISCHARGE PLANNING
CCS received a DME Choice form. Per the choice form the referral has been sent to [facility]

## 2017-01-25 NOTE — CARE PLAN
Problem: Safety  Goal: Will remain free from injury  Bed locked in low position, call light within reach, tread socks placed     Problem: Pain Management  Goal: Pain level will decrease to patient’s comfort goal  Pain scale used, meds as needed, non-pharmacological methods discussed

## 2017-01-25 NOTE — PROGRESS NOTES
Received report. Assessed pt. Discussed plan of care. AOx4 Call light in reach. Fall precautions in place. Bed in lowest position, bed locked, RN and CNA numbers provided. Provided water, mac and cheese and sprite. Hourly rounding in practice.

## 2017-01-25 NOTE — PROGRESS NOTES
Bedside report completed, assumed pt care.  Pt resting in bed, A&Ox4.  Reports of pain in right hip, medicated.  Discussed POC with pt.  Call light within reach, bed alarm on.

## 2017-01-25 NOTE — DISCHARGE INSTRUCTIONS
Discharge Instructions    Discharged to home by car with self. Discharged via walking, hospital escort: Refused.  Special equipment needed: Not Applicable    Be sure to schedule a follow-up appointment with your primary care doctor or any specialists as instructed.     Discharge Plan:   Diet Plan: Discussed  Activity Level: Discussed  Confirmed Follow up Appointment: Appointment Scheduled  Confirmed Symptoms Management: Discussed  Medication Reconciliation Updated: Yes  Influenza Vaccine Indication: Not indicated: Previously immunized this influenza season and > 8 years of age    I understand that a diet low in cholesterol, fat, and sodium is recommended for good health. Unless I have been given specific instructions below for another diet, I accept this instruction as my diet prescription.   Other diet: low sodium    Special Instructions:   HF Patient Discharge Instructions  · Monitor your weight daily, and maintain a weight chart, to track your weight changes.   · Activity as tolerated, unless your Doctor has ordered otherwise.  · Follow a low fat, low cholesterol, low salt diet unless instructed otherwise by your Doctor. Read the labels on the back of food products and track your intake of fat, cholesterol and salt.   · Fluid Restriction No. If a Fluid Restriction has been ordered by your Doctor, measure fluids with a measuring cup to ensure that you are not exceeding the restriction.   · No smoking.  · Oxygen No. If your Doctor has ordered that you wear Oxygen at home, it is important to wear it as ordered.  · Did you receive an explanation from staff on the importance of taking each of your medications and why it is necessary to keep taking them unless your doctor says to stop? Yes  · Were all of your questions answered about how to manage your heart failure and what to do if you have increased signs and symptoms after you go home? Yes  · Do you feel like your heart failure care team involved you in the care  treatment plan and allowed you to make decisions regarding your care while in the hospital and addressed any discharge needs you might have? Yes    See the educational handout provided at discharge for more information on monitoring your daily weight, activity and diet. This also explains more about Heart Failure, symptoms of a flare-up and some of the tests that you have undergone.     Warning Signs of a Flare-Up include:  · Swelling in the ankles or lower legs.  · Shortness of breath, while at rest, or while doing normal activities.   · Shortness of breath at night when in bed, or coughing in bed.   · Requiring more pillows to sleep at night, or needing to sit up at night to sleep.  · Feeling weak, dizzy or fatigued.     When to call your Doctor:  · Call [facility] 7th floor about questions regarding the discharge instructions you were given [phone].  · Call your Primary Care Physician or Cardiologist if:   1. You experience any pain radiating to your jaw or neck.  2. You have any difficulty breathing.  3. You experience weight gain of 3 lbs in a day or 5 lbs in a week.   4. You feel any palpitations or irregular heartbeats.  5. You become dizzy or lose consciousness.   If you have had an angiogram or had a pacemaker or AICD placed, and experience:  1. Bleeding, drainage or swelling at the surgical / puncture site.  2. Fever greater than 100.0 F  3. Shock from internal defibrillator.  4. Cool and / or numb extremities.      · Is patient discharged on Warfarin / Coumadin?   No     · Is patient Post Blood Transfusion?  No    Depression / Suicide Risk    As you are discharged from this [facility], it is important to learn how to keep safe from harming yourself.    Recognize the warning signs:  · Abrupt changes in personality, positive or negative- including increase in energy   · Giving away possessions  · Change in eating patterns- significant weight changes-  positive or negative  · Change  in sleeping patterns- unable to sleep or sleeping all the time   · Unwillingness or inability to communicate  · Depression  · Unusual sadness, discouragement and loneliness  · Talk of wanting to die  · Neglect of personal appearance   · Rebelliousness- reckless behavior  · Withdrawal from people/activities they love  · Confusion- inability to concentrate     If you or a loved one observes any of these behaviors or has concerns about self-harm, here's what you can do:  · Talk about it- your feelings and reasons for harming yourself  · Remove any means that you might use to hurt yourself (examples: pills, rope, extension cords, firearm)  · Get professional help from the community (Mental Health, Substance Abuse, psychological counseling)  · Do not be alone:Call your Safe Contact- someone whom you trust who will be there for you.  · Call your local CRISIS HOTLINE 258-5177 or 494-189-4965  · Call your local Children's Mobile Crisis Response Team [place] (692) 871-1968 or www.[employer]  · Call the toll free National Suicide Prevention Hotlines   · National Suicide Prevention Lifeline 477-745-[place]NA (4587)  · National Hope Line Network 800-SUICIDE (854-8165)

## 2017-01-25 NOTE — DISCHARGE PLANNING
CCS received a call from Susan at [facility]. Susan request for [facility] to Call Elizabeth on the Cell phone # [phone]. CCS called Elizabeth and left a 3d voicemail in regards to this SNF referral.

## 2017-01-25 NOTE — PROGRESS NOTES
[facility] Internal Medicine Interval Note    Name Abner Torres     1960   Age/Sex 56 y.o. male   MRN [id number]   Code Status Full      After 5PM or if no immediate response to page, please call for cross-coverage  Attending/Team: [place] / JAQUI Hitchcock  Call [phone] to page   1st Call - Day Intern (R1):   Dr. Olguin 2nd Call - Day Sr. Resident (R2/R3):   Dr. Parker         Chief complaint/ reason for interval visit (Primary Diagnosis)   Dizziness, Cough and SOB x 1 day     ID: Mr. Torres is a 56 year old male with a PMHx of DM2, HTN and CAD s/p CABG in 2016. He presented to the ED with dizziness, cough and SOB x 1 day. He was recently released from rehabilitation center and states he has not taken any of his prescribed medications  including his insulin since his release 2 weeks ago due to lack of money to purchase them and miscommunication regarding what medications he was supposed to continue.     Patient is doing well this morning, sleeping in flat position without evidence of distress, SW was able to get his outpatient medications approved before discharge. Home oxygen evaluation was done, placed order for home oxygen, most likely to be discharged tomorrow     Interval Problem Daily Status Update    Active Hospital Problems    Diagnosis   • *Acute exacerbation of CHF (congestive heart failure) (CMS-HCC) [I50.9]   • Hypoxia and lactic acidosis [R09.02]   • Normocytic anemia [D64.9]   • IDDM (insulin dependent diabetes mellitus) (CMS-HCC) [E11.9, Z79.4]   • S/P CABG (coronary artery bypass graft) [Z95.1]   • Pleural effusion [J90]       Review of Systems   Constitutional: Negative for fever, chills and diaphoresis.   HENT: Negative for congestion and sore throat.    Eyes: Negative for blurred vision.   Respiratory: Positive for cough and shortness of breath. Negative for hemoptysis.    Cardiovascular: Positive for orthopnea and leg swelling. Negative for chest pain  and palpitations.   Gastrointestinal: Negative for nausea, vomiting, abdominal pain, diarrhea, constipation and blood in stool.   Genitourinary: Negative for dysuria, urgency and frequency.   Musculoskeletal: Negative for myalgias and joint pain.   Skin: Negative for itching and rash.   Neurological: Negative for dizziness, tingling, sensory change, focal weakness, loss of consciousness, weakness and headaches.       Consultants/Specialty  Cardiology     Disposition  Inpatient, patient will most likely be discharged home tomorrow    Quality Measures  EKG reviewed, Labs reviewed and Medications reviewed  Yeager catheter: No Yeager      DVT Prophylaxis: Enoxaparin (Lovenox)    Ulcer prophylaxis: Yes               Filed Vitals:    01/24/17 0800 01/24/17 1200 01/24/17 1600 01/24/17 2000   BP: 109/69 93/56 109/68 119/68   Pulse: 77 71 75 76   Temp: 36.6 °C (97.9 °F) 36.7 °C (98.1 °F) 36.9 °C (98.5 °F) 37.1 °C (98.7 °F)   Resp: 18 18 18 18   Height:       Weight:    86.2 kg (190 lb 0.6 oz)   SpO2: 94% 91% 94% 100%     Body mass index is 25.77 kg/(m^2). Weight: 86.2 kg (190 lb 0.6 oz)  Oxygen Therapy:  Pulse Oximetry: 100 %, O2 (LPM): 0, O2 Delivery: None (Room Air)    Physical Exam   Constitutional: He is oriented to person, place, and time and well-developed, well-nourished, and in no distress. No distress.   HENT:   Head: Normocephalic and atraumatic.   Mouth/Throat: Oropharynx is clear and moist.   Eyes: Conjunctivae are normal. Pupils are equal, round, and reactive to light. No scleral icterus.   Neck: Normal range of motion. Neck supple. No JVD present. No tracheal deviation present. No thyromegaly present.   Cardiovascular: Normal rate, regular rhythm and normal heart sounds.  Exam reveals no gallop and no friction rub.    Pulmonary/Chest: Effort normal. He has no wheezes. He has no rales.   Diminished breath sounds bibasilar, crackles present  on L base   Abdominal: Soft. Bowel sounds are normal. He exhibits no  distension. There is no tenderness. There is no rebound and no guarding.   Musculoskeletal: He exhibits edema.   +3 bilateral pitting pedal edema   Neurological: He is alert and oriented to person, place, and time. No cranial nerve deficit.   Skin: Skin is warm and dry. No rash noted. He is not diaphoretic. No erythema.         Lab Data Review:      1/19/2017  1:45 PM    Recent Labs      01/23/17 0341   SODIUM  136   POTASSIUM  3.8   CHLORIDE  103   CO2  27   BUN  23*   CREATININE  0.68   CALCIUM  8.6       Recent Labs      01/23/17 0341   ALTSGPT  15   ASTSGOT  8*   ALKPHOSPHAT  79   TBILIRUBIN  0.8   GLUCOSE  271*       Recent Labs      01/23/17 0342   RBC  4.11*   HEMOGLOBIN  10.8*   HEMATOCRIT  35.3*   PLATELETCT  214       Recent Labs      01/23/17 0341 01/23/17 0342   WBC   --   8.9   NEUTSPOLYS   --   64.30   LYMPHOCYTES   --   21.10*   MONOCYTES   --   11.20   EOSINOPHILS   --   2.90   BASOPHILS   --   0.20   ASTSGOT  8*   --    ALTSGPT  15   --    ALKPHOSPHAT  79   --    TBILIRUBIN  0.8   --            Assessment/Plan     * Acute on chronic combined systolic and diastolic congestive heart failure (CMS-HCC)  Assessment & Plan  Patient presented with SOB and orthopnea, associated with productive cough   He has history of ischemic cardiomyopathy s/p 4 vsl CABG in 10/2016 with last echo showing EF of 35% Grade 3 diastolic dysfunction (12/2016)  Exam revealed bilateral leg edema with bilateral crackles up to on third of lungs on admission  Labs showed BNP 2349, with negative trop x3 and non specific T wave changes on EKG (1/18)  CXR showed increased pulmonary edema with mild - mod bilateral pleural effusions (1/18)  Suspect CHF exacerbation due to inaccessibility to medications secondary to financial hardships   Patient was restarted on aspirin, plavix, coreg, lisinopril on admission  Cardiologist was consulted and recommended against repeating echo or doing stress test  Discontinue plavix (per cards  rec's) to reduce cost of medications  Plan  - Continue aspirin 81 MG and lisinopril 20 MG  - Continue coreg to 6.25 MG BID  - Continue atorvastatin 40 MG   - Continue IV lasix 40 MG BID  - Continue MATHEW hose  - F/U in 1 week after discharge per cardio      Hypoxia and lactic acidosis  Assessment & Plan  - supposed to be on home O2 which he could not get. He will get oxygen concentrator this time so that he does not need to replete again.   - lactic acidosis: HCO3 19, LA 2.9 trended down to 2.1.   - received Unasyn and azithro, blood Cx, UA (neg for infection), Ur Cx at ER.   - will hold Ab, WBC normal, afebrile, dry cough with viral URI symptoms. His hypoxia is more likely from pulmonary edema secondary to CHF leading decreased tissue perfusion and lactic acidosis. Follow lactic acid and monitor. (did have history of recurrent pneumonias)    S/P CABG (coronary artery bypass graft)  Assessment & Plan  S/p 4 vessels CABG in 10/2016  Plan  - Continue aspirin, lisinopril and coreg  - No need for stress test        Pleural effusion  Assessment & Plan  Hisotry of ecurrent pleural effusion with L side pleurodesis   CXR showed mild pleural effusion more evident on L side (1/18)  Most likely related to decompensated CHF   Plan  - Continue cardiac treatment for CHF  - Repeat CXR shows improvement in pulmonary edema and shrinking left pulmonary effusion     Ischemic cardiomyopathy  Assessment & Plan  S/p 4 vessels CABG in 10/2016 with last echo showing EF of 35% Grade 3 diastolic dysfunction (12/2016)  Plan  See CHF section          Acute respiratory failure with hypoxia (CMS-[facility])  Assessment & Plan  Supposed to be on home oxygen, didn't get that set up due financial hardships.   Consider getting concentrator this time to avoid repletion issues   Hypoxic respiratory failure most likely related to acute CHF exacerbation, less likely to be related to PE or ongoing infection   His condition is slowly improving while on lasix  therapy, will get CT chest to exclude possible interstitial lung disease   Plan  - Continue cardiac treatment for CHF  - Ordered CT chest w/o con  - Continue oxygen therapy and watch for any sign of infection       Financial difficulties  Assessment & Plan  Patient has financial hardships and can't get his disability checks to obtain his medications  Per patient, he is supposed to meet with [facility] for [facility] prescription   Plan  - Will provide patient with 1 month supply of medications on discharge  - Encourage patient to follow up with his disability checks  - Switched his medications to generic ones        IDDM (insulin dependent diabetes mellitus) (CMS-[facility])  Assessment & Plan  On metformin 500 bid, glargine 10 U QD at home  Last Hb A1 C 6.1 (12/2016), blood glucose 556 on admission due medication non compliance   Blood sugar running around 200s  Plan  - Continue insulin glargine 17 units   - Increase ISS and continue hypoglycemia protocol   - Consider restarting metformin as patient will not be able to afford insulin after discharge      Normocytic anemia  Assessment & Plan  Iron deficiency + anemia of chronic disease. (Low iron, low TIBC, normal saturation, normal B12, folate, Hx of GI Bleed)  Plan  - Continue Fe supplements

## 2017-01-25 NOTE — CARE PLAN
Problem: Communication  Goal: The ability to communicate needs accurately and effectively will improve  Intervention: Educate patient and significant other/support system about the plan of care, procedures, treatments, medications and allow for questions  Discuss plan of care and possible discharge. Address concerns and questions.       Problem: Pain Management  Goal: Pain level will decrease to patient’s comfort goal  Intervention: Follow pain managment plan developed in collaboration with patient and Interdisciplinary Team  Discuss pain management plan and med per MAR.

## 2017-01-25 NOTE — DISCHARGE PLANNING
Medical Social Work    [facility] completed Approved Services form in order to fill pt's prescriptions. [facility] faxed form to the [facility] at [phone]. The total cost of the prescriptions was $187.43.    DME choice form for oxygen initialed and signed consenting for referral to be sent to [facility]. [facility] faxed choice form to [facility].

## 2017-01-26 NOTE — PROGRESS NOTES
01/26/2017 0847 - Discharge Outreach attempt - No answer, KENDALL full  01/26/2017 1550 - Discharge Outreach second attempt - No answer, VMB full

## 2017-01-30 PROBLEM — E78.2 MIXED HYPERLIPIDEMIA: Status: ACTIVE | Noted: 2017-01-01

## 2017-01-30 NOTE — MR AVS SNAPSHOT
"        Abner Torres   2017 1:40 PM   Office Visit   MRN: [id number]    Department:  [facility]   Dept Phone:  [phone]    Description:  Male : 1960   Provider:  NNEKA Valle           Reason for Visit     Hospital Follow-up           Allergies as of 2017     No Known Allergies      You were diagnosed with     Chronic systolic congestive heart failure, NYHA class 4 (CMS-[facility])   [058523]  -  Primary     Ischemic cardiomyopathy   [774856]       S/P CABG (coronary artery bypass graft)   [694734]       3-vessel coronary artery disease   [588479]       Pleural effusion   [466825]       Mixed hyperlipidemia   [272.2.ICD-9-CM]       Gastroesophageal reflux disease without esophagitis   [825277]         Vital Signs     Blood Pressure Pulse Height Weight Body Mass Index Oxygen Saturation    128/74 mmHg 84 1.829 m (6' 0.01\") 92.987 kg (205 lb) 27.80 kg/m2 90%    Smoking Status                   Never Smoker            Basic Information     Date Of Birth Sex Race Ethnicity Preferred Language    1960 Male White Non- English      Your appointments     2017  1:20 PM   New Patient with NNEKA Christine   [facility] [address]    [address]  [address]  [place] [address] [phone]           Please bring Photo ID, Insurance Cards, All Medication Bottles and copies of any legal documents (such as Living Will, Power of ) If speaking a language besides English please bring an adult . Please arrive 30 minutes prior for check in and registration. You will be receiving a confirmation call a few days before your appointment from our automated call confirmation system.            2017 12:40 PM   FOLLOW UP with NNEKA Valle   [facility] (--)    [address], [address]  [place] [id number] [phone]              Problem List              ICD-10-CM Priority Class " Noted - Resolved    Other postprocedural status(V45.89)    5/22/2013 - Present    Community acquired pneumonia J18.9   10/9/2016 - Present    IDDM (insulin dependent diabetes mellitus) (CMS-HCC) E11.9, Z79.4 Low  10/10/2016 - Present    Medically noncompliant Z91.19   10/10/2016 - Present    Abnormal EKG R94.31   10/10/2016 - Present    Sepsis (CMS-HCC) A41.9   10/10/2016 - Present    Ventricular tachycardia (CMS-HCC) I47.2 Medium  10/11/2016 - Present    3-vessel coronary artery disease I25.10 High  10/13/2016 - Present    Staphylococcus aureus bacteremia R78.81 High  10/13/2016 - Present    DKA (diabetic ketoacidoses) (CMS-HCC) E13.10   10/16/2016 - Present    Thrush B37.0   10/27/2016 - Present    Normocytic anemia D64.9 Low  10/28/2016 - Present    Hypokalemia E87.6   11/3/2016 - Present    Debility R53.81   11/22/2016 - Present    Chronic systolic congestive heart failure, NYHA class 4 (CMS-HCC) I50.22   12/1/2016 - Present    Pneumonia J18.9 High  12/1/2016 - Present    Protein-calorie malnutrition, severe (CMS-HCC) E43   12/1/2016 - Present    Hypoxia and lactic acidosis R09.02 Medium  1/18/2017 - Present    Acute on chronic combined systolic and diastolic congestive heart failure (CMS-HCC) I50.43 High  1/18/2017 - Present    S/P CABG (coronary artery bypass graft) Z95.1   1/19/2017 - Present    Pleural effusion J90   1/19/2017 - Present    Ischemic cardiomyopathy I25.5   1/20/2017 - Present    Acute respiratory failure with hypoxia (CMS-HCC) J96.01   1/20/2017 - Present    Financial difficulties Z59.8   1/20/2017 - Present    Mixed hyperlipidemia E78.2   1/30/2017 - Present      Health Maintenance        Date Due Completion Dates    IMM HEP B VACCINE (1 of 3 - Primary Series) 1960 ---    DIABETES MONOFILAMENT / LE EXAM 1960 ---    RETINAL SCREENING 6/5/1979 ---    IMM DTaP/Tdap/Td Vaccine (1 - Tdap) 6/5/1980 ---    COLONOSCOPY 6/5/2011 ---    URINE ACR / MICROALBUMIN 8/3/2014 8/3/2013, 4/28/2012     A1C SCREENING 6/24/2017 12/24/2016, 10/10/2016, 5/1/2013, 4/28/2012    FASTING LIPID PROFILE 10/10/2017 10/10/2016, 8/3/2013, 5/5/2013, 4/28/2012, 2/12/2009    SERUM CREATININE 1/23/2018 1/23/2017, 1/21/2017, 1/20/2017, 1/19/2017, 1/18/2017, 1/5/2017, 1/3/2017, 12/31/2016, 12/27/2016, 12/26/2016, 12/25/2016, 12/24/2016, 12/22/2016, 12/20/2016, 12/18/2016, 12/16/2016, 12/15/2016, 12/14/2016, 12/12/2016, 12/11/2016, 12/10/2016, 12/9/2016, 12/8/2016, 12/7/2016, 12/5/2016, 12/1/2016, 11/30/2016, 11/27/2016, 11/26/2016, 11/25/2016, 11/24/2016, 11/23/2016, 11/22/2016, 11/19/2016, 11/15/2016, 11/12/2016, 11/11/2016, 11/9/2016, 11/8/2016, 11/7/2016, 11/6/2016, 11/5/2016, 11/4/2016, 11/3/2016, 11/2/2016, 10/31/2016, 10/31/2016, 10/29/2016, 10/28/2016, 10/27/2016, 10/26/2016, 10/25/2016, 10/24/2016, 10/23/2016, 10/22/2016, 10/21/2016, 10/20/2016, 10/19/2016, 10/17/2016, 10/14/2016, 10/13/2016, 10/12/2016, 10/12/2016, 10/11/2016, 10/10/2016, 10/10/2016, 10/10/2016, 10/9/2016, 10/9/2016, 10/9/2016, 8/3/2013, 5/9/2013, 5/8/2013, 5/7/2013, 5/6/2013, 5/5/2013, 5/4/2013, 5/3/2013, 5/2/2013, 5/1/2013, 4/30/2013, 4/28/2012, 2/13/2009, 2/12/2009            Current Immunizations     Influenza Vaccine Quad Inj (Pf) 10/29/2016  6:10 AM    Pneumococcal polysaccharide vaccine (PPSV-23) 5/1/2013  4:44 AM, 4/30/2013      Below and/or attached are the medications your provider expects you to take. Review all of your home medications and newly ordered medications with your provider and/or pharmacist. Follow medication instructions as directed by your provider and/or pharmacist. Please keep your medication list with you and share with your provider. Update the information when medications are discontinued, doses are changed, or new medications (including over-the-counter products) are added; and carry medication information at all times in the event of emergency situations     Allergies:  No Known Allergies          Medications  Valid as of:  January 30, 2017 -  3:05 PM    Generic Name Brand Name Tablet Size Instructions for use    Aspirin (Tablet Delayed Response) aspirin 81 MG Take 1 Tab by mouth every day.        Atorvastatin Calcium (Tab) LIPITOR 40 MG Take 1 Tab by mouth every evening.        Carvedilol (Tab) COREG 6.25 MG Take 1 Tab by mouth 2 times a day, with meals.        Ferrous Sulfate (Tab) ferrous sulfate 325 (65 FE) MG Take 1 Tab by mouth every morning with breakfast.        Furosemide (Tab) LASIX 40 MG Take 1 Tab by mouth every day.        Insulin Glargine (Solution) LANTUS 100 UNIT/ML Inject 17 Units as instructed every evening.        Lisinopril (Tab) PRINIVIL 20 MG Take 1 Tab by mouth every day.        Omeprazole (CAPSULE DELAYED RELEASE) PRILOSEC 20 MG Take 1 Cap by mouth every day.        Potassium Chloride Cheyanne CR (Tab CR) K-DUR 10 MEQ Take 1 Tab by mouth every day.        Spironolactone (Tab) ALDACTONE 25 MG Take 1 Tab by mouth every day.        .                 Medicines prescribed today were sent to:     [facility] [address] [place] [address] [address] [place], [place] [id number] [address] [place] [address]    Phone: [phone] Fax: [phone]    Open 24 Hours?: No      Medication refill instructions:       If your prescription bottle indicates you have medication refills left, it is not necessary to call your provider’s office. Please contact your pharmacy and they will refill your medication.    If your prescription bottle indicates you do not have any refills left, you may request refills at any time through one of the following ways: The online [employer] POS system (except Urgent Care), by calling your provider’s office, or by asking your pharmacy to contact your provider’s office with a refill request. Medication refills are processed only during regular business hours and may not be available until the next business day. Your provider may request additional information or to have a follow-up visit with you prior to refilling your medication.      *Please Note: Medication refills are assigned a new Rx number when refilled electronically. Your pharmacy may indicate that no refills were authorized even though a new prescription for the same medication is available at the pharmacy. Please request the medicine by name with the pharmacy before contacting your provider for a refill.           Towandas book Access Code: [email or web address]  Expires: 2/27/2017  9:47 AM    Towandas book  A secure, online tool to manage your health information     [employer]’s Towandas book® is a secure, online tool that connects you to your personalized health information from the privacy of your home -- day or night - making it very easy for you to manage your healthcare. Once the activation process is completed, you can even access your medical information using the Towandas book brinda, which is available for free in the Apple Brinda store or Google Play store.     Towandas book provides the following levels of access (as shown below):   My Chart Features   [facility] Primary Care Doctor [facility]  Specialists [facility]  Urgent  Care Non-[facility]  Primary Care  Doctor   Email your healthcare team securely and privately 24/7 X X X    Manage appointments: schedule your next appointment; view details of past/upcoming appointments X      Request prescription refills. X      View recent personal medical records, including lab and immunizations X X X X   View health record, including health history, allergies, medications X X X X   Read reports about your outpatient visits, procedures, consult and ER notes X X X X   See your discharge summary, which is a recap of your hospital and/or ER visit that includes your diagnosis, lab results, and care plan. X X       How to register for Towandas book:  1. Go to  [email or web address].  2. Click on the Sign Up Now box, which takes you to the New Member Sign Up page. You will need to provide the following information:  a. Enter your Towandas book Access Code exactly as it appears at the top of this  page. (You will not need to use this code after you’ve completed the sign-up process. If you do not sign up before the expiration date, you must request a new code.)   b. Enter your date of birth.   c. Enter your home email address.   d. Click Submit, and follow the next screen’s instructions.  3. Create a [employer] ID. This will be your [employer] login ID and cannot be changed, so think of one that is secure and easy to remember.  4. Create a [employer] password. You can change your password at any time.  5. Enter your Password Reset Question and Answer. This can be used at a later time if you forget your password.   6. Enter your e-mail address. This allows you to receive e-mail notifications when new information is available in [employer].  7. Click Sign Up. You can now view your health information.    For assistance activating your [employer] account, call [phone]

## 2017-01-30 NOTE — PROGRESS NOTES
Subjective:   Abner Torres is a 56 y.o. male who presents today for hospital follow-up. Starting back in October he underwent a four-vessel bypass on October 18, 2016. He had multiple complications including ventricular tachycardia and pleural effusions. He had thoracentesis done and eventually pleurodesis done on the right for the pleural effusions. His ejection fraction was initially 60 and dropped to 30 during his multiple hospitalizations. He has been in and out of the hospital and rehabilitation ever since October.    Most recently he was discharged from [facility] on January 25th 2017. He went in with shortness of breath. He does have congestive heart failure and is on diuretics.  He continues to have shortness of breath but states is getting better every day. He is walking on a daily basis for 10-15 minutes and tolerates it well. Since the weather has been cold he has been taking the bus to the mall to do his walking. He has had some financial difficulties and could not afford his medications since he has not had a disability check since December. He has now obtained his medications and is taking them regularly.    He denies any chest tightness, heaviness or pressure. No incisional chest pain. He has a cough that is productive small amount of gray sputum. He continues to have ankle edema.    Past Medical History   Diagnosis Date   • Diabetes    • Hypertension    • CHF (congestive heart failure) (CMS-HCC)    • Hyperlipidemia      Past Surgical History   Procedure Laterality Date   • Incision and drainage general  5/1/2013     Performed by Herbert Serrano M.D. at SURGERY [facility] ORS   • Debridement  5/1/2013     Performed by Herbert Serrano M.D. at SURGERY [facility] ORS   • Debridement  5/22/2013     Performed by Herbert Serrano M.D. at SURGERY [facility] ORS   • Multiple coronary artery bypass endo vein harvest  10/18/2016     Procedure: MULTIPLE CORONARY ARTERY BYPASS ENDO VEIN HARVEST X4 WITH  TROY;  Surgeon: Keith Rojas M.D.;  Location: SURGERY [facility];  Service:    • Thoracoscopy Left 12/5/2016     Procedure: THORACOSCOPY  WITH PLEURODESIS;  Surgeon: John H Ganser, M.D.;  Location: SURGERY [facility];  Service:    • Chest tube insertion Right 12/5/2016     Procedure: CHEST TUBE INSERTION;  Surgeon: John H Ganser, M.D.;  Location: SURGERY [facility];  Service:    • Other cardiac surgery  10/2016     Family History   Problem Relation Age of Onset   • Diabetes Sister    • Diabetes Brother    • Heart Disease Brother 48     heart attacks   • Heart Disease Father 50     heart attack     History   Smoking status   • Never Smoker    Smokeless tobacco   • Never Used     No Known Allergies  Outpatient Encounter Prescriptions as of 1/30/2017   Medication Sig Dispense Refill   • atorvastatin (LIPITOR) 40 MG Tab Take 1 Tab by mouth every evening. 30 Tab 11   • lisinopril (PRINIVIL) 20 MG Tab Take 1 Tab by mouth every day. 30 Tab 11   • carvedilol (COREG) 6.25 MG Tab Take 1 Tab by mouth 2 times a day, with meals. 60 Tab 11   • furosemide (LASIX) 40 MG Tab Take 1 Tab by mouth every day. 100 Tab 3   • spironolactone (ALDACTONE) 25 MG Tab Take 1 Tab by mouth every day. 30 Tab 11   • omeprazole (PRILOSEC) 20 MG delayed-release capsule Take 1 Cap by mouth every day. 30 Cap 11   • potassium chloride SA (K-DUR) 10 MEQ Tab CR Take 1 Tab by mouth every day. 100 Tab 3   • aspirin EC 81 MG EC tablet Take 1 Tab by mouth every day. 30 Tab 3   • insulin glargine (LANTUS) 100 UNIT/ML Solution Inject 17 Units as instructed every evening. 10 mL 1   • ferrous sulfate 325 (65 FE) MG tablet Take 1 Tab by mouth every morning with breakfast. 30 Tab 3   • [DISCONTINUED] atorvastatin (LIPITOR) 40 MG Tab Take 1 Tab by mouth every bedtime. 30 Tab 3   • [DISCONTINUED] lisinopril (PRINIVIL) 20 MG Tab Take 1 Tab by mouth every day. 30 Tab 3   • [DISCONTINUED] carvedilol (COREG) 6.25 MG Tab Take 1 Tab by mouth 2 times a day,  "with meals. 60 Tab 3   • [DISCONTINUED] furosemide (LASIX) 40 MG Tab Take 1 Tab by mouth every day. 30 Tab 3   • [DISCONTINUED] spironolactone (ALDACTONE) 25 MG Tab Take 1 Tab by mouth every day. 30 Tab 3   • [DISCONTINUED] omeprazole (PRILOSEC) 20 MG delayed-release capsule Take 1 Cap by mouth every day. 30 Cap 3   • [DISCONTINUED] potassium chloride SA (K-DUR) 10 MEQ Tab CR Take 1 Tab by mouth every day. 30 Tab 3     No facility-administered encounter medications on file as of 1/30/2017.     Review of Systems   Constitutional: Positive for malaise/fatigue.   Respiratory: Positive for cough, sputum production (small amt grey sputum.) and shortness of breath (improving daily.).    Cardiovascular: Positive for leg swelling. Negative for chest pain, palpitations, orthopnea, claudication and PND.   Gastrointestinal: Negative for abdominal pain.   Musculoskeletal: Negative for myalgias.   Neurological: Negative for dizziness and weakness.        Objective:   /74 mmHg  Pulse 84  Ht 1.829 m (6' 0.01\")  Wt 92.987 kg (205 lb)  BMI 27.80 kg/m2  SpO2 90%    Physical Exam   Constitutional: He is oriented to person, place, and time. He appears well-developed and well-nourished.   HENT:   Head: Normocephalic.   Eyes: Conjunctivae are normal.   Neck: No JVD present. No thyromegaly present.   Cardiovascular: Normal rate and regular rhythm.  Exam reveals gallop and S3.    Pulmonary/Chest: Effort normal. He has no wheezes. He has rales (retirement up bilateral lung fields.).   Abdominal: Soft. Bowel sounds are normal. He exhibits no distension. There is no tenderness.   Musculoskeletal: He exhibits edema (2+ pretibial bilaterally.).   Neurological: He is alert and oriented to person, place, and time.   Skin: Skin is warm and dry.   Psychiatric: He has a normal mood and affect.     Transthoracic  Echo Report      Echocardiography Laboratory    CONCLUSIONS  Compared to the images of the prior study done 10/11/16, there has " been   a significant decrease in the EF.    1. Left ventricular ejection fraction is visually estimated to be 35%.    Grade III diastolic dysfunction (restrictive pattern).     2. Aortic sclerosis without stenosis.    3. Estimated right ventricular systolic pressure  is 30 mmHg.    4. Right atrial pressure is estimated to be 15 mmHg.    Results for BROOK FERREIRA (MRN [id number]) as of 1/30/2017 14:24   Ref. Range 1/23/2017 03:41   Sodium Latest Ref Range: 135-145 mmol/L 136   Potassium Latest Ref Range: 3.6-5.5 mmol/L 3.8   Chloride Latest Ref Range:  mmol/L 103   Co2 Latest Ref Range: 20-33 mmol/L 27   Anion Gap Latest Ref Range: 0.0-11.9  6.0   Glucose Latest Ref Range: 65-99 mg/dL 271 (H)   Bun Latest Ref Range: 8-22 mg/dL 23 (H)   Creatinine Latest Ref Range: 0.50-1.40 mg/dL 0.68   GFR If  Latest Ref Range: >60 mL/min/1.73 m 2 >60   GFR If Non  Latest Ref Range: >60 mL/min/1.73 m 2 >60   Calcium Latest Ref Range: 8.5-10.5 mg/dL 8.6   AST(SGOT) Latest Ref Range: 12-45 U/L 8 (L)   ALT(SGPT) Latest Ref Range: 2-50 U/L 15   Alkaline Phosphatase Latest Ref Range: 30-99 U/L 79   Total Bilirubin Latest Ref Range: 0.1-1.5 mg/dL 0.8   Albumin Latest Ref Range: 3.2-4.9 g/dL 2.8 (L)   Total Protein Latest Ref Range: 6.0-8.2 g/dL 6.1   Globulin Latest Ref Range: 1.9-3.5 g/dL 3.3   A-G Ratio Latest Units: g/dL 0.8     Assessment:     1. Chronic systolic congestive heart failure, NYHA class 4 (CMS-[facility])  furosemide (LASIX) 40 MG Tab    spironolactone (ALDACTONE) 25 MG Tab    potassium chloride SA (K-DUR) 10 MEQ Tab CR   2. Ischemic cardiomyopathy  lisinopril (PRINIVIL) 20 MG Tab    carvedilol (COREG) 6.25 MG Tab   3. S/P CABG (coronary artery bypass graft)     4. 3-vessel coronary artery disease     5. Pleural effusion     6. Mixed hyperlipidemia  atorvastatin (LIPITOR) 40 MG Tab   7. Gastroesophageal reflux disease without esophagitis  omeprazole (PRILOSEC) 20 MG delayed-release  capsule       Medical Decision Making:  Today's Assessment / Status / Plan:     Congestive heart failure: He is fluid overloaded today. I will have him increase Lasix and potassium to twice a day for the next 2 days and then return to once daily. I've given him written instructions for taking additional Lasix and potassium based on weight and symptoms. He will continue on spironolactone.    Ischemic cardiomyopathy: He will remain on lisinopril and carvedilol in hopes that his ejection fraction will improve. I did not increase the carvedilol today as I want to get his fluid down before I raise this medicine.    Coronary artery disease status post bypass: No anginal symptoms. Chest incision is well-healed. He will continue on aspirin.    Pleural effusion: He had pleurodesis on the right and still has a small pleural effusion on the left per chest CT. He will continue on diuresis.    Hyperlipidemia: I will refill his atorvastatin. We will check lipids in 3 months.    Acid reflux: I will refill the omeprazole for him.    He will follow-up with myself in 2 weeks to monitor his fluid status. At that time I'm hoping to increase the carvedilol dose. We will need to get him set up with the heart failure clinic. He will follow-up sooner if problems.    Collaborating Provider: Dr Perdue.

## 2017-01-31 PROBLEM — J90 PLEURAL EFFUSION: Status: RESOLVED | Noted: 2017-01-01 | Resolved: 2017-01-01

## 2017-01-31 NOTE — MR AVS SNAPSHOT
"        Abner Torres   2017 1:20 PM   Office Visit   MRN: [id number]    Department:  [address] [place] [address]   Dept Phone:  [phone]    Description:  Male : 1960   Provider:  NNEKA Christine           Reason for Visit     Establish Care rehab discharge     Hospital Follow-up open heart surgery       Allergies as of 2017     No Known Allergies      You were diagnosed with     Uncontrolled type 2 diabetes mellitus without complication, with long-term current use of insulin (CMS-HCC)   [5575999]       Recurrent pneumonia   [622590]       Pleural effusion   [495993]         Vital Signs     Blood Pressure Pulse Temperature Respirations Height Weight    132/70 mmHg 79 36.9 °C (98.5 °F) 16 1.854 m (6' 0.99\") 94.802 kg (209 lb)    Body Mass Index Oxygen Saturation Smoking Status             27.58 kg/m2 95% Never Smoker          Basic Information     Date Of Birth Sex Race Ethnicity Preferred Language    1960 Male White Non- English      Your appointments     2017 12:40 PM   FOLLOW UP with NNEKA Valle   [facility] (--)    [address], [facility] [address]  [facility] [id number] [phone]              Problem List              ICD-10-CM Priority Class Noted - Resolved    Medically noncompliant Z91.19   10/10/2016 - Present    Ventricular tachycardia (CMS-HCC) I47.2 Medium  10/11/2016 - Present    3-vessel coronary artery disease I25.10 High  10/13/2016 - Present    Staphylococcus aureus bacteremia R78.81 High  10/13/2016 - Present    DKA (diabetic ketoacidoses) (CMS-HCC) E13.10   10/16/2016 - Present    Thrush B37.0   10/27/2016 - Present    Normocytic anemia D64.9 Low  10/28/2016 - Present    Hypokalemia E87.6   11/3/2016 - Present    Debility R53.81   2016 - Present    Chronic systolic congestive heart failure, NYHA class 4 (CMS-HCC) I50.22   2016 - Present    Protein-calorie malnutrition, severe (CMS-HCC) E43   " 12/1/2016 - Present    Hypoxia and lactic acidosis R09.02 Medium  1/18/2017 - Present    Acute on chronic combined systolic and diastolic congestive heart failure (CMS-HCC) I50.43 High  1/18/2017 - Present    S/P CABG (coronary artery bypass graft) Z95.1   1/19/2017 - Present    Ischemic cardiomyopathy I25.5   1/20/2017 - Present    Acute respiratory failure with hypoxia (CMS-HCC) J96.01   1/20/2017 - Present    Financial difficulties Z59.8   1/20/2017 - Present    Mixed hyperlipidemia E78.2   1/30/2017 - Present      Health Maintenance        Date Due Completion Dates    IMM HEP B VACCINE (1 of 3 - Primary Series) 1960 ---    DIABETES MONOFILAMENT / LE EXAM 1960 ---    RETINAL SCREENING 6/5/1979 ---    IMM DTaP/Tdap/Td Vaccine (1 - Tdap) 6/5/1980 ---    COLONOSCOPY 6/5/2011 ---    URINE ACR / MICROALBUMIN 8/3/2014 8/3/2013, 4/28/2012    A1C SCREENING 6/24/2017 12/24/2016, 10/10/2016, 5/1/2013, 4/28/2012    FASTING LIPID PROFILE 10/10/2017 10/10/2016, 8/3/2013, 5/5/2013, 4/28/2012, 2/12/2009    SERUM CREATININE 1/23/2018 1/23/2017, 1/21/2017, 1/20/2017, 1/19/2017, 1/18/2017, 1/5/2017, 1/3/2017, 12/31/2016, 12/27/2016, 12/26/2016, 12/25/2016, 12/24/2016, 12/22/2016, 12/20/2016, 12/18/2016, 12/16/2016, 12/15/2016, 12/14/2016, 12/12/2016, 12/11/2016, 12/10/2016, 12/9/2016, 12/8/2016, 12/7/2016, 12/5/2016, 12/1/2016, 11/30/2016, 11/27/2016, 11/26/2016, 11/25/2016, 11/24/2016, 11/23/2016, 11/22/2016, 11/19/2016, 11/15/2016, 11/12/2016, 11/11/2016, 11/9/2016, 11/8/2016, 11/7/2016, 11/6/2016, 11/5/2016, 11/4/2016, 11/3/2016, 11/2/2016, 10/31/2016, 10/31/2016, 10/29/2016, 10/28/2016, 10/27/2016, 10/26/2016, 10/25/2016, 10/24/2016, 10/23/2016, 10/22/2016, 10/21/2016, 10/20/2016, 10/19/2016, 10/17/2016, 10/14/2016, 10/13/2016, 10/12/2016, 10/12/2016, 10/11/2016, 10/10/2016, 10/10/2016, 10/10/2016, 10/9/2016, 10/9/2016, 10/9/2016, 8/3/2013, 5/9/2013, 5/8/2013, 5/7/2013, 5/6/2013, 5/5/2013, 5/4/2013, 5/3/2013,  5/2/2013, 5/1/2013, 4/30/2013, 4/28/2012, 2/13/2009, 2/12/2009            Current Immunizations     Influenza Vaccine Quad Inj (Pf) 10/29/2016  6:10 AM    Pneumococcal polysaccharide vaccine (PPSV-23) 5/1/2013  4:44 AM, 4/30/2013      Below and/or attached are the medications your provider expects you to take. Review all of your home medications and newly ordered medications with your provider and/or pharmacist. Follow medication instructions as directed by your provider and/or pharmacist. Please keep your medication list with you and share with your provider. Update the information when medications are discontinued, doses are changed, or new medications (including over-the-counter products) are added; and carry medication information at all times in the event of emergency situations     Allergies:  No Known Allergies          Medications  Valid as of: January 31, 2017 -  2:22 PM    Generic Name Brand Name Tablet Size Instructions for use    Aspirin (Tablet Delayed Response) aspirin 81 MG Take 1 Tab by mouth every day.        Atorvastatin Calcium (Tab) LIPITOR 40 MG Take 1 Tab by mouth every evening.        Carvedilol (Tab) COREG 6.25 MG Take 1 Tab by mouth 2 times a day, with meals.        Ferrous Sulfate (Tab) ferrous sulfate 325 (65 FE) MG Take 1 Tab by mouth every morning with breakfast.        Furosemide (Tab) LASIX 40 MG Take 1 Tab by mouth every day.        Insulin Glargine (Solution) LANTUS 100 UNIT/ML Inject 17 Units as instructed every evening.        Lisinopril (Tab) PRINIVIL 20 MG Take 1 Tab by mouth every day.        Misc. Devices (Misc) Misc. Devices  One touch test strips to use BID on insulin.        Omeprazole (CAPSULE DELAYED RELEASE) PRILOSEC 20 MG Take 1 Cap by mouth every day.        Potassium Chloride Cheyanne CR (Tab CR) K-DUR 10 MEQ Take 1 Tab by mouth every day.        Spironolactone (Tab) ALDACTONE 25 MG Take 1 Tab by mouth every day.        .                 Medicines prescribed today were  sent to:     [facility]  [place], [place] - [address]    [address] [place] [address]    Phone: [phone] Fax: [phone]    Open 24 Hours?: No      Medication refill instructions:       If your prescription bottle indicates you have medication refills left, it is not necessary to call your provider’s office. Please contact your pharmacy and they will refill your medication.    If your prescription bottle indicates you do not have any refills left, you may request refills at any time through one of the following ways: The online [email or web address] system (except Urgent Care), by calling your provider’s office, or by asking your pharmacy to contact your provider’s office with a refill request. Medication refills are processed only during regular business hours and may not be available until the next business day. Your provider may request additional information or to have a follow-up visit with you prior to refilling your medication.   *Please Note: Medication refills are assigned a new Rx number when refilled electronically. Your pharmacy may indicate that no refills were authorized even though a new prescription for the same medication is available at the pharmacy. Please request the medicine by name with the pharmacy before contacting your provider for a refill.        Your To Do List     Future Labs/Procedures Complete By Expires    COMP METABOLIC PANEL  As directed 2/1/2018    MICROALBUMIN CREAT RATIO URINE  As directed 2/1/2018    VITAMIN 1,25 DIHYDROXY  As directed 2/1/2018      Referral     A referral request has been sent to our patient care coordination department. Please allow 3-5 business days for us to process this request and contact you either by phone or mail. If you do not hear from us by the 5th business day, please call us at [phone].           [email or web address] Access Code: [email or web address]  Expires: 2/27/2017  9:47 AM    [email or web address]  A secure, online tool to manage your health information     [facility]’s MyChart® is a secure, online tool that connects you to your personalized health information from the privacy of your home -- day or night - making it very easy for you to manage your healthcare. Once the activation process is completed, you can even access your medical information using the iTMan brinda, which is available for free in the [employer] Brinda store or Google Play store.     iTMan provides the following levels of access (as shown below):   My Chart Features   [facility] Doctor [facility]  Specialists [facility]  Urgent  Care Non-[facility]  Primary Care  Doctor   Email your healthcare team securely and privately 24/7 X X X    Manage appointments: schedule your next appointment; view details of past/upcoming appointments X      Request prescription refills. X      View recent personal medical records, including lab and immunizations X X X X   View health record, including health history, allergies, medications X X X X   Read reports about your outpatient visits, procedures, consult and ER notes X X X X   See your discharge summary, which is a recap of your hospital and/or ER visit that includes your diagnosis, lab results, and care plan. X X       How to register for iTMan:  1. Go to  [email or web address].  2. Click on the Sign Up Now box, which takes you to the New Member Sign Up page. You will need to provide the following information:  a. Enter your iTMan Access Code exactly as it appears at the top of this page. (You will not need to use this code after you’ve completed the sign-up process. If you do not sign up before the expiration date, you must request a new code.)   b. Enter your date of birth.   c. Enter your home email address.   d. Click Submit, and follow the next screen’s instructions.  3. Create a iTMan ID. This will be your iTMan login ID and cannot be changed, so think of one that is secure and easy to remember.  4. Create a iTMan password. You can change your password at  any time.  5. Enter your Password Reset Question and Answer. This can be used at a later time if you forget your password.   6. Enter your e-mail address. This allows you to receive e-mail notifications when new information is available in [employer].  7. Click Sign Up. You can now view your health information.    For assistance activating your eG[employer] account, call [phone]

## 2017-02-01 PROBLEM — J96.01 ACUTE RESPIRATORY FAILURE WITH HYPOXIA (HCC): Status: RESOLVED | Noted: 2017-01-01 | Resolved: 2017-01-01

## 2017-02-01 NOTE — PROGRESS NOTES
Subjective:      Abner Torres is a 56 y.o. male who presents with Establish Care and Hospital Follow-up            HPI Abner Torres is a medically complex patient here today to establish care for multiple health problems.    1. Uncontrolled type 2 diabetes mellitus without complication, with long-term current use of insulin (CMS-HCC)  Patient apparently is a type II diabetic but was not adequately controlling his diabetes and subsequently had diabetic ketoacidosis last year. Even now, patient is supposed to be taking 17 units of Lantus daily but is confused, thinking this is a short acting insulin and needs orders for 3 times a day. His last hemoglobin A1c was good from rehabilitation.    2. Recurrent pneumonia  Patient had been hospitalized in October and November foot appears to be pneumonia and staph aureus bacteremia. I do not see a history of COPD or follow-up with pulmonology.     3. Pleural effusion  Patient had pleural effusions in the hospital with subsequent thoracentesis and reports his breathing is improved outpatient.    4. Acute on chronic combined systolic and diastolic congestive heart failure (CMS-HCC)  Patient apparently had a four-vessel bypass in October of last year and had multiple complications afterwards with his ejection fraction dropping from 60% to 30%. He has followed up with cardiology post discharge and he is now on carvedilol, lisinopril, atorvastatin, Aldactone and Lasix. He was appropriately advised to increase his Lasix dosage when his weight went up. He currently is not complaining of shortness of breath or edema and states he is taking all of his medications as prescribed.            Current Outpatient Prescriptions   Medication Sig Dispense Refill   • Misc. Devices Misc One touch test strips to use BID on insulin. 60 Each 11   • aspirin EC 81 MG EC tablet Take 1 Tab by mouth every day. 30 Tab 3   • insulin glargine (LANTUS) 100 UNIT/ML Solution Inject 17 Units as  "instructed every evening. 10 mL 1   • ferrous sulfate 325 (65 FE) MG tablet Take 1 Tab by mouth every morning with breakfast. 30 Tab 3   • atorvastatin (LIPITOR) 40 MG Tab Take 1 Tab by mouth every evening. 30 Tab 11   • lisinopril (PRINIVIL) 20 MG Tab Take 1 Tab by mouth every day. 30 Tab 11   • carvedilol (COREG) 6.25 MG Tab Take 1 Tab by mouth 2 times a day, with meals. 60 Tab 11   • furosemide (LASIX) 40 MG Tab Take 1 Tab by mouth every day. 100 Tab 3   • spironolactone (ALDACTONE) 25 MG Tab Take 1 Tab by mouth every day. 30 Tab 11   • omeprazole (PRILOSEC) 20 MG delayed-release capsule Take 1 Cap by mouth every day. 30 Cap 11   • potassium chloride SA (K-DUR) 10 MEQ Tab CR Take 1 Tab by mouth every day. 100 Tab 3     No current facility-administered medications for this visit.     Social History   Substance Use Topics   • Smoking status: Never Smoker    • Smokeless tobacco: Never Used   • Alcohol Use: No     Family History   Problem Relation Age of Onset   • Diabetes Sister    • Diabetes Brother    • Heart Disease Brother 48     heart attacks   • Heart Disease Father 50     heart attack     Past Medical History   Diagnosis Date   • Diabetes    • Hypertension    • CHF (congestive heart failure) (CMS-HCC)    • Hyperlipidemia        Review of Systems   All other systems reviewed and are negative.         Objective:     /70 mmHg  Pulse 79  Temp(Src) 36.9 °C (98.5 °F)  Resp 16  Ht 1.854 m (6' 0.99\")  Wt 94.802 kg (209 lb)  BMI 27.58 kg/m2  SpO2 95%     Physical Exam   Constitutional: He is oriented to person, place, and time. He appears well-developed and well-nourished. No distress.   HENT:   Head: Normocephalic and atraumatic.   Right Ear: External ear normal.   Left Ear: External ear normal.   Nose: Nose normal.   Mouth/Throat: Oropharynx is clear and moist.   Eyes: Conjunctivae are normal. Right eye exhibits no discharge. Left eye exhibits no discharge.   Neck: Normal range of motion. Neck supple. " No tracheal deviation present. No thyromegaly present.   Cardiovascular: Normal rate and regular rhythm.    Murmur heard.  Pulmonary/Chest: Effort normal and breath sounds normal. No respiratory distress. He has no wheezes. He has no rales.   Lymphadenopathy:     He has no cervical adenopathy.   Neurological: He is alert and oriented to person, place, and time. Coordination normal.   Skin: Skin is warm and dry. No rash noted. He is not diaphoretic. No erythema.   Psychiatric: He has a normal mood and affect. His behavior is normal. Judgment and thought content normal.   Nursing note and vitals reviewed.              Assessment/Plan:     1. Uncontrolled type 2 diabetes mellitus without complication, with long-term current use of insulin (CMS-[facility])  I reviewed with patient that his Lantus is a once a day insulin. I explained he should be taking it daily and that based on his blood sugars, the dosage can be adjusted. I will have him start checking his blood sugars at least once a day and do lab work before his next visit. It is too early for hemoglobin A1c but I would like to see this below 7.0. He will be referred to diabetic education and ophthalmology.  - Misc. Devices Misc; One touch test strips to use BID on insulin.  Dispense: 60 Each; Refill: 11  - MICROALBUMIN CREAT RATIO URINE; Future  -   - REFERRAL TO DIABETIC EDUCATION Diabetes Self Management Education / Training (DSME/T) and Medical Nutrition Therapy (MNT): Initial Group DSME/MNT as authorized by payor, Follow-Up DSME/MNT as authorized by payor; DSME/T Content: Monitoring Diabetes,   - COMP METABOLIC PANEL; Future  - VITAMIN 1,25 DIHYDROXY; Future    2. Recurrent pneumonia  I will refer patient to pulmonology to see if there is a reason for his recurrent pneumonia and that his breathing issues and not just secondary to his heart failure.  - REFERRAL TO PULMONOLOGY    3. Pleural effusion    - REFERRAL TO PULMONOLOGY    4. Acute on chronic combined systolic  and diastolic congestive heart failure (CMS-HCC)  Patient will continue to follow with cardiology. I reviewed with him the directions listed by his APRN on Lasix and potassium usage. His weight actually appears to be stable today.

## 2017-02-03 NOTE — TELEPHONE ENCOUNTER
2. SPECIFIC Action To Be Taken: Referral pending, please sign.    3. 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 Specialty & Provider Name/Lab/Imaging Location:  Refer to a retina specialist for evaluation of evidence of severe diabetic eye disease and clinically significant macular edema.    5. Is appointment scheduled for requested order/referral: no

## 2017-02-13 NOTE — MR AVS SNAPSHOT
"        Abner Torres   2017 12:40 PM   Office Visit   MRN: [id number]    Department:  [facility]   Dept Phone:  [phone]    Description:  Male : 1960   Provider:  NNEKA Valle           Reason for Visit     Follow-Up           Allergies as of 2017     No Known Allergies      You were diagnosed with     Chronic systolic congestive heart failure, NYHA class 4 (CMS-[facility])   [840419]  -  Primary     Ischemic cardiomyopathy   [107638]       3-vessel coronary artery disease   [322216]       Mixed hyperlipidemia   [272.2.ICD-9-CM]         Vital Signs     Blood Pressure Pulse Height Weight Body Mass Index Oxygen Saturation    140/76 mmHg 90 1.854 m (6' 0.99\") 94.348 kg (208 lb) 27.45 kg/m2 95%    Smoking Status                   Never Smoker            Basic Information     Date Of Birth Sex Race Ethnicity Preferred Language    1960 Male White Non- English      Your appointments     2017  4:00 PM   FOLLOW UP with NNEKA Valle   [facility] (--)    [address], [address]  [facility] [address]   [phone]              Problem List              ICD-10-CM Priority Class Noted - Resolved    Medically noncompliant Z91.19   10/10/2016 - Present    Ventricular tachycardia (CMS-HCC) I47.2 Medium  10/11/2016 - Present    3-vessel coronary artery disease I25.10 High  10/13/2016 - Present    Staphylococcus aureus bacteremia R78.81 High  10/13/2016 - Present    Normocytic anemia D64.9 Low  10/28/2016 - Present    Debility R53.81   2016 - Present    Protein-calorie malnutrition, severe (CMS-[facility]) E43   2016 - Present    Hypoxia and lactic acidosis R09.02 Medium  2017 - Present    Acute on chronic combined systolic and diastolic congestive heart failure (CMS-[facility]) I50.43 High  2017 - Present    S/P CABG (coronary artery bypass graft) Z95.1   2017 - Present    Ischemic cardiomyopathy I25.5   2017 " - Present    Financial difficulties Z59.8   1/20/2017 - Present    Mixed hyperlipidemia E78.2   1/30/2017 - Present      Health Maintenance        Date Due Completion Dates    IMM HEP B VACCINE (1 of 3 - Primary Series) 1960 ---    DIABETES MONOFILAMENT / LE EXAM 1960 ---    IMM DTaP/Tdap/Td Vaccine (1 - Tdap) 6/5/1980 ---    COLONOSCOPY 6/5/2011 ---    URINE ACR / MICROALBUMIN 8/3/2014 8/3/2013, 4/28/2012    A1C SCREENING 6/24/2017 12/24/2016, 10/10/2016, 5/1/2013, 4/28/2012    FASTING LIPID PROFILE 10/10/2017 10/10/2016, 8/3/2013, 5/5/2013, 4/28/2012, 2/12/2009    SERUM CREATININE 1/23/2018 1/23/2017, 1/21/2017, 1/20/2017, 1/19/2017, 1/18/2017, 1/5/2017, 1/3/2017, 12/31/2016, 12/27/2016, 12/26/2016, 12/25/2016, 12/24/2016, 12/22/2016, 12/20/2016, 12/18/2016, 12/16/2016, 12/15/2016, 12/14/2016, 12/12/2016, 12/11/2016, 12/10/2016, 12/9/2016, 12/8/2016, 12/7/2016, 12/5/2016, 12/1/2016, 11/30/2016, 11/27/2016, 11/26/2016, 11/25/2016, 11/24/2016, 11/23/2016, 11/22/2016, 11/19/2016, 11/15/2016, 11/12/2016, 11/11/2016, 11/9/2016, 11/8/2016, 11/7/2016, 11/6/2016, 11/5/2016, 11/4/2016, 11/3/2016, 11/2/2016, 10/31/2016, 10/31/2016, 10/29/2016, 10/28/2016, 10/27/2016, 10/26/2016, 10/25/2016, 10/24/2016, 10/23/2016, 10/22/2016, 10/21/2016, 10/20/2016, 10/19/2016, 10/17/2016, 10/14/2016, 10/13/2016, 10/12/2016, 10/12/2016, 10/11/2016, 10/10/2016, 10/10/2016, 10/10/2016, 10/9/2016, 10/9/2016, 10/9/2016, 8/3/2013, 5/9/2013, 5/8/2013, 5/7/2013, 5/6/2013, 5/5/2013, 5/4/2013, 5/3/2013, 5/2/2013, 5/1/2013, 4/30/2013, 4/28/2012, 2/13/2009, 2/12/2009    RETINAL SCREENING 1/31/2018 1/31/2017            Current Immunizations     Influenza Vaccine Quad Inj (Pf) 10/29/2016  6:10 AM    Pneumococcal polysaccharide vaccine (PPSV-23) 5/1/2013  4:44 AM, 4/30/2013      Below and/or attached are the medications your provider expects you to take. Review all of your home medications and newly ordered medications with your provider and/or  pharmacist. Follow medication instructions as directed by your provider and/or pharmacist. Please keep your medication list with you and share with your provider. Update the information when medications are discontinued, doses are changed, or new medications (including over-the-counter products) are added; and carry medication information at all times in the event of emergency situations     Allergies:  No Known Allergies          Medications  Valid as of: February 13, 2017 -  1:36 PM    Generic Name Brand Name Tablet Size Instructions for use    Aspirin (Tablet Delayed Response) aspirin 81 MG Take 1 Tab by mouth every day.        Atorvastatin Calcium (Tab) LIPITOR 40 MG Take 1 Tab by mouth every evening.        Carvedilol (Tab) COREG 12.5 MG Take 1 Tab by mouth 2 times a day, with meals.        Cholecalciferol (Tab) cholecalciferol 1000 UNIT Take 1,000 Units by mouth every day.        Ferrous Sulfate (Tab) ferrous sulfate 325 (65 FE) MG Take 1 Tab by mouth every morning with breakfast.        Furosemide (Tab) LASIX 40 MG Take 1 Tab by mouth every day.        Insulin Glargine (Solution) LANTUS 100 UNIT/ML Inject 17 Units as instructed every evening.        Lisinopril (Tab) PRINIVIL 20 MG Take 1 Tab by mouth every day.        Misc. Devices (Misc) Misc. Devices  One touch test strips to use BID on insulin.        Multiple Vitamins-Minerals   Take  by mouth.        Omeprazole (CAPSULE DELAYED RELEASE) PRILOSEC 20 MG Take 1 Cap by mouth every day.        Potassium Chloride Cheyanne CR (Tab CR) K-DUR 10 MEQ Take 1 Tab by mouth every day.        Spironolactone (Tab) ALDACTONE 25 MG Take 1 Tab by mouth every day.        .                 Medicines prescribed today were sent to:     [facility] [address] [place] [address] - [address]     [address] [facility] [address]    Phone: [phone] Fax: [phone]    Open 24 Hours?: No      Medication refill instructions:       If your prescription bottle indicates you have medication  refills left, it is not necessary to call your provider’s office. Please contact your pharmacy and they will refill your medication.    If your prescription bottle indicates you do not have any refills left, you may request refills at any time through one of the following ways: The online Vivogig system (except Urgent Care), by calling your provider’s office, or by asking your pharmacy to contact your provider’s office with a refill request. Medication refills are processed only during regular business hours and may not be available until the next business day. Your provider may request additional information or to have a follow-up visit with you prior to refilling your medication.   *Please Note: Medication refills are assigned a new Rx number when refilled electronically. Your pharmacy may indicate that no refills were authorized even though a new prescription for the same medication is available at the pharmacy. Please request the medicine by name with the pharmacy before contacting your provider for a refill.           Vivogig Access Code: [email or web address]  Expires: 2/27/2017  9:47 AM    Vivogig  A secure, online tool to manage your health information     [employer]’s Vivogig® is a secure, online tool that connects you to your personalized health information from the privacy of your home -- day or night - making it very easy for you to manage your healthcare. Once the activation process is completed, you can even access your medical information using the Vivogig brinda, which is available for free in the [employer] Brinda store or Google Play store.     Vivogig provides the following levels of access (as shown below):   My Chart Features   [facility] Doctor [facility]  Specialists [facility]  Urgent  Care Non-[facility]  Primary Care  Doctor   Email your healthcare team securely and privately 24/7 X X X    Manage appointments: schedule your next appointment; view details of past/upcoming appointments X      Request  prescription refills. X      View recent personal medical records, including lab and immunizations X X X X   View health record, including health history, allergies, medications X X X X   Read reports about your outpatient visits, procedures, consult and ER notes X X X X   See your discharge summary, which is a recap of your hospital and/or ER visit that includes your diagnosis, lab results, and care plan. X X       How to register for [employer] [place]:  1. Go to  [email or web address].  2. Click on the Sign Up Now box, which takes you to the New Member Sign Up page. You will need to provide the following information:  a. Enter your [employer] Access Code exactly as it appears at the top of this page. (You will not need to use this code after you’ve completed the sign-up process. If you do not sign up before the expiration date, you must request a new code.)   b. Enter your date of birth.   c. Enter your home email address.   d. Click Submit, and follow the next screen’s instructions.  3. Create a [employer] [place] ID. This will be your [employer] login ID and cannot be changed, so think of one that is secure and easy to remember.  4. Create a [employer] password. You can change your password at any time.  5. Enter your Password Reset Question and Answer. This can be used at a later time if you forget your password.   6. Enter your e-mail address. This allows you to receive e-mail notifications when new information is available in [employer].  7. Click Sign Up. You can now view your health information.    For assistance activating your [employer] account, call [phone]

## 2017-02-13 NOTE — TELEPHONE ENCOUNTER
Pt in to see SM, APN. Per SM, APN pt to be give return to work note for 2/15/17 with no lifting >50lbs.  Letter given to pt.

## 2017-02-13 NOTE — PROGRESS NOTES
Subjective:   Abner Torres is a 56 y.o. male who presents today to follow-up on congestive heart failure. He has an extensive medical history starting back in October when he underwent bypass. He had a very complicated course with pleural effusions and required pleurodesis.    I last saw him 2 weeks ago which time he was still fluid overloaded. I had him increase Lasix and potassium to double dose for a few days and then adjust as needed based on weight. He has needed to take extra doses of Lasix 3 or 4 times since I last saw him. He is eating some processed food therefore maybe getting a fair amount of sodium.    He continues to do walking and will take the bus to the mall to walk if need be. He tolerates walking on flat surface without any shortness of breath. He has no orthopnea or PND. He continues to have some ankle edema and is wearing compression stockings for this.    He is anxious to return to work as a . He feels as though he can do his job as he does not have any lifting at work. He states his boss is very willing to work with him.    Past Medical History   Diagnosis Date   • Diabetes    • Hypertension    • CHF (congestive heart failure) (CMS-HCC)    • Hyperlipidemia      Past Surgical History   Procedure Laterality Date   • Incision and drainage general  5/1/2013     Performed by Herbert Serrano M.D. at [facility]   • Debridement  5/1/2013     Performed by Herbert Serrano M.D. at [facility]   • Debridement  5/22/2013     Performed by Herbert Serrano M.D. at [facility]   • Multiple coronary artery bypass endo vein harvest  10/18/2016     Procedure: MULTIPLE CORONARY ARTERY BYPASS ENDO VEIN HARVEST X4 WITH TROY;  Surgeon: Keith Rojas M.D.;  Location: [facility];  Service:    • Thoracoscopy Left 12/5/2016     Procedure: THORACOSCOPY  WITH PLEURODESIS;  Surgeon: John H Ganser, M.D.;  Location: [facility];  Service:     • Chest tube insertion Right 12/5/2016     Procedure: CHEST TUBE INSERTION;  Surgeon: John H Ganser, M.D.;  Location: [facility];  Service:    • Other cardiac surgery  10/2016     Family History   Problem Relation Age of Onset   • Diabetes Sister    • Diabetes Brother    • Heart Disease Brother 48     heart attacks   • Heart Disease Father 50     heart attack     History   Smoking status   • Never Smoker    Smokeless tobacco   • Never Used     No Known Allergies  Outpatient Encounter Prescriptions as of 2/13/2017   Medication Sig Dispense Refill   • Multiple Vitamins-Minerals (CENTRUM ADULTS PO) Take  by mouth.     • vitamin D (CHOLECALCIFEROL) 1000 UNIT Tab Take 1,000 Units by mouth every day.     • furosemide (LASIX) 40 MG Tab Take 1 Tab by mouth every day. 120 Tab 3   • potassium chloride SA (K-DUR) 10 MEQ Tab CR Take 1 Tab by mouth every day. 120 Tab 3   • carvedilol (COREG) 12.5 MG Tab Take 1 Tab by mouth 2 times a day, with meals. 60 Tab 11   • Misc. Devices Misc One touch test strips to use BID on insulin. 60 Each 11   • atorvastatin (LIPITOR) 40 MG Tab Take 1 Tab by mouth every evening. 30 Tab 11   • lisinopril (PRINIVIL) 20 MG Tab Take 1 Tab by mouth every day. 30 Tab 11   • spironolactone (ALDACTONE) 25 MG Tab Take 1 Tab by mouth every day. 30 Tab 11   • omeprazole (PRILOSEC) 20 MG delayed-release capsule Take 1 Cap by mouth every day. 30 Cap 11   • aspirin EC 81 MG EC tablet Take 1 Tab by mouth every day. 30 Tab 3   • insulin glargine (LANTUS) 100 UNIT/ML Solution Inject 17 Units as instructed every evening. 10 mL 1   • [DISCONTINUED] carvedilol (COREG) 6.25 MG Tab Take 1 Tab by mouth 2 times a day, with meals. 60 Tab 11   • [DISCONTINUED] furosemide (LASIX) 40 MG Tab Take 1 Tab by mouth every day. 100 Tab 3   • [DISCONTINUED] potassium chloride SA (K-DUR) 10 MEQ Tab CR Take 1 Tab by mouth every day. 100 Tab 3   • ferrous sulfate 325 (65 FE) MG tablet Take 1 Tab by mouth every morning with  "breakfast. 30 Tab 3     No facility-administered encounter medications on file as of 2/13/2017.     Review of Systems   Constitutional: Positive for malaise/fatigue.   Respiratory: Positive for shortness of breath (rare when walking.). Negative for cough and sputum production.    Cardiovascular: Positive for leg swelling. Negative for chest pain, palpitations, orthopnea, claudication and PND.   Gastrointestinal: Negative for abdominal pain.   Musculoskeletal: Negative for myalgias.   Neurological: Negative for dizziness and weakness.        Objective:   /76 mmHg  Pulse 90  Ht 1.854 m (6' 0.99\")  Wt 94.348 kg (208 lb)  BMI 27.45 kg/m2  SpO2 95%    Physical Exam   Constitutional: He is oriented to person, place, and time. He appears well-developed and well-nourished.   HENT:   Head: Normocephalic.   Eyes: Conjunctivae are normal.   Neck: No JVD present. No thyromegaly present.   Cardiovascular: Normal rate and regular rhythm.  Exam reveals gallop and S3.    Pulmonary/Chest: Effort normal. He has no wheezes. He has rales (bibasilar ).   Abdominal: Soft. Bowel sounds are normal. He exhibits no distension. There is no tenderness.   Musculoskeletal: He exhibits edema (2+ pretibial bilaterally.).   Neurological: He is alert and oriented to person, place, and time.   Skin: Skin is warm and dry.   Psychiatric: He has a normal mood and affect.       Assessment:     1. Chronic systolic congestive heart failure, NYHA class 4 (CMS-[facility])  furosemide (LASIX) 40 MG Tab    potassium chloride SA (K-DUR) 10 MEQ Tab CR   2. Ischemic cardiomyopathy  carvedilol (COREG) 12.5 MG Tab   3. 3-vessel coronary artery disease     4. Mixed hyperlipidemia         Medical Decision Making:  Today's Assessment / Status / Plan:     Congestive heart failure: His fluid status looks improved but he still is fluid overloaded. I will have him double up his Lasix and potassium for another 2 days and then adjust based on weight.    Ischemic " cardiomyopathy: His blood pressure is high enough that we can increase the carvedilol to 12.5 mg twice a day.    Coronary artery disease: No anginal symptoms. It seems he is doing well with the bypass.    Hyperlipidemia: We will need to check lipids soon.    He wants to return to work this and is a can since his short-term disability is used up. He states he is broke financially. He feels so he can do his job as a . Since he does not have to do any lifting and is relatively stable I am agreeable for him to return to work. He is not to do any lifting greater than 50 pounds.    He will follow-up with myself in 2 weeks to manage his fluid status. Sooner if problems.    Collaborating Provider: Dr Perdue.

## 2017-02-27 PROBLEM — I25.10 CAD IN NATIVE ARTERY: Status: ACTIVE | Noted: 2017-01-01

## 2017-02-27 PROBLEM — E11.00 HYPEROSMOLAR NON-KETOTIC STATE IN PATIENT WITH TYPE 2 DIABETES MELLITUS (HCC): Status: ACTIVE | Noted: 2017-01-01

## 2017-02-27 PROBLEM — D72.829 LEUKOCYTOSIS: Status: ACTIVE | Noted: 2017-01-01

## 2017-02-27 PROBLEM — E11.65 UNCONTROLLED DIABETES MELLITUS WITH HYPERGLYCEMIA (HCC): Status: ACTIVE | Noted: 2017-01-01

## 2017-02-27 NOTE — IP AVS SNAPSHOT
3/4/2017          Abner Torres  [address]  [place] [address]    Dear Abner:    [facility] wants to ensure your discharge home is safe and you or your loved ones have had all your questions answered regarding your care after you leave the hospital.    You may receive a telephone call within two days of your discharge.  This call is to make certain you understand your discharge instructions as well as ensure we provided you with the best care possible during your stay with us.     The call will only last approximately 3-5 minutes and will be done by a nurse.    Once again, we want to ensure your discharge home is safe and that you have a clear understanding of any next steps in your care.  If you have any questions or concerns, please do not hesitate to contact us, we are here for you.  Thank you for choosing [facility] for your healthcare needs.    Sincerely,    Edy Ayala    [facility]

## 2017-02-27 NOTE — IP AVS SNAPSHOT
" <p align=\"LEFT\"><IMG SRC=\"//[email or web address]$/[email or web address]\" alt=\"Image\" WIDTH=\"50%\" HEIGHT=\"200\" BORDER=\"\"></p>                   Name:Abner Torres  Medical Record Number:[id number]  CSN: [id number]    YOB: 1960   Age: 56 y.o.  Sex: male  HT:1.829 m (6') WT: 84 kg (185 lb 3 oz)          Admit Date: 2/27/2017     Discharge Date:   Today's Date: 3/4/2017  Attending Doctor:  Adalberto Malave M.D.                  Allergies:  Review of patient's allergies indicates no known allergies.          Your appointments     Mar 06, 2017 11:30 AM   CARE MANAGER TELEPHONE VISIT with CARE MANAGER   [address]  [place] [address] [phone]           ***IMPORTANT**** This is a phone visit only. Do not come into the office. The Care Manager will call you at the scheduled time for Care Manager Telephone Visit.            Mar 07, 2017  3:40 PM   Established Patient with LEA Otero   [facility]    [address]  [place] [id number] [phone]           You will be receiving a confirmation call a few days before your appointment from our automated call confirmation system.            Mar 20, 2017  1:00 PM   Established Patient with NNEKA Christine   [facility] [address]    [address]  [place] [address] [phone]           You will be receiving a confirmation call a few days before your appointment from our automated call confirmation system.              Follow-up Information     1. Follow up with NNEKA Christine. Schedule an appointment as soon as possible for a visit in 2 weeks.    Specialty:  Family Medicine    Why:  Follow up appointment    Contact information    [address] [place] [address] [address]  [place] [address] [phone]           Medication List      Take these Medications        Instructions    amoxicillin-clavulanate 875-125 MG Tabs   Commonly known as:  " "AUGMENTIN    Take 1 Tab by mouth every 12 hours for 7 doses.   Dose:  1 Tab       aspirin 81 MG EC tablet    Take 1 Tab by mouth every day.   Dose:  81 mg       atorvastatin 40 MG Tabs   Commonly known as:  LIPITOR    Take 1 Tab by mouth every evening.   Dose:  40 mg       carvedilol 12.5 MG Tabs   Commonly known as:  COREG    Take 1 Tab by mouth 2 times a day, with meals.   Dose:  12.5 mg       furosemide 40 MG Tabs   Commonly known as:  LASIX    Doctor's comments:  Take additional tablet as needed for weight gain of 2-3 lbs overnight or 5 lbs in a week.   Take 1 Tab by mouth every day.   Dose:  40 mg       glucose blood strip    Doctor's comments:  E11.9   1 Strip by Other route as needed (4 times a day).   Dose:  1 Each       insulin glargine 100 UNIT/ML Soln   Commonly known as:  LANTUS    Inject 15 Units as instructed every evening.   Dose:  15 Units       insulin regular 100 Unit/mL Soln   Commonly known as:  HUMULIN R    Inject 3 Units as instructed 3 times a day before meals.   Dose:  3 Units       INSULIN SYRINGE .5CC/30GX1/2\" 30G X 1/2\" 0.5 ML Misc    Doctor's comments:  E11.9   1 Each by Does not apply route 4 Times a Day,Before Meals and at Bedtime.   Dose:  1 Each       lisinopril 20 MG Tabs   Commonly known as:  PRINIVIL    Take 1 Tab by mouth every day.   Dose:  20 mg       omeprazole 20 MG delayed-release capsule   Commonly known as:  PRILOSEC    Take 1 Cap by mouth every day.   Dose:  20 mg       potassium chloride SA 10 MEQ Tbcr   Commonly known as:  K-DUR    Doctor's comments:  Take additional tablet when increasing  furosemide.   Take 1 Tab by mouth every day.   Dose:  10 mEq       spironolactone 25 MG Tabs   Commonly known as:  ALDACTONE    Take 1 Tab by mouth every day.   Dose:  25 mg       vitamin D 1000 UNIT Tabs   Commonly known as:  cholecalciferol    Take 1,000 Units by mouth every day.   Dose:  1000 Units         "

## 2017-02-27 NOTE — IP AVS SNAPSHOT
Wisair Access Code: [email or web address]  Expires: 3/28/2017 10:30 AM    Your email address is not on file at [facility].  Email Addresses are required for you to sign up for Wisair, please contact [phone] to verify your personal information and to provide your email address prior to attempting to register for Wisair.    Abner Torres  [address]  [place] [address]    Wisair  A secure, online tool to manage your health information     [facility]’s Wisair® is a secure, online tool that connects you to your personalized health information from the privacy of your home -- day or night - making it very easy for you to manage your healthcare. Once the activation process is completed, you can even access your medical information using the Wisair brinda, which is available for free in the Apple Brinda store or Google Play store.     To learn more about Wisair, visit [email or web address]    There are two levels of access available (as shown below):   My Chart Features  [facility] Primary Care Doctor [facility]  Specialists [facility]  Urgent  Care Non-[facility] Primary Care Doctor   Email your healthcare team securely and privately 24/7 X X X    Manage appointments: schedule your next appointment; view details of past/upcoming appointments X      Request prescription refills. X      View recent personal medical records, including lab and immunizations X X X X   View health record, including health history, allergies, medications X X X X   Read reports about your outpatient visits, procedures, consult and ER notes X X X X   See your discharge summary, which is a recap of your hospital and/or ER visit that includes your diagnosis, lab results, and care plan X X  X     How to register for Wisair:  Once your e-mail address has been verified, follow the following steps to sign up for [employer].     1. Go to  [email or web address]  2. Click on the Sign Up Now box, which takes you to the New Member Sign Up page. You  will need to provide the following information:  a. Enter your VuCOMP Access Code exactly as it appears at the top of this page. (You will not need to use this code after you’ve completed the sign-up process. If you do not sign up before the expiration date, you must request a new code.)   b. Enter your date of birth.   c. Enter your home email address.   d. Click Submit, and follow the next screen’s instructions.  3. Create a Fast Societyt ID. This will be your VuCOMP login ID and cannot be changed, so think of one that is secure and easy to remember.  4. Create a VuCOMP password. You can change your password at any time.  5. Enter your Password Reset Question and Answer. This can be used at a later time if you forget your password.   6. Enter your e-mail address. This allows you to receive e-mail notifications when new information is available in VuCOMP.  7. Click Sign Up. You can now view your health information.    For assistance activating your VuCOMP account, call [phone]

## 2017-02-27 NOTE — IP AVS SNAPSHOT
Home Care Instructions                                                                                                                  Name:Abner Torres  Medical Record Number:[id number]  CSN: [id number]    YOB: 1960   Age: 56 y.o.  Sex: male  HT:1.829 m (6') WT: 84 kg (185 lb 3 oz)          Admit Date: 2/27/2017     Discharge Date:   Today's Date: 3/4/2017  Attending Doctor:  Adalberto Malave M.D.                  Allergies:  Review of patient's allergies indicates no known allergies.            Discharge Instructions       Discharge Instructions    Discharged to home by taxi with self. Discharged via wheelchair, hospital escort: Yes.  Special equipment needed: Not Applicable    Be sure to schedule a follow-up appointment with your primary care doctor or any specialists as instructed.     Discharge Plan:   Diet Plan: Discussed  Activity Level: Discussed  Confirmed Follow up Appointment: Patient to Call and Schedule Appointment  Confirmed Symptoms Management: Discussed  Medication Reconciliation Updated: Yes  Influenza Vaccine Indication: Patient Refuses    I understand that a diet low in cholesterol, fat, and sodium is recommended for good health. Unless I have been given specific instructions below for another diet, I accept this instruction as my diet prescription.   Other diet: regular    Special Instructions: None    · Is patient discharged on Warfarin / Coumadin?   No     · Is patient Post Blood Transfusion?  No    Depression / Suicide Risk    As you are discharged from this [facility] facility, it is important to learn how to keep safe from harming yourself.    Recognize the warning signs:  · Abrupt changes in personality, positive or negative- including increase in energy   · Giving away possessions  · Change in eating patterns- significant weight changes-  positive or negative  · Change in sleeping patterns- unable to sleep or sleeping all the time   · Unwillingness or inability to  communicate  · Depression  · Unusual sadness, discouragement and loneliness  · Talk of wanting to die  · Neglect of personal appearance   · Rebelliousness- reckless behavior  · Withdrawal from people/activities they love  · Confusion- inability to concentrate     If you or a loved one observes any of these behaviors or has concerns about self-harm, here's what you can do:  · Talk about it- your feelings and reasons for harming yourself  · Remove any means that you might use to hurt yourself (examples: pills, rope, extension cords, firearm)  · Get professional help from the community (Mental Health, Substance Abuse, psychological counseling)  · Do not be alone:Call your Safe Contact- someone whom you trust who will be there for you.  · Call your local CRISIS HOTLINE 756-9259 or 047-417-8190  · Call your local Children's Mobile Crisis Response Team [place] (782) 612-8842 or www.Mimiboard  · Call the toll free National Suicide Prevention Hotlines   · National Suicide Prevention Lifeline 860-126-BVSD (6264)  · The [place] Line Network 800-SUICIDE (593-6245)        Your appointments     Mar 06, 2017 11:30 AM   CARE MANAGER TELEPHONE VISIT with CARE MANAGER   [facility])    [address] [facility] [address]  [place] [address] [phone]           ***IMPORTANT**** This is a phone visit only. Do not come into the office. The Care Manager will call you at the scheduled time for Care Manager Telephone Visit.            Mar 07, 2017  3:40 PM   Established Patient with LEA Otero   [facility])    [address]  [place] [address] [phone]           You will be receiving a confirmation call a few days before your appointment from our automated call confirmation system.            Mar 20, 2017  1:00 PM   Established Patient with NNEKA Christine   [facility] [address]    [address]  [place] [address]    "  [phone]           You will be receiving a confirmation call a few days before your appointment from our automated call confirmation system.              Follow-up Information     1. Follow up with NNEKA Christine. Schedule an appointment as soon as possible for a visit in 2 weeks.    Specialty:  Family Medicine    Why:  Follow up appointment    Contact information    [address] [id number] [phone]           Discharge Medication Instructions:    Below are the medications your physician expects you to take upon discharge:    Review all your home medications and newly ordered medications with your doctor and/or pharmacist. Follow medication instructions as directed by your doctor and/or pharmacist.    Please keep your medication list with you and share with your physician.               Medication List      START taking these medications        Instructions    amoxicillin-clavulanate 875-125 MG Tabs   Last time this was given:  1 Tab on 3/4/2017  9:00 AM   Commonly known as:  AUGMENTIN    Take 1 Tab by mouth every 12 hours for 7 doses.   Dose:  1 Tab       glucose blood strip    Doctor's comments:  E11.9   1 Strip by Other route as needed (4 times a day).   Dose:  1 Each       insulin glargine 100 UNIT/ML Soln   Last time this was given:  15 Units on 3/3/2017 10:06 PM   Commonly known as:  LANTUS    Inject 15 Units as instructed every evening.   Dose:  15 Units       insulin regular 100 Unit/mL Soln   Last time this was given:  3 Units on 3/4/2017 11:28 AM   Commonly known as:  HUMULIN R    Inject 3 Units as instructed 3 times a day before meals.   Dose:  3 Units       INSULIN SYRINGE .5CC/30GX1/2\" 30G X 1/2\" 0.5 ML Misc    Doctor's comments:  E11.9   1 Each by Does not apply route 4 Times a Day,Before Meals and at Bedtime.   Dose:  1 Each         CONTINUE taking these medications        Instructions    aspirin 81 MG EC tablet   Last time this was given:  81 mg on 3/4/2017  9:15 " AM    Take 1 Tab by mouth every day.   Dose:  81 mg       atorvastatin 40 MG Tabs   Last time this was given:  40 mg on 3/3/2017 10:05 PM   Commonly known as:  LIPITOR    Take 1 Tab by mouth every evening.   Dose:  40 mg       carvedilol 12.5 MG Tabs   Last time this was given:  12.5 mg on 3/4/2017  9:15 AM   Commonly known as:  COREG    Take 1 Tab by mouth 2 times a day, with meals.   Dose:  12.5 mg       furosemide 40 MG Tabs   Commonly known as:  LASIX    Doctor's comments:  Take additional tablet as needed for weight gain of 2-3 lbs overnight or 5 lbs in a week.   Take 1 Tab by mouth every day.   Dose:  40 mg       lisinopril 20 MG Tabs   Last time this was given:  20 mg on 3/4/2017  9:15 AM   Commonly known as:  PRINIVIL    Take 1 Tab by mouth every day.   Dose:  20 mg       omeprazole 20 MG delayed-release capsule   Last time this was given:  20 mg on 3/4/2017  9:15 AM   Commonly known as:  PRILOSEC    Take 1 Cap by mouth every day.   Dose:  20 mg       potassium chloride SA 10 MEQ Tbcr   Last time this was given:  10 mEq on 3/4/2017  9:15 AM   Commonly known as:  K-DUR    Doctor's comments:  Take additional tablet when increasing  furosemide.   Take 1 Tab by mouth every day.   Dose:  10 mEq       spironolactone 25 MG Tabs   Last time this was given:  25 mg on 3/4/2017  9:15 AM   Commonly known as:  ALDACTONE    Take 1 Tab by mouth every day.   Dose:  25 mg       vitamin D 1000 UNIT Tabs   Last time this was given:  1,000 Units on 3/4/2017  9:15 AM   Commonly known as:  cholecalciferol    Take 1,000 Units by mouth every day.   Dose:  1000 Units               Instructions           Diet / Nutrition:    Follow any diet instructions given to you by your doctor or the dietician, including how much salt (sodium) you are allowed each day.    If you are overweight, talk to your doctor about a weight reduction plan.    Activity:    Remain physically active following your doctor's instructions about exercise and  activity.    Rest often.     Any time you become even a little tired or short of breath, SIT DOWN and rest.    Worsening Symptoms:    Report any of the following signs and symptoms to the doctor's office immediately:    *Pain of jaw, arm, or neck  *Chest pain not relieved by medication                               *Dizziness or loss of consciousness  *Difficulty breathing even when at rest   *More tired than usual                                       *Bleeding drainage or swelling of surgical site  *Swelling of feet, ankles, legs or stomach                 *Fever (>100ºF)  *Pink or blood tinged sputum  *Weight gain (3lbs/day or 5lbs /week)           *Shock from internal defibrillator (if applicable)  *Palpitations or irregular heartbeats                *Cool and/or numb extremities    Stroke Awareness    Common Risk Factors for Stroke include:    Age  Atrial Fibrillation  Carotid Artery Stenosis  Diabetes Mellitus  Excessive alcohol consumption  High blood pressure  Overweight   Physical inactivity  Smoking    Warning signs and symptoms of a stroke include:    *Sudden numbness or weakness of the face, arm or leg (especially on one side of the body).  *Sudden confusion, trouble speaking or understanding.  *Sudden trouble seeing in one or both eyes.  *Sudden trouble walking, dizziness, loss of balance or coordination.Sudden severe headache with no known cause.    It is very important to get treatment quickly when a stroke occurs. If you experience any of the above warning signs, call 911 immediately.                   Disclaimer         Quit Smoking / Tobacco Use:    I understand the use of any tobacco products increases my chance of suffering from future heart disease or stroke and could cause other illnesses which may shorten my life. Quitting the use of tobacco products is the single most important thing I can do to improve my health. For further information on smoking / tobacco cessation call a Toll Free Quit  Line at 1-920.660.4563 (*National Cancer [place]) or 1-747.925.6730 (American Lung Association) or you can access the web based program at www.lungusa.org.    [place] Tobacco Users Help Line:  (914) 655-2304       Toll Free: 1-251.154.7084  Quit Tobacco Program [facility] (947)306-7179    Crisis Hotline:    [place] Crisis Hotline:  0-979-UYEPQMS or 1-541.328.5909    [place] Crisis Hotline:    1-772.937.4421 or 213-821-6379    Discharge Survey:   Thank you for choosing [facility]. We hope we did everything we could to make your hospital stay a pleasant one. You may be receiving a phone survey and we would appreciate your time and participation in answering the questions. Your input is very valuable to us in our efforts to improve our service to our patients and their families.        My signature on this form indicates that:    1. I have reviewed and understand the above information.  2. My questions regarding this information have been answered to my satisfaction.  3. I have formulated a plan with my discharge nurse to obtain my prescribed medications for home.                  Disclaimer         __________________________________                     __________       ________                       Patient Signature                                                 Date                    Time

## 2017-02-28 NOTE — PROGRESS NOTES
Patient A&Ox3 this shift. Disoriented to event. Patient denies pain or the need for interventions. Patient has NS with 20meq K running at 175mL/hr per active order. Patient's last bm 2/27/17. Safety measures intact. Bed alarm activated. Hourly rounding completed. No s/sx of distress this shift     1028- FSBS 214. 6 units of insulin administered per sliding scale  1600- Patient's mentation not clearing. Patient continues to ask this RN why he is in the hospital and how he go here. Patient has good motor strength. Cranial nerve assessment intact. Patient oriented to self and place. Disoriented to situation and time. Patient though that Daniel was president. Hospitalist RN made aware.   1615- New orders obtained. Patient educated on the need for a urine specimen  1617- FSBS 156. Insulin administered per sliding scale; see eMAR

## 2017-02-28 NOTE — ED NOTES
Pt updated on room assignment and wait times for transport. Pt resting comfortably on gurney. Pt with no changes from previous assessments. Respirations are even and unlabored. Bed in lowest position, call light within reach. Pt with no further needs at this time.

## 2017-02-28 NOTE — CARE PLAN
Problem: Communication  Goal: The ability to communicate needs accurately and effectively will improve  Outcome: PROGRESSING AS EXPECTED  Patient is able to make needs known    Problem: Safety  Goal: Will remain free from injury  Outcome: PROGRESSING AS EXPECTED  Patient did not have a fall this shift. Safety measures intact. Bed alarm activated

## 2017-02-28 NOTE — CARE PLAN
"Problem: Nutritional:  Goal: Patient to verbalize or demonstrate understanding of diet  Outcome: NOT MET  Attempted to provide verbal and written diabetic diet education to patient; he stated that he was too \"out of it\" to retain any information at this time. Agreed to re-try tomorrow, handout left at bedside. RD following.        "

## 2017-02-28 NOTE — ED NOTES
ED tech to transport pt up to floor. Pt transferred in stable condition with all belongings. Floor notified.

## 2017-02-28 NOTE — H&P
CHIEF COMPLAINT:  Altered mental status.    HISTORY OF PRESENT ILLNESS:  This is a 56-year-old male with past medical   history of coronary artery disease.  He was admitted in January of this year   with a congestive heart failure exacerbation.  Apparently, after discharge, he   did not take his insulin as he did not wish to be on insulin and thought he   could manage his diabetes without it.  Patient says he was going to work and   feeling fine.  He has maybe had some polyuria, polydipsia, and polyphagia, but   otherwise, felt fine.  Was off for the weekend and went in to work today, and   was staying strange things and acting strangely at work, and so EMS was   called.  In the field, his blood sugar was greater than 1200.  He was brought   here.  The patient was hydrated, given 10 units of regular insulin.  His sugar   is still too high for Accu-Chek, but subsequent macro draw showed his sugar   had come down to 600 and he was mentating fairly normally by the time I saw   him.    PAST MEDICAL HISTORY:  Significant for coronary artery disease, type 2   diabetes, poorly controlled, systolic and diastolic heart failure, prior   medical noncompliance, and dyslipidemia.    PAST SURGICAL HISTORY:  He has had four-vessel CABG.  He has had I and D of   something on his back.    FAMILY MEDICAL HISTORY:  Positive for diabetes and heart disease.  Negative   for cancer, or stroke.    SOCIAL HISTORY:  He works driving a forklift.  He lives alone.  He does not   have any kids.  He does not smoke, drink, or use drugs.    CODE STATUS:  He does not wish to be resuscitated.  He does not wish to be   intubated or mechanically ventilated.    REVIEW OF SYSTEMS:  He denies chest pain, shortness of breath, headache,   dysuria, fever, chills, dizziness, nausea, vomiting, constipation, or   diarrhea.  He has noted that his legs seemed weaker.  He denies depression or   suicidal ideation.    PHYSICAL EXAMINATION:  GENERAL:  This is a  mildly disheveled  male.  He is oriented in all   spheres.  He is in no acute distress.  HEENT:  Pupils are equal and reactive.  Extraocular movements are intact.  His   oropharynx is clear, not overtly dry, but with decreased salivary pulling.  NECK:  His neck is supple.  LUNGS:  His lungs are clear.  CARDIOVASCULAR:  His heart has regular rate and rhythm.  ABDOMEN:  His abdomen is soft, nontender, and nondistended.  EXTREMITIES:  Show no clubbing, cyanosis, or edema.  NEUROLOGICAL:  He is grossly intact.  INITIAL VITAL SIGNS:  He was afebrile, pulse of 81, respirations 18, blood   pressure 129/84, and saturating 94% on 2 liters.    LABORATORY STUDIES AND DIAGNOSTIC IMAGING:  CBC shows a white count of 14,000,   hemoglobin 12.4, hematocrit 41.6, and platelet count 156,000.  Chemistry   profile shows a pseudohyponatremia of 118 with a glucose of 1296, chloride 88,   CO2 is 20, BUN 39, and creatinine 1.06.  His transaminases are all elevated.    SGOT is 114, SGPT is 108, alkaline phosphatase is 269, and albumin is 3.1.    His glycohemoglobin is 16.5 and troponin 0.09.  Chest x-ray shows no acute   process.    ASSESSMENT:  1.  Severe hyperglycemia secondary to noncompliance with insulin.  Patient   will be started on every 4 hours sliding scale, as well as aggressive   intravenous hydration.  We will leave him on angiotensin-converting-enzyme   inhibitor, Aldactone and potassium containing intravenous fluids as I expect   his potassium is going to come down quite a bit.  2.  Hyponatremia.  This is _____ displacement secondary to the hyperglycemia,   expect this will correct as we correct his sugars.  3.  Abnormal liver function tests.  This is probably also somehow related to   the hyperglycemia, we will monitor this with a comprehensive metabolic panel   tomorrow and continue to track this.  Need to discuss ongoing compliance with   the patient and the importance of this prior to discharge.  Plan of care was    discussed with the clinical pharmacist at the time of admission.       ____________________________________     MD SANDEEP MEEK / SHANTELLE    DD:  02/28/2017 00:01:17  DT:  02/28/2017 00:21:14    D#:  [id number]  Job#:  [id number]

## 2017-02-28 NOTE — CARE PLAN
Problem: Safety  Goal: Will remain free from injury  Bed alarm on. Bed in low and locked position. Call light and belongings within reach. Treaded socks on. Hourly rounding.     Problem: Knowledge Deficit  Goal: Knowledge of disease process/condition, treatment plan, diagnostic tests, and medications will improve  Plan of care discussed with the patient.

## 2017-02-28 NOTE — ED NOTES
"Report received. Pt resting comfortably on gurney. Assessment completed. Respirations are even and unlabored. NAD. Bed in lowest position, call light within reach. Pt with no further needs at this time. CBS via accucheck unable to read d/t \"HI\" reading. Dr. Reece notified and stat blood glucose lab ordered and drawn.     "

## 2017-02-28 NOTE — PROGRESS NOTES
Two RN skin assessment.  Patient has no skin issues. Bony prominences are pink and blanchable. Sacrum is pink and blanchable. Scars noted to mid chest and upper abdomen from heart surgery last year.

## 2017-02-28 NOTE — RESPIRATORY CARE
COPD EDUCATION by COPD CLINICAL EDUCATOR  2/28/2017 at 8:40 AM by nEriqueta Lorenzana     Patient reviewed by COPD education team. Patient does not qualify for COPD program.

## 2017-02-28 NOTE — PROGRESS NOTES
Dk from lab called at 0510 with a critical blood glucose from patients 0224 draw. POC obtained and patients fasting sugar is now down to 469 which is in the parameters that Dr Witt prescribed for the patient. No need to inform MD at this time. Insulin given per sliding scale.

## 2017-02-28 NOTE — PROGRESS NOTES
Patient arrived to the floor from the ED via cart. Walked to the bed with a steady gait. Bed alarm on. Bed in low and locked position. Treaded socks on. Admission assessment completed. Patient is alert and oriented x3, disoriented to event. Oriented to the room and the unit procedures. Plan of care discussed with the patient and questions answered. Communication board updated. Hourly rounding in place.

## 2017-02-28 NOTE — PROGRESS NOTES
Dk  from Lab called with critical result of glucose of 816. Critical lab result read back to Dk.   Dr. Witt  notified of critical lab result at 0040.  Critical lab result read back by Dr. Witt.  New orders noted.

## 2017-02-28 NOTE — PROGRESS NOTES
Patients blood sugar reading is HI.   STAT blood glucose ordered at this time. Will await results and call the MD.

## 2017-02-28 NOTE — ED PROVIDER NOTES
"ED Provider Note    Scribed for Chevy Sher M.D. by Lawadna Sultana. 2/27/2017  4:10 PM    Primary care provider: NNEKA Christine  Means of arrival: Ambulance  History obtained from: Patient and nurse  History limited by: Altered mental status/confusion    CHIEF COMPLAINT  Chief Complaint   Patient presents with   • Altered Mental Status   • High Blood Sugar     off scale for ems       HPI  Abner Torres is a 56 y.o. male with a history of diabetes mellitus who was brought into the ED by ambulance for altered mental status. The patient says he is unsure of where he was. However, someone had noticed he was acting altered so they called EMS. When EMS arrived they checked his blood sugar and it was off the scale. The patient says he has not been compliant with his medications as he is trying to control his diabetes with insulin. He says the last time he took his Insulin was \"at least\" a month ago. The patient denies any headache, nausea, vomiting, fevers, chills, chest pain, or shortness of breath.     Further history of present illness cannot be obtained due to the patient's altered mental status and confusion. Per nurse, the patient was actually at work today and walking sometime around noon. He was asking odd questions and his coworkers noticed he was altered so they called EMS.     REVIEW OF SYSTEMS  See HPI for further details. Further review of systems is unobtainable as noted above.    PAST MEDICAL HISTORY   has a past medical history of Diabetes; Hypertension; CHF (congestive heart failure) (CMS-HCC); and Hyperlipidemia.    SURGICAL HISTORY   has past surgical history that includes incision and drainage general (5/1/2013); debridement (5/1/2013); debridement (5/22/2013); multiple coronary artery bypass endo vein harvest (10/18/2016); thoracoscopy (Left, 12/5/2016); chest tube insertion (Right, 12/5/2016); and other cardiac surgery (10/2016).    SOCIAL HISTORY  Social History   Substance Use " Topics   • Smoking status: Never Smoker    • Smokeless tobacco: Never Used   • Alcohol Use: No      History   Drug Use No     FAMILY HISTORY  Family History   Problem Relation Age of Onset   • Diabetes Sister    • Diabetes Brother    • Heart Disease Brother 48     heart attacks   • Heart Disease Father 50     heart attack     CURRENT MEDICATIONS  No current facility-administered medications on file prior to encounter.     Current Outpatient Prescriptions on File Prior to Encounter   Medication Sig Dispense Refill   • vitamin D (CHOLECALCIFEROL) 1000 UNIT Tab Take 1,000 Units by mouth every day.     • furosemide (LASIX) 40 MG Tab Take 1 Tab by mouth every day. 120 Tab 3   • potassium chloride SA (K-DUR) 10 MEQ Tab CR Take 1 Tab by mouth every day. 120 Tab 3   • carvedilol (COREG) 12.5 MG Tab Take 1 Tab by mouth 2 times a day, with meals. 60 Tab 11   • atorvastatin (LIPITOR) 40 MG Tab Take 1 Tab by mouth every evening. 30 Tab 11   • lisinopril (PRINIVIL) 20 MG Tab Take 1 Tab by mouth every day. 30 Tab 11   • spironolactone (ALDACTONE) 25 MG Tab Take 1 Tab by mouth every day. 30 Tab 11   • omeprazole (PRILOSEC) 20 MG delayed-release capsule Take 1 Cap by mouth every day. 30 Cap 11   • aspirin EC 81 MG EC tablet Take 1 Tab by mouth every day. 30 Tab 3     ALLERGIES  No Known Allergies    PHYSICAL EXAM  VITAL SIGNS: /84 mmHg  Pulse 81  Temp(Src) 36.7 °C (98.1 °F)  Resp 18  Ht 1.829 m (6')  Wt 97.523 kg (215 lb)  BMI 29.15 kg/m2    Constitutional: Well developed, Well nourished, Mild distress, Non-toxic appearance.   HENT: Normocephalic, Atraumatic, Bilateral external ears normal, Dry mucous membranes, No oral exudates.   Eyes: PERRLA, EOMI, Conjunctiva normal, No discharge.   Neck: No tenderness, Supple, No stridor.   Lymphatic: No lymphadenopathy noted.   Cardiovascular: Normal heart rate, Normal rhythm.   Thorax & Lungs: Clear to auscultation bilaterally, No respiratory distress, No wheezing, No crackles.    Abdomen: Soft, No tenderness, No masses, No pulsatile masses.   Skin: Warm, Dry, No erythema, No rash.   Extremities:, No edema No cyanosis.   Musculoskeletal: No tenderness to palpation or major deformities noted.  Intact distal pulses  Neurologic: Awake, alert, but confused. Cranial nerves 2-11 intact, muscle strength 5/5 in bilateral upper and lower extremities  Psychiatric: Appears confused. Mood normal.     LABS  Labs Reviewed   CBC WITH DIFFERENTIAL - Abnormal; Notable for the following:     WBC 14.0 (*)     Hemoglobin 12.4 (*)     Hematocrit 41.6 (*)     MCH 26.4 (*)     MCHC 29.8 (*)     RDW 52.4 (*)     Platelet Count 156 (*)     Neutrophils-Polys 83.80 (*)     Lymphocytes 7.20 (*)     Neutrophils (Absolute) 11.68 (*)     Monos (Absolute) 1.12 (*)     All other components within normal limits    Narrative:     Indicate which anticoagulants the patient is on:->UNKNOWN   COMP METABOLIC PANEL - Abnormal; Notable for the following:     Sodium 118 (*)     Chloride 88 (*)     Glucose 1296 (*)     Bun 39 (*)     Calcium 8.4 (*)     AST(SGOT) 114 (*)     ALT(SGPT) 108 (*)     Alkaline Phosphatase 269 (*)     Albumin 3.1 (*)     All other components within normal limits    Narrative:     Indicate which anticoagulants the patient is on:->UNKNOWN   TROPONIN - Abnormal; Notable for the following:     Troponin I 0.09 (*)     All other components within normal limits    Narrative:     Indicate which anticoagulants the patient is on:->UNKNOWN   LIPASE    Narrative:     Indicate which anticoagulants the patient is on:->UNKNOWN   LACTIC ACID    Narrative:     Indicate which anticoagulants the patient is on:->UNKNOWN   PROTHROMBIN TIME    Narrative:     Indicate which anticoagulants the patient is on:->UNKNOWN   APTT    Narrative:     Indicate which anticoagulants the patient is on:->UNKNOWN   ESTIMATED GFR    Narrative:     Indicate which anticoagulants the patient is on:->UNKNOWN   PERIPHERAL SMEAR REVIEW    Narrative:      Indicate which anticoagulants the patient is on:->UNKNOWN   PLATELET ESTIMATE    Narrative:     Indicate which anticoagulants the patient is on:->UNKNOWN   MORPHOLOGY    Narrative:     Indicate which anticoagulants the patient is on:->UNKNOWN   DIFFERENTIAL COMMENT    Narrative:     Indicate which anticoagulants the patient is on:->UNKNOWN   All labs reviewed by me.    RADIOLOGY  DX-CHEST-PORTABLE (1 VIEW)   Final Result         1. Decreased, now small right pleural effusion. Unchanged moderate left pleural effusion.      CT-HEAD W/O   Final Result      1.  Cerebral atrophy.      2.  White matter lucencies most consistent with small vessel ischemic change versus demyelination or gliosis.      3.  Otherwise, Head CT without contrast with no acute findings. No evidence of acute cerebral  hemorrhage or mass lesion.      The radiologist's interpretation of all radiological studies have been reviewed by me.    COURSE & MEDICAL DECISION MAKING  Pertinent Labs & Imaging studies reviewed. (See chart for details)    I reviewed the patient's medical records which showed the patient was recently admitted to the ED for CHF. He was discharged 1 month ago. The patient has a history of diabetes mellitus, CHF, and had a 4 vessel bypass in October of last year.     4:10 PM - Patient seen and examined at bedside. Ordered CT head, chest x-ray, CBC, CMP, lipase, lactic acid, troponin, INR, APTT to evaluate his symptoms. The differential diagnoses include but are not limited to: hyperglycemia, TIA, seizure    5:47 PM   - I discussed the patient's case and the above findings with Dr. Reece (hospitalist) who will see and admit the patient. Care is transferred at this time.    Decision Making:  Patient hyperglycemia, altered mental status, the patient is awake and alert now, do not know if this is a TIA versus seizure like activity, the patient's sugar is 1293, gave the patient 10 units of insulin IV, discussed case with the  hospitalist for admission to hospital.    DISPOSITION:  Patient will be admitted to Dr. Reece in guarded condition.    FINAL IMPRESSION  1. Transient alteration of awareness    2. Hyperglycemia          I, Lawanda Sultana (Scribe), am scribing for, and in the presence of, Chevy Sher M.D..    Electronically signed by: Lawanda Sultana (Scribe), 2/27/2017    IChevy M.D. personally performed the services described in this documentation, as scribed by Lawanda Sultana in my presence, and it is both accurate and complete.    The note accurately reflects work and decisions made by me.  Chevy Sher  2/27/2017  5:47 PM

## 2017-02-28 NOTE — ED NOTES
Chief Complaint   Patient presents with   • Altered Mental Status   • High Blood Sugar     off scale for ems       Pt states unknown compliance with DM medication.  States no known illness recently

## 2017-02-28 NOTE — DISCHARGE PLANNING
Care Transition Team Assessment    Information Source  Orientation : Disoriented to Event  Information Given By: Patient  Who is responsible for making decisions for patient? : Patient    Readmission Evaluation  Is this a readmission?: No    Elopement Risk  Legal Hold: No  Ambulatory or Self Mobile in Wheelchair: Yes  Disoriented: No  Psychiatric Symptoms: None  History of Wandering: No  Elopement this Admit: No  Vocalizing Wanting to Leave: No  Displays Behaviors, Body Language Wanting to Leave: No-Not at Risk for Elopement  Elopement Risk: Not at Risk for Elopement    Interdisciplinary Discharge Planning  Does Admitting Nurse Feel This Could be a Complex Discharge?: No  Lives with - Patient's Self Care Capacity: Alone and Able to Care For Self  Support Systems: Family Member(s)  Housing / Facility: 1 Story Apartment / Condo  Do You Take your Prescribed Medications Regularly: No  Reasons Why Not Taking Medications :  (pt is not sure why)  Able to Return to Previous ADL's: Yes  Mobility Issues: No  Prior Services: None  Patient Expects to be Discharged to:: home  Assistance Needed: No  Durable Medical Equipment: Not Applicable    Discharge Preparedness  What is your plan after discharge?: Uncertain - pending medical team collaboration  What are your discharge supports?: Other (comment) (Friends/Family)  Prior Functional Level: Ambulatory, Drives Self, Independent with Activities of Daily Living, Independent with Medication Management  Difficulity with ADLs: None  Difficulity with IADLs: None    Functional Assesment  Prior Functional Level: Ambulatory, Drives Self, Independent with Activities of Daily Living, Independent with Medication Management    Finances  Financial Barriers to Discharge: No  Prescription Coverage: Yes    Vision / Hearing Impairment  Vision Impairment : Yes  Right Eye Vision: Wears Glasses  Left Eye Vision: Wears Glasses  Hearing Impairment : No         Advance Directive  Advance Directive?:  None  Advance Directive offered?: AD Booklet refused    Domestic Abuse  Have you ever been the victim of abuse or violence?: No  Physical Abuse or Sexual Abuse: No  Verbal Abuse or Emotional Abuse: No  Possible Abuse Reported to:: Not Applicable    Psychological Assessment  History of Substance Abuse: None  History of Psychiatric Problems: No  Non-compliant with Treatment: No  Newly Diagnosed Illness: No    Discharge Risks or Barriers  Discharge risks or barriers?: Non-adherence to medication or treatment  Patient risk factors: Noncompliance    Anticipated Discharge Information  Anticipated discharge disposition: Home  Discharge Address:  ([address] [place] [address] [address])  Discharge Contact Phone Number:  ([phone])

## 2017-03-01 NOTE — CONSULTS
"Diabetes education: Met with Abner briefly to discuss diabetes management. Pt known from last admit, though visits were at the beginning of his stay (10/10/16 ) and middle (10/17/16 ) with him being transferred to rehab on 111/10/16.  Pt states he cannot remember how he manages his diabetes at home or what he used. Pt struggled with answering questions and repeated \"I don't know\".  Plan: Pt not appropriate for education at this time. CDE will attempt again tomorrow. Blood sugar on admit was 1296 (2/27/17) with last blood sugar before that was 271 (1/23/17).  "

## 2017-03-01 NOTE — PROGRESS NOTES
Received report at bedside and assumed care of patient at 1900. Pt resting comfortably in bed at this time. AxOx3, bed alarm in place, bed in low and locked position, call light within reach and used appropriately, non-slip socks on patient, hourly rounding in place. Pt in no signs of distress at this time.

## 2017-03-01 NOTE — CARE PLAN
Problem: Communication  Goal: The ability to communicate needs accurately and effectively will improve  Intervention: Reorient patient to environment as needed  Pt reoriented to situation and environment and reminded to call when in need of assistance          Problem: Safety  Goal: Will remain free from falls  Bed alarm in place, bed in low and locked position, non-slip socks on patient, hourly rounding in place.

## 2017-03-01 NOTE — PROGRESS NOTES
Patient A&Ox3. Patient disoriented to event. Patient still does not remember how long he has been in the hospsital or how he got here. Cranial nerve assessment intact. Patient has good strength in all extremities. Patient denies pain or the need for interventions. Patient last bm 3/1/17. Safety measures intact. Bed alarm activated. Hourly rounding completed. No s/sx of distress this shift.     1105- FSBS 227 6 units of insulin administered per scale   1645- FSBS 203 6 units of insulin administered per scale  1741- Patient off the floor with transport to [facility]  1819- Patient back from [facility]. Fluids restarted

## 2017-03-01 NOTE — PROGRESS NOTES
"Hospital Medicine Interval Note  Date of Service:  2/28/2017    Chief Complaint  56 y.o.-year-old male [facility]    Interval Problem Update  2/28 - sugars a little better, today feels \"out of it,\" following finger sticks. No signs of infection, no cough, questions answered. No real complaints.     Consultants/Specialty  None    Disposition  Home when insulin requirements determined.      Review of Systems   Constitutional: Negative for fever and chills.   Respiratory: Negative for cough and shortness of breath.    Cardiovascular: Negative for chest pain and palpitations.   Gastrointestinal: Negative for nausea, vomiting, abdominal pain, diarrhea and constipation.   Genitourinary: Negative for dysuria.   Musculoskeletal: Negative for myalgias.   Skin: Negative for itching.   Neurological: Positive for weakness. Negative for dizziness, focal weakness and headaches.        \"out of it\"   All other systems reviewed and are negative.     Physical Exam Laboratory/Imaging   Filed Vitals:    02/28/17 0400 02/28/17 0800 02/28/17 0813 02/28/17 1600   BP: 139/89 99/62 104/64 137/52   Pulse: 71 68  78   Temp: 36.3 °C (97.3 °F) 36.7 °C (98 °F)  36.8 °C (98.2 °F)   Resp: 18 18  18   Height:       Weight:       SpO2: 93% 95%  94%     Physical Exam   Constitutional: He is oriented to person, place, and time. He appears well-developed and well-nourished.   HENT:   Head: Normocephalic and atraumatic.   Mouth/Throat: Oropharynx is clear and moist.   Eyes: Conjunctivae and EOM are normal. Pupils are equal, round, and reactive to light. No scleral icterus.   Neck: Normal range of motion. Neck supple. No tracheal deviation present. No thyromegaly present.   Cardiovascular: Normal rate, regular rhythm, normal heart sounds and intact distal pulses.    No murmur heard.  Pulmonary/Chest: Effort normal and breath sounds normal. No respiratory distress. He has no wheezes.   Abdominal: Soft. Bowel sounds are normal. He " exhibits no distension. There is no tenderness.   Musculoskeletal: Normal range of motion. He exhibits no edema or tenderness.   Lymphadenopathy:     He has no cervical adenopathy.        Right: No supraclavicular adenopathy present.        Left: No supraclavicular adenopathy present.   Neurological: He is alert and oriented to person, place, and time. No cranial nerve deficit.   Skin: Skin is warm and dry.   Vitals reviewed.   Lab Results   Component Value Date/Time    WBC 13.1* 02/28/2017 02:24 AM    HEMOGLOBIN 11.6* 02/28/2017 02:24 AM    HEMATOCRIT 36.3* 02/28/2017 02:24 AM    PLATELET COUNT 164 02/28/2017 02:24 AM     Lab Results   Component Value Date/Time    SODIUM 131* 02/28/2017 02:24 AM    POTASSIUM 4.0 02/28/2017 02:24 AM    CHLORIDE 101 02/28/2017 02:24 AM    CO2 23 02/28/2017 02:24 AM    GLUCOSE 635* 02/28/2017 02:24 AM    BUN 34* 02/28/2017 02:24 AM    CREATININE 0.85 02/28/2017 02:24 AM      Assessment/Plan    * Hyperosmolar non-ketotic state in patient with type 2 diabetes mellitus (CMS-[facility])  Assessment & Plan  Much improved, treating with SSI, calculating 24-hour insulin requirements    Leukocytosis  Assessment & Plan  Probably reactive    Uncontrolled diabetes mellitus with hyperglycemia (CMS-[facility])  Assessment & Plan  In-hospital FSBG goal less than 180 mg/dL  SSI qAC & qHS when eating, q6 when NPO  GLUCOSE - ACCU-CK   Date/Time Value Ref Range Status   02/28/2017 10:25 * 65 - 99 mg/dL Final   02/28/2017 05:08 * 65 - 99 mg/dL Final   02/27/2017 10:34 PM >600* 65 - 99 mg/dL Final     Comment:     Ordered Stat Glucose  Notified Physician  Followed Dr akhtar     02/27/2017 07:24 PM >600* 65 - 99 mg/dL Final     Comment:     Followed Dr akhtar       CAD in native artery  Assessment & Plan  Medically managed     Labs reviewed and Medications reviewed        DVT Prophylaxis: Enoxaparin (Lovenox)    Ulcer prophylaxis: Not indicated

## 2017-03-01 NOTE — PROGRESS NOTES
Diabetes education: Attempted to meet with pt this afternoon. Please see consult note.  Plan: Pt not appropriate for education at this time. CDE will attempt again tomorrow. Blood sugar on admit was 1296 (2/27/17) with last blood sugar before that was 271 (1/23/17). Hg A1c is 16.5%.

## 2017-03-01 NOTE — ASSESSMENT & PLAN NOTE
In-hospital FSBG goal less than 180 mg/dL  SSI qAC & qHS when eating, q6 when NPO  GLUCOSE - ACCU-CK   Date/Time Value Ref Range Status   03/01/2017 11:02 * 65 - 99 mg/dL Final   03/01/2017 05:11 * 65 - 99 mg/dL Final   02/28/2017 08:11 * 65 - 99 mg/dL Final   02/28/2017 04:04 * 65 - 99 mg/dL Final

## 2017-03-01 NOTE — ASSESSMENT & PLAN NOTE
Active Orders     Ordered     Ordering Provider       Fri Mar 3, 2017  8:01 AM    03/03/17 0801  amoxicillin-clavulanate (AUGMENTIN) 875-125 MG per tablet 1 Tab   EVERY 12 HOURS      Adalberto Malave M.D.      Treat x 5 days for ? bronchitis

## 2017-03-02 PROBLEM — E11.9 DIABETES MELLITUS TYPE 2 IN NONOBESE (HCC): Status: ACTIVE | Noted: 2017-01-01

## 2017-03-02 PROBLEM — E11.65 UNCONTROLLED DIABETES MELLITUS WITH HYPERGLYCEMIA (HCC): Status: RESOLVED | Noted: 2017-01-01 | Resolved: 2017-01-01

## 2017-03-02 PROBLEM — D72.829 LEUKOCYTOSIS: Status: RESOLVED | Noted: 2017-01-01 | Resolved: 2017-01-01

## 2017-03-02 PROBLEM — E11.00 HYPEROSMOLAR NON-KETOTIC STATE IN PATIENT WITH TYPE 2 DIABETES MELLITUS (HCC): Status: RESOLVED | Noted: 2017-01-01 | Resolved: 2017-01-01

## 2017-03-02 NOTE — CARE PLAN
Problem: Bowel/Gastric:  Goal: Normal bowel function is maintained or improved  Outcome: PROGRESSING AS EXPECTED  Patient has regular BMs.

## 2017-03-02 NOTE — CARE PLAN
Problem: Communication  Goal: The ability to communicate needs accurately and effectively will improve  Outcome: PROGRESSING AS EXPECTED  Patient communicates clearly and effectively

## 2017-03-02 NOTE — CARE PLAN
Problem: Safety  Goal: Will remain free from injury  Outcome: PROGRESSING AS EXPECTED  No falls or injuries noted at this time. Bed alarm activated.     Problem: Medication  Goal: Compliance with prescribed medication will improve  Outcome: PROGRESSING AS EXPECTED    Problem: Mobility  Goal: Risk for activity intolerance will decrease  Outcome: PROGRESSING AS EXPECTED  Patient ambulating with assistance.

## 2017-03-02 NOTE — PROGRESS NOTES
"Hospital Medicine Interval Note  Date of Service:  3/1/2017    Chief Complaint  56 y.o.-year-old male admitted 2/27/2017 with HONK    Interval Problem Update  3/1 - feels a little less \"out of it\" and finger sticks better. Ordered MRI given no obvious cause of his abnormal mentation. No meningeal signs. Discussed plan. Questions answered.     2/28 - sugars a little better, today feels \"out of it,\" following finger sticks. No signs of infection, no cough, questions answered. No real complaints.     Consultants/Specialty  None    Disposition  Home when insulin requirements determined.      Review of Systems   Constitutional: Negative for fever and chills.   Respiratory: Negative for cough and shortness of breath.    Cardiovascular: Negative for chest pain and palpitations.   Gastrointestinal: Negative for nausea, vomiting, abdominal pain, diarrhea and constipation.   Genitourinary: Negative for dysuria.   Musculoskeletal: Negative for myalgias.   Skin: Negative for itching.   Neurological: Positive for weakness. Negative for dizziness, focal weakness and headaches.        \"out of it\"   All other systems reviewed and are negative.     Physical Exam Laboratory/Imaging   Filed Vitals:    03/01/17 0400 03/01/17 0742 03/01/17 0757 03/01/17 1514   BP: 106/73 110/83  102/68   Pulse: 62 68  64   Temp: 36.5 °C (97.7 °F) 36.7 °C (98.1 °F)  36.6 °C (97.8 °F)   Resp: 17 18  16   Height:       Weight:   84 kg (185 lb 3 oz)    SpO2: 92% 93%  94%     Physical Exam   Constitutional: He is oriented to person, place, and time. He appears well-developed and well-nourished.   HENT:   Head: Normocephalic and atraumatic.   Mouth/Throat: Oropharynx is clear and moist.   Eyes: Conjunctivae and EOM are normal. Pupils are equal, round, and reactive to light. No scleral icterus.   Neck: Normal range of motion. Neck supple. No tracheal deviation present. No thyromegaly present.   Cardiovascular: Normal rate, regular rhythm, normal heart sounds " and intact distal pulses.    No murmur heard.  Pulmonary/Chest: Effort normal and breath sounds normal. No respiratory distress. He has no wheezes.   Abdominal: Soft. Bowel sounds are normal. He exhibits no distension. There is no tenderness.   Musculoskeletal: Normal range of motion. He exhibits no edema or tenderness.   Lymphadenopathy:     He has no cervical adenopathy.        Right: No supraclavicular adenopathy present.        Left: No supraclavicular adenopathy present.   Neurological: He is alert and oriented to person, place, and time. No cranial nerve deficit.   Skin: Skin is warm and dry.   Vitals reviewed.   Lab Results   Component Value Date/Time    WBC 12.0* 03/01/2017 02:46 AM    HEMOGLOBIN 12.3* 03/01/2017 02:46 AM    HEMATOCRIT 38.2* 03/01/2017 02:46 AM    PLATELET COUNT 162* 03/01/2017 02:46 AM     Lab Results   Component Value Date/Time    SODIUM 137 03/01/2017 02:46 AM    POTASSIUM 4.8 03/01/2017 02:46 AM    CHLORIDE 111 03/01/2017 02:46 AM    CO2 21 03/01/2017 02:46 AM    GLUCOSE 160* 03/01/2017 02:46 AM    BUN 45* 03/01/2017 02:46 AM    CREATININE 1.23 03/01/2017 02:46 AM      Assessment/Plan    * Hyperosmolar non-ketotic state in patient with type 2 diabetes mellitus (CMS-HCC)  Assessment & Plan  Much improved, treating with SSI, calculating 24-hour insulin requirements    Uncontrolled diabetes mellitus with hyperglycemia (CMS-HCC)  Assessment & Plan  In-hospital FSBG goal less than 180 mg/dL  SSI qAC & qHS when eating, q6 when NPO  GLUCOSE - ACCU-CK   Date/Time Value Ref Range Status   03/01/2017 11:02 * 65 - 99 mg/dL Final   03/01/2017 05:11 * 65 - 99 mg/dL Final   02/28/2017 08:11 * 65 - 99 mg/dL Final   02/28/2017 04:04 * 65 - 99 mg/dL Final       Leukocytosis  Assessment & Plan  Probably reactive    CAD in native artery  Assessment & Plan  Medically managed       Labs reviewed and Medications reviewed        DVT Prophylaxis: Enoxaparin (Lovenox)    Ulcer  prophylaxis: Not indicated

## 2017-03-02 NOTE — PROGRESS NOTES
Diabetes education: Pt remains confused and not appropriate to assess needs or give education. CDE will continue to follow . Please call [phone] if needs change.

## 2017-03-03 PROBLEM — R60.1 ANASARCA: Status: ACTIVE | Noted: 2017-01-01

## 2017-03-03 NOTE — PROGRESS NOTES
Diabetes education: Met with Abner, before discharge, to confirm his finger stick and insulin skills. Pt more alert and able to respond to questions today.  Reviewed insulin administration, storage,shelf life and site rotation. Pt states he has a meter at home, thinks it was a one touch ( what is covered by [employer]). Pt will have a prescription for insulin vials, syringes and strips ( name of meter not listed) for discharge.  Recommend patient  meter from home to confirm what strips are needed at the pharmacy.  Reviewed need for insulin, to eat three meals and hs snack with carbohydrates and answered questions.

## 2017-03-03 NOTE — CARE PLAN
Problem: Safety  Goal: Will remain free from falls  Intervention: Implement fall precautions  Call light and personal belongings within reach. Pt instructed to call for assistance, pt verbalizes understanding. Non skid socks on. Strip alarm in place. Bed in lowest position.       Problem: Respiratory:  Goal: Respiratory status will improve  Intervention: Administer and titrate oxygen therapy  Patient is on 2L of oxygen per nc without distress.

## 2017-03-04 PROBLEM — R60.1 ANASARCA: Status: RESOLVED | Noted: 2017-01-01 | Resolved: 2017-01-01

## 2017-03-04 PROBLEM — I50.43 ACUTE ON CHRONIC COMBINED SYSTOLIC AND DIASTOLIC CONGESTIVE HEART FAILURE (HCC): Status: RESOLVED | Noted: 2017-01-01 | Resolved: 2017-01-01

## 2017-03-04 NOTE — PROGRESS NOTES
"Hospital Medicine Interval Note  Date of Service:  3/3/2017    Chief Complaint  56 y.o.-year-old male admitted 2/27/2017 with HONK    Interval Problem Update  3/3 - started on lasix infusion for anasarca and pleural effusions; advised of plan, encouraged to keep O2 on due to hypoxemia. Questions answered    3/2 - all of a sudden started having SOB and confusion today. Ordered CTA for PE.     3/1 - feels a little less \"out of it\" and finger sticks better. Ordered MRI given no obvious cause of his abnormal mentation. No meningeal signs. Discussed plan. Questions answered.     2/28 - sugars a little better, today feels \"out of it,\" following finger sticks. No signs of infection, no cough, questions answered. No real complaints.     Consultants/Specialty  None    Disposition  Home when insulin requirements determined.      Review of Systems   Constitutional: Negative for fever and chills.   Respiratory: Negative for cough and shortness of breath.    Cardiovascular: Negative for chest pain and palpitations.   Gastrointestinal: Negative for nausea, vomiting, abdominal pain, diarrhea and constipation.   Genitourinary: Negative for dysuria.   Musculoskeletal: Negative for myalgias.   Skin: Negative for itching.   Neurological: Positive for weakness. Negative for dizziness, focal weakness and headaches.        \"out of it\"   All other systems reviewed and are negative.     Physical Exam Laboratory/Imaging   Filed Vitals:    03/02/17 0800 03/02/17 1600 03/02/17 2000 03/03/17 0400   BP: 111/67 110/65 102/66 108/68   Pulse: 63 62 77 79   Temp: 36.4 °C (97.5 °F) 37 °C (98.6 °F) 36.9 °C (98.4 °F) 36.1 °C (96.9 °F)   Resp: 16 14 16 17   Height:       Weight:       SpO2: 90% 92% 99% 94%     Physical Exam   Constitutional: He is oriented to person, place, and time. He appears well-developed and well-nourished.   HENT:   Head: Normocephalic and atraumatic.   Mouth/Throat: Oropharynx is clear and moist.   Eyes: Conjunctivae and EOM are " normal. Pupils are equal, round, and reactive to light. No scleral icterus.   Neck: Normal range of motion. Neck supple. No tracheal deviation present. No thyromegaly present.   Cardiovascular: Normal rate, regular rhythm, normal heart sounds and intact distal pulses.    No murmur heard.  Pulmonary/Chest: Effort normal and breath sounds normal. No respiratory distress. He has no wheezes.   Abdominal: Soft. Bowel sounds are normal. He exhibits no distension. There is no tenderness.   Musculoskeletal: Normal range of motion. He exhibits no edema or tenderness.   Lymphadenopathy:     He has no cervical adenopathy.        Right: No supraclavicular adenopathy present.        Left: No supraclavicular adenopathy present.   Neurological: He is alert and oriented to person, place, and time. No cranial nerve deficit.   Skin: Skin is warm and dry.   Vitals reviewed.   Lab Results   Component Value Date/Time    WBC 12.3* 03/02/2017 08:24 AM    HEMOGLOBIN 13.5* 03/02/2017 08:24 AM    HEMATOCRIT 43.4 03/02/2017 08:24 AM    PLATELET COUNT 191 03/02/2017 08:24 AM     Lab Results   Component Value Date/Time    SODIUM 141 03/02/2017 08:24 AM    POTASSIUM 5.3 03/02/2017 08:24 AM    CHLORIDE 115* 03/02/2017 08:24 AM    CO2 22 03/02/2017 08:24 AM    GLUCOSE 141* 03/02/2017 08:24 AM    BUN 49* 03/02/2017 08:24 AM    CREATININE 1.13 03/02/2017 08:24 AM      Assessment/Plan    Diabetes mellitus type 2 in nonobese (CMS-HCC)  Assessment & Plan  In-hospital FSBG goal less than 180 mg/dL  SSI qAC & qHS when eating, q6 when NPO  GLUCOSE - ACCU-CK   Date/Time Value Ref Range Status   03/03/2017 11:48 * 65 - 99 mg/dL Final   03/03/2017 08:00 * 65 - 99 mg/dL Final   03/03/2017 05:15 * 65 - 99 mg/dL Final   03/02/2017 08:30 * 65 - 99 mg/dL Final       Anasarca  Assessment & Plan  Diuresis, suspect will need home diuretics for discharge    Leukocytosis  Assessment & Plan  Active Orders     Ordered     Ordering Provider        Fri Mar 3, 2017  8:01 AM    03/03/17 0801  amoxicillin-clavulanate (AUGMENTIN) 875-125 MG per tablet 1 Tab   EVERY 12 HOURS      Adalberto Malave M.D.      Treat x 5 days for ? bronchitis    CAD in native artery  Assessment & Plan  Medically managed       Labs reviewed and Medications reviewed        DVT Prophylaxis: Enoxaparin (Lovenox)    Ulcer prophylaxis: Not indicated

## 2017-03-04 NOTE — DISCHARGE INSTRUCTIONS
Discharge Instructions    Discharged to home by taxi with self. Discharged via wheelchair, hospital escort: Yes.  Special equipment needed: Not Applicable    Be sure to schedule a follow-up appointment with your primary care doctor or any specialists as instructed.     Discharge Plan:   Diet Plan: Discussed  Activity Level: Discussed  Confirmed Follow up Appointment: Patient to Call and Schedule Appointment  Confirmed Symptoms Management: Discussed  Medication Reconciliation Updated: Yes  Influenza Vaccine Indication: Patient Refuses    I understand that a diet low in cholesterol, fat, and sodium is recommended for good health. Unless I have been given specific instructions below for another diet, I accept this instruction as my diet prescription.   Other diet: regular    Special Instructions: None    · Is patient discharged on Warfarin / Coumadin?   No     · Is patient Post Blood Transfusion?  No    Depression / Suicide Risk    As you are discharged from this [facility] facility, it is important to learn how to keep safe from harming yourself.    Recognize the warning signs:  · Abrupt changes in personality, positive or negative- including increase in energy   · Giving away possessions  · Change in eating patterns- significant weight changes-  positive or negative  · Change in sleeping patterns- unable to sleep or sleeping all the time   · Unwillingness or inability to communicate  · Depression  · Unusual sadness, discouragement and loneliness  · Talk of wanting to die  · Neglect of personal appearance   · Rebelliousness- reckless behavior  · Withdrawal from people/activities they love  · Confusion- inability to concentrate     If you or a loved one observes any of these behaviors or has concerns about self-harm, here's what you can do:  · Talk about it- your feelings and reasons for harming yourself  · Remove any means that you might use to hurt yourself (examples: pills, rope, extension cords, firearm)  · Get  professional help from the community (Mental Health, Substance Abuse, psychological counseling)  · Do not be alone:Call your Safe Contact- someone whom you trust who will be there for you.  · Call your local CRISIS HOTLINE 371-8173 or 728-164-9802  · Call your local Children's Mobile Crisis Response Team [place] (129) 243-9784 or www.[employer]  · Call the toll free National Suicide Prevention Hotlines   · National Suicide Prevention Lifeline 800-389-EVVS (6899)  · National Hope Line Network 800-SUICIDE (653-2009)

## 2017-03-04 NOTE — CARE PLAN
Problem: Respiratory:  Goal: Respiratory status will improve  Intervention: Educate and encourage incentive spirometry usage  Pt able to demonstrate effective use

## 2017-03-04 NOTE — PROGRESS NOTES
"Hospital Medicine Interval Note  Date of Service:  3/2/2017    Chief Complaint  56 y.o.-year-old male admitted 2/27/2017 with HONK    Interval Problem Update  3/2 - all of a sudden started having SOB and confusion today. Ordered CTA for PE.     3/1 - feels a little less \"out of it\" and finger sticks better. Ordered MRI given no obvious cause of his abnormal mentation. No meningeal signs. Discussed plan. Questions answered.     2/28 - sugars a little better, today feels \"out of it,\" following finger sticks. No signs of infection, no cough, questions answered. No real complaints.     Consultants/Specialty  None    Disposition  Home when insulin requirements determined.      Review of Systems   Constitutional: Negative for fever and chills.   Respiratory: Negative for cough and shortness of breath.    Cardiovascular: Negative for chest pain and palpitations.   Gastrointestinal: Negative for nausea, vomiting, abdominal pain, diarrhea and constipation.   Genitourinary: Negative for dysuria.   Musculoskeletal: Negative for myalgias.   Skin: Negative for itching.   Neurological: Positive for weakness. Negative for dizziness, focal weakness and headaches.        \"out of it\"   All other systems reviewed and are negative.     Physical Exam Laboratory/Imaging   Filed Vitals:    03/02/17 0800 03/02/17 1600 03/02/17 2000 03/03/17 0400   BP: 111/67 110/65 102/66 108/68   Pulse: 63 62 77 79   Temp: 36.4 °C (97.5 °F) 37 °C (98.6 °F) 36.9 °C (98.4 °F) 36.1 °C (96.9 °F)   Resp: 16 14 16 17   Height:       Weight:       SpO2: 90% 92% 99% 94%     Physical Exam   Constitutional: He is oriented to person, place, and time. He appears well-developed and well-nourished.   HENT:   Head: Normocephalic and atraumatic.   Mouth/Throat: Oropharynx is clear and moist.   Eyes: Conjunctivae and EOM are normal. Pupils are equal, round, and reactive to light. No scleral icterus.   Neck: Normal range of motion. Neck supple. No tracheal deviation " present. No thyromegaly present.   Cardiovascular: Normal rate, regular rhythm, normal heart sounds and intact distal pulses.    No murmur heard.  Pulmonary/Chest: Effort normal and breath sounds normal. No respiratory distress. He has no wheezes.   Abdominal: Soft. Bowel sounds are normal. He exhibits no distension. There is no tenderness.   Musculoskeletal: Normal range of motion. He exhibits no edema or tenderness.   Lymphadenopathy:     He has no cervical adenopathy.        Right: No supraclavicular adenopathy present.        Left: No supraclavicular adenopathy present.   Neurological: He is alert and oriented to person, place, and time. No cranial nerve deficit.   Skin: Skin is warm and dry.   Vitals reviewed.   Lab Results   Component Value Date/Time    WBC 12.3* 03/02/2017 08:24 AM    HEMOGLOBIN 13.5* 03/02/2017 08:24 AM    HEMATOCRIT 43.4 03/02/2017 08:24 AM    PLATELET COUNT 191 03/02/2017 08:24 AM     Lab Results   Component Value Date/Time    SODIUM 141 03/02/2017 08:24 AM    POTASSIUM 5.3 03/02/2017 08:24 AM    CHLORIDE 115* 03/02/2017 08:24 AM    CO2 22 03/02/2017 08:24 AM    GLUCOSE 141* 03/02/2017 08:24 AM    BUN 49* 03/02/2017 08:24 AM    CREATININE 1.13 03/02/2017 08:24 AM      Assessment/Plan    Diabetes mellitus type 2 in nonobese (CMS-HCC)  Assessment & Plan  In-hospital FSBG goal less than 180 mg/dL  SSI qAC & qHS when eating, q6 when NPO  GLUCOSE - ACCU-CK   Date/Time Value Ref Range Status   03/03/2017 11:48 * 65 - 99 mg/dL Final   03/03/2017 08:00 * 65 - 99 mg/dL Final   03/03/2017 05:15 * 65 - 99 mg/dL Final   03/02/2017 08:30 * 65 - 99 mg/dL Final       Anasarca  Assessment & Plan  Diuresis, suspect will need home diuretics for discharge    Leukocytosis  Assessment & Plan  Active Orders     Ordered     Ordering Provider       Fri Mar 3, 2017  8:01 AM    03/03/17 0801  amoxicillin-clavulanate (AUGMENTIN) 875-125 MG per tablet 1 Tab   EVERY 12 HOURS      Adalberto ANDREW  EDUIN Malave      Treat x 5 days for ? bronchitis    CAD in native artery  Assessment & Plan  Medically managed       Labs reviewed and Medications reviewed        DVT Prophylaxis: Enoxaparin (Lovenox)    Ulcer prophylaxis: Not indicated

## 2017-03-04 NOTE — PROGRESS NOTES
Patient laying in bed at the time of assessment. VSS. Patient sleeping but easily aroused. Patient denied pain at this time. Callbell within reach of patient. Bed locked and in lowest position. Bed alarm on. Plan of care discussed with patient. Patient verbalized understanding. Will continue to monitor patient closely for any changes.

## 2017-03-04 NOTE — ASSESSMENT & PLAN NOTE
In-hospital FSBG goal less than 180 mg/dL  SSI qAC & qHS when eating, q6 when NPO  GLUCOSE - ACCU-CK   Date/Time Value Ref Range Status   03/03/2017 11:48 * 65 - 99 mg/dL Final   03/03/2017 08:00 * 65 - 99 mg/dL Final   03/03/2017 05:15 * 65 - 99 mg/dL Final   03/02/2017 08:30 * 65 - 99 mg/dL Final

## 2017-03-04 NOTE — PROGRESS NOTES
Patient alert and oriented.   Blood sugars stable.   Tolerating IV lasix gtt. Large amounts of urine output.   SBA to bathroom. BM today.   Bed alarm on for safety.

## 2017-03-05 NOTE — DISCHARGE SUMMARY
ADMITTING DIAGNOSES:  Hyperglycemia secondary to noncompliance with insulin,   hyponatremia, and abnormal liver function tests.    DISCHARGE DIAGNOSES:  1. Diabetes mellitus type 2, now with hyperglycemia, resolved.    2. Heart failure with reduced ejection fraction without acute exacerbation.  3. Coronary artery disease.  4. Upper respiratory tract infection.  5. Severe protein-calorie malnutrition.  6. Hyperlipidemia.  7. Ischemic cardiomyopathy  8. Medical noncompliance, counseled.    ACTIVITY:  As tolerated.    DISCHARGE MEDICATIONS:  He is to continue Augmentin 875/125 every 12 hours for   7 more doses to complete a 7-day total course for upper respiratory tract   infection and questionable pneumonitis of the lingula.  He was also given   prescriptions for bloods, glucose test strips, and insulin syringes.  He is   also taught on insulin and will be taking 15 units of Lantus in the evening   and 3 units with meals and to follow up with his primary care for evaluation   of his blood sugars.  He also continues on aspirin 81 mg daily, atorvastatin   40 mg in the evening, Coreg 12.5 mg twice daily with meals, Lasix 40 mg daily   with an extra tablet for 2-3 pounds of weight gain overnight, lisinopril 20 mg   daily, Prilosec 20 mg daily, potassium chloride 10 mEq daily, spironolactone   25 mg daily, and vitamin D 1000 International Units daily.    FOLLOWUP:  With his primary care preferably within 1-2 weeks of hospital   discharge on 03/07/2017 at 3:40 p.m.    HISTORY OF PRESENTING ILLNESS AND HOSPITAL COURSE:  This is a 56-year-old   gentleman, who came in with severe hyperglycemia without ketoacidosis.  He was   admitted.  His blood sugars were readily controlled.  He was monitored with   adjustments made to maintain good glucose levels; 15 units of Lantus was   determined as his long-acting dose of 3 units with meals.  He was found to   have a little bit of shortness of breath and did have some anasarca.  There    was concern for pulmonary embolism; this was however ruled out with CT   angiography.  He was therefore diuresed.  He had good diuresis and was able to   return to room air and recommended to continue his home Lasix.  He also   assures me that he will be compliant with his home insulin regimen now.    Therefore, with the patient stating he felt well and he was able for discharge   to home in good and stable condition with close outpatient followup.    Total time of discharge is 36 minutes.       ____________________________________     MD JC Cherry / SHANTELLE    DD:  03/04/2017 08:08:25  DT:  03/05/2017 01:01:51    D#:  [id number]  Job#:  [id number]

## 2017-03-05 NOTE — PROGRESS NOTES
Patient discharged home via wheelchair and accompanied by transport staff. VSS at the time of discharge. Patient denied having any pain at this time. Discharge paperwork completed per hospital protocol. Discharge instructions discussed with patient and patient verbalized understanding. Prescriptions given to patient, along with all his belongings.

## 2017-03-06 NOTE — PROGRESS NOTES
· Placed discharge outreach phone call to patient s/p hospital discharge 3/4/17.  Left voicemail with my contact information and instructions to return my phone call.    · 3/6/17 at 2:15 PM--Second attempt at discharge outreach phone call to patient, spoke with pt, discharge outreach completed.

## 2017-03-06 NOTE — PROGRESS NOTES
· Chart reviewed.  Patient is scheduled for care manager telephone visit 3/6 at 11:30 AM.  Will place phone call to patient on scheduled day and time.

## 2017-03-07 PROBLEM — I50.9 CHF (CONGESTIVE HEART FAILURE) (HCC): Status: ACTIVE | Noted: 2017-01-01

## 2017-03-07 PROBLEM — R09.02 HYPOXIA: Status: RESOLVED | Noted: 2017-01-01 | Resolved: 2017-01-01

## 2017-03-07 NOTE — MR AVS SNAPSHOT
"        Abner Torres   3/7/2017 3:40 PM   Office Visit   MRN: [id number]    Department:  [facility]   Dept Phone:  [phone]    Description:  Male : 1960   Provider:  LEA Otero           Reason for Visit     Hospital Follow-up tired/weak/      Allergies as of 3/7/2017     No Known Allergies      You were diagnosed with     Hospital discharge follow-up   [172737]       Diabetes mellitus type 2 in nonobese (CMS-HCC)   [044791]       Debility   [2013]       3-vessel coronary artery disease   [562752]       Systolic congestive heart failure, unspecified congestive heart failure chronicity (CMS-HCC)   [0670120]       Ischemic cardiomyopathy   [503764]       Mixed hyperlipidemia   [272.2.ICD-9-CM]       Chronic systolic congestive heart failure, NYHA class 4 (CMS-[facility])   [327834]       Gastroesophageal reflux disease without esophagitis   [626265]         Vital Signs     Blood Pressure Pulse Temperature Respirations Height Weight    136/90 mmHg 80 36.7 °C (98.1 °F) 16 1.854 m (6' 1\") 94.802 kg (209 lb)    Body Mass Index Oxygen Saturation Smoking Status             27.58 kg/m2 90% Never Smoker          Basic Information     Date Of Birth Sex Race Ethnicity Preferred Language    1960 Male White Non- English      Your appointments     Mar 20, 2017  1:00 PM   Established Patient with NNEKA Christine   [facility] [address]    [address]  [facility] [address]  [facility] [address]   [phone]           You will be receiving a confirmation call a few days before your appointment from our automated call confirmation system.              Problem List              ICD-10-CM Priority Class Noted - Resolved    Medically noncompliant Z91.19   10/10/2016 - Present    Ventricular tachycardia (CMS-[facility]) I47.2 Medium  10/11/2016 - Present    3-vessel coronary artery disease I25.10 High  10/13/2016 - Present    Normocytic anemia D64.9 Low  10/28/2016 - Present   " Debility R53.81   11/22/2016 - Present    Protein-calorie malnutrition, severe (CMS-HCC) E43   12/1/2016 - Present    S/P CABG (coronary artery bypass graft) Z95.1   1/19/2017 - Present    Ischemic cardiomyopathy I25.5   1/20/2017 - Present    Financial difficulties Z59.8   1/20/2017 - Present    Mixed hyperlipidemia E78.2   1/30/2017 - Present    Leukocytosis D72.829 Medium  2/27/2017 - Present    CAD in native artery I25.10 Low  2/27/2017 - Present    Diabetes mellitus type 2 in nonobese (CMS-HCC) E11.9 High  3/2/2017 - Present    CHF (congestive heart failure) (CMS-HCC) I50.9   3/7/2017 - Present      Health Maintenance        Date Due Completion Dates    IMM HEP B VACCINE (1 of 3 - Primary Series) 1960 ---    DIABETES MONOFILAMENT / LE EXAM 1960 ---    IMM DTaP/Tdap/Td Vaccine (1 - Tdap) 6/5/1980 ---    COLONOSCOPY 6/5/2011 ---    URINE ACR / MICROALBUMIN 8/3/2014 8/3/2013, 4/28/2012    A1C SCREENING 8/27/2017 2/27/2017, 12/24/2016, 10/10/2016, 5/1/2013, 4/28/2012    FASTING LIPID PROFILE 10/10/2017 10/10/2016, 8/3/2013, 5/5/2013, 4/28/2012, 2/12/2009    RETINAL SCREENING 1/31/2018 1/31/2017    SERUM CREATININE 3/2/2018 3/2/2017, 3/1/2017, 2/28/2017, 2/27/2017, 1/23/2017, 1/21/2017, 1/20/2017, 1/19/2017, 1/18/2017, 1/5/2017, 1/3/2017, 12/31/2016, 12/27/2016, 12/26/2016, 12/25/2016, 12/24/2016, 12/22/2016, 12/20/2016, 12/18/2016, 12/16/2016, 12/15/2016, 12/14/2016, 12/12/2016, 12/11/2016, 12/10/2016, 12/9/2016, 12/8/2016, 12/7/2016, 12/5/2016, 12/1/2016, 11/30/2016, 11/27/2016, 11/26/2016, 11/25/2016, 11/24/2016, 11/23/2016, 11/22/2016, 11/19/2016, 11/15/2016, 11/12/2016, 11/11/2016, 11/9/2016, 11/8/2016, 11/7/2016, 11/6/2016, 11/5/2016, 11/4/2016, 11/3/2016, 11/2/2016, 10/31/2016, 10/31/2016, 10/29/2016, 10/28/2016, 10/27/2016, 10/26/2016, 10/25/2016, 10/24/2016, 10/23/2016, 10/22/2016, 10/21/2016, 10/20/2016, 10/19/2016, 10/17/2016, 10/14/2016, 10/13/2016, 10/12/2016, 10/12/2016, 10/11/2016,  10/10/2016, 10/10/2016, 10/10/2016, 10/9/2016, 10/9/2016, 10/9/2016, 8/3/2013, 5/9/2013, 5/8/2013, 5/7/2013, 5/6/2013, 5/5/2013, 5/4/2013, 5/3/2013, 5/2/2013, 5/1/2013, 4/30/2013, 4/28/2012, 2/13/2009, 2/12/2009            Current Immunizations     Influenza Vaccine Quad Inj (Pf) 10/29/2016  6:10 AM    Pneumococcal polysaccharide vaccine (PPSV-23) 5/1/2013  4:44 AM, 4/30/2013      Below and/or attached are the medications your provider expects you to take. Review all of your home medications and newly ordered medications with your provider and/or pharmacist. Follow medication instructions as directed by your provider and/or pharmacist. Please keep your medication list with you and share with your provider. Update the information when medications are discontinued, doses are changed, or new medications (including over-the-counter products) are added; and carry medication information at all times in the event of emergency situations     Allergies:  No Known Allergies          Medications  Valid as of: March 07, 2017 -  4:11 PM    Generic Name Brand Name Tablet Size Instructions for use    Amoxicillin-Pot Clavulanate (Tab) AUGMENTIN 875-125 MG Take 1 Tab by mouth every 12 hours for 7 doses.        Aspirin (Tablet Delayed Response) aspirin 81 MG Take 1 Tab by mouth every day.        Atorvastatin Calcium (Tab) LIPITOR 40 MG Take 1 Tab by mouth every evening.        Carvedilol (Tab) COREG 12.5 MG Take 1 Tab by mouth 2 times a day, with meals.        Cholecalciferol (Tab) cholecalciferol 1000 UNIT Take 1 Tab by mouth every day.        Furosemide (Tab) LASIX 40 MG Take 1 Tab by mouth every day.        Glucose Blood (Strip) glucose blood  1 Strip by Other route as needed (4 times a day).        Insulin Glargine (Solution) LANTUS 100 UNIT/ML Inject 15 Units as instructed every evening.        Insulin Regular Human (Solution) HUMULIN R 100 Unit/mL Inject 3 Units as instructed 3 times a day before meals.        Insulin  "Syringe-Needle U-100 (Misc) INSULIN SYRINGE .5CC/30GX1/2\" 30G X 1/2\" 0.5 ML 1 Each by Does not apply route 4 Times a Day,Before Meals and at Bedtime.        Lisinopril (Tab) PRINIVIL 20 MG Take 1 Tab by mouth every day.        Omeprazole (CAPSULE DELAYED RELEASE) PRILOSEC 20 MG Take 1 Cap by mouth every day.        Spironolactone (Tab) ALDACTONE 25 MG Take 1 Tab by mouth every day.        .                 Medicines prescribed today were sent to:     [facility] [address] [place] [address], [place] - [address] [place] [address]    Phone: [phone] Fax: [phone]    Open 24 Hours?: No      Medication refill instructions:       If your prescription bottle indicates you have medication refills left, it is not necessary to call your provider’s office. Please contact your pharmacy and they will refill your medication.    If your prescription bottle indicates you do not have any refills left, you may request refills at any time through one of the following ways: The online [employer] system (except Urgent Care), by calling your provider’s office, or by asking your pharmacy to contact your provider’s office with a refill request. Medication refills are processed only during regular business hours and may not be available until the next business day. Your provider may request additional information or to have a follow-up visit with you prior to refilling your medication.   *Please Note: Medication refills are assigned a new Rx number when refilled electronically. Your pharmacy may indicate that no refills were authorized even though a new prescription for the same medication is available at the pharmacy. Please request the medicine by name with the pharmacy before contacting your provider for a refill.        Referral     A referral request has been sent to our patient care coordination department. Please allow 3-5 business days for us to process this request and contact you either by phone or mail. If you do not hear " from us by the 5th business day, please call us at [phone].        Instructions    If you need further assistance, or have any questions; concerns or lingering symptoms before seeing your Primary Care Provider or specialist.     Do not hesitate to contact us.     Please contact us at the Post-Hospital Follow Up Program at [phone].   Our offices hours are Monday-Friday 8 am-5 pm.                CloudSync Access Code: [email or web address]  Expires: 3/28/2017 10:30 AM    CloudSync  A secure, online tool to manage your health information     [employer]’s CloudSync® is a secure, online tool that connects you to your personalized health information from the privacy of your home -- day or night - making it very easy for you to manage your healthcare. Once the activation process is completed, you can even access your medical information using the CloudSync brinda, which is available for free in the [employer] Brinda store or Google Play store.     CloudSync provides the following levels of access (as shown below):   My Chart Features   [facility]  Specialists [facility]  Urgent  Care Non-[facility]  Primary Care  Doctor   Email your healthcare team securely and privately 24/7 X X X    Manage appointments: schedule your next appointment; view details of past/upcoming appointments X      Request prescription refills. X      View recent personal medical records, including lab and immunizations X X X X   View health record, including health history, allergies, medications X X X X   Read reports about your outpatient visits, procedures, consult and ER notes X X X X   See your discharge summary, which is a recap of your hospital and/or ER visit that includes your diagnosis, lab results, and care plan. X X       How to register for CloudSync:  1. Go to  [email or web address].  2. Click on the Sign Up Now box, which takes you to the New Member Sign Up page. You will need to provide the following information:  a. Enter  your [employer] Access Code exactly as it appears at the top of this page. (You will not need to use this code after you’ve completed the sign-up process. If you do not sign up before the expiration date, you must request a new code.)   b. Enter your date of birth.   c. Enter your home email address.   d. Click Submit, and follow the next screen’s instructions.  3. Create a [employer] ID. This will be your [employer] login ID and cannot be changed, so think of one that is secure and easy to remember.  4. Create a [employer] password. You can change your password at any time.  5. Enter your Password Reset Question and Answer. This can be used at a later time if you forget your password.   6. Enter your e-mail address. This allows you to receive e-mail notifications when new information is available in [employer].  7. Click Sign Up. You can now view your health information.    For assistance activating your [employer] account, call [phone]

## 2017-03-07 NOTE — PROGRESS NOTES
"Subjective:     Abner Torres is a 56 y.o. male who presents for Hospital Follow-up.  Chart reviewed. Discharge summary available for review: Yes   Date of discharge 3/4/2017.  48- hour post discharge RN call completed on 3/6/2017 and documented in the medical record by Payton Hall.    Recent hospitalization hx:  CABG on 10/18/2016  11/10 ransferred to rehab; 11/30 transferred back to [facility] due to SOB  12/5/2016 s/p left Thoracoscopy with talc pleurodesis and bilateral chest tube insertion by Dr.Ganser for pleural effusion, went to ICU due to hypotension during this hospitalization (no h&p or dc summary for this, info obtained from 12/21 rehab H&P)  12/21-1/6 rehab  1/18-1/25 ER visit for dyspnea, not fill the medications, sob due to non-compliance. BNP>2300    2/27-3/4 This hospitalization HPI: Recently hospitalized for severe hyperglycemia without keoacidosis (A1C 16.5), also found to have URI, to complete 7 days augmentin through 3/8 (did not after discharge, discharged with lantus 15 units (he reported he only uses lantus and he take 17 units now) and humulin r sliding scale (not using). Had received DM education in the hospital.     3/2 chest ct: Worsening anasarca with enlarging loculated right larger than left pleural effusions, stable small pericardial effusion, and new diffuse fat stranding; pneumonia    Since returning home, patient reports feeling \"crap\". He reported that he cannot remember anything. He brought in the discharge paper with paper prescription clipped on it. He reported that he only has lantus at home and lisinopril but then later he said he only has lantus no lisinopril. He cannot find other medications and reported they are lost. Pt is poor historian, not compliant to his medications. The patient does feel weakness but no difficulty taking care of self at home. He denied fall. He lives alone and reported that he does not like people to go to his house when we discussed about home " "health referral. He is encouraged to accept this and he agrees that he will think about this.     Most recent echo 12/23: EF 35%. Grade III diastolic dysfunction (restrictive pattern).   Future appointment: 3/20 PCP, No cardiologist appointment    Allergies:   Review of patient's allergies indicates no known allergies.    Social History:  Social History   Substance Use Topics   • Smoking status: Never Smoker    • Smokeless tobacco: Never Used   • Alcohol Use: No        ROS:  Review of Systems   Constitutional: Positive for malaise/fatigue. Negative for fever and chills.   HENT: Positive for congestion ( chest congested). Negative for sore throat.    Respiratory: Negative for cough ( pt reported that he is only having coughing, he insisted that he does not feel any sob), sputum production, shortness of breath and wheezing ( insisted no wheezing).    Cardiovascular: Positive for chest pain (it has been there after his CABG, not chest pain, it is just tightness. ). Negative for palpitations and leg swelling ( No leg edema but he feels his face started to swell up).   Gastrointestinal: Negative for nausea, vomiting, abdominal pain, diarrhea and constipation.   Genitourinary: Negative for dysuria, urgency and frequency.   Musculoskeletal: Negative for falls.   Skin: Negative for rash.   Neurological: Positive for weakness. Negative for dizziness, sensory change, focal weakness and headaches.   Psychiatric/Behavioral: Negative for depression ( denied depression, just feeling crap, not well, tiredness, flat expression, forgetful) and suicidal ideas.        Objective:     Blood pressure 136/90, pulse 80, temperature 36.7 °C (98.1 °F), resp. rate 16, height 1.854 m (6' 1\"), weight 94.802 kg (209 lb), SpO2 90 %.     Physical Exam:  Physical Exam   Constitutional: He is oriented to person, place, and time and well-developed, well-nourished, and in no distress.   HENT:   Head: Normocephalic and atraumatic.   Eyes: Conjunctivae " are normal.   Neck: Neck supple. No JVD present.   Cardiovascular: Normal rate and regular rhythm.    No murmur heard.  Pulmonary/Chest: Effort normal. No respiratory distress. He has no wheezes. He has rales ( bilateral lower lobes).   Oxygen drops to 86% when ambulating. Pt is not compliant to his cardiac medications.    Abdominal: Soft. Bowel sounds are normal. He exhibits no distension. There is no tenderness. There is no rebound.   Musculoskeletal: Normal range of motion. He exhibits no edema ( no edema to his bilateral lower leg but face does appear a little bit puffy).   Neurological: He is alert and oriented to person, place, and time. No cranial nerve deficit. Gait normal.   But very poor historian   Skin: Skin is warm.   Psychiatric:   Affect flat   Vitals reviewed.        Assessment and Plan:     1. Hospital discharge follow-up  Hospitalization and results reviewed with patient. High risk conditions requiring teaching or care coordination were identified and addressed.The patient demonstrate understanding of admission and underlying conditions. The patient understands discharge instructions and when to seek medical attention. Medications reviewed including instructions regarding high risk medications, dosing and side effects.    The patient is able to safely adhere to ADL/IADL, treatment and medication regimen, self-manage of high-risk conditions? No   The patient requires physical therapy/home health/DME referral? Yes HH sent but pt might decline. Encouraged pt to think about this and has been spending lots of time on this.   The patient requires referral to care coordination/behavioral health/social work?  Yes care manager referral sent.  Patient requires referral for pharmacy consult? No   Advance directive/POLST on file?  No   Required counseled on advance directive?  No     - REFERRAL TO CARE MANAGEMENT SERVICES Reason for Consult:: non compliant, readmission for chest pain, poor controlled DM  -  "REFERRAL TO HOME HEALTH  - Asked pt to fill augmentin and finished the medications. Instructed pt must go to pharmacy right after this visit to  medications. He is hesitated but then nodding head, said \"ok\"  - Need to follow up with PCP and screen for depression  - EPS report sent for self negligence and non compliance    2. Diabetes mellitus type 2 in nonobese (CMS-HCC)  - REFERRAL TO CARE MANAGEMENT SERVICES Reason for Consult:: non compliant, readmission for chest pain, poor controlled DM  - reported that he is only using lantus and he is using 17 units instead of 15 units. His poc glucose is controlled well in clinic today at 170    3. Debility  - REFERRAL TO CARE MANAGEMENT SERVICES Reason for Consult:: non compliant, readmission for chest pain, poor controlled DM    4. 3-vessel coronary artery disease  - He need to follow up with cardiologist. MA to help pt set up the appointment, scheduled to see cardiologist on 3/13  - Has refilled all the medications for pt. Has been spending lots of time today to discuss the importance to be compliant to the medications    5. Systolic congestive heart failure, unspecified congestive heart failure chronicity (CMS-HCC)  - reported that he lost all the medications and not taking any of them (only has lantus for DM at home)  - Refill all the medications, no respiratory distress at this time but  Today and 3/1 185 lbs, no leg edema but his face appears puffy. He is instructed to go to ER if he develops sob (he insisted that he does not feel sob, just coughing, his saturation drops to 86% on exertion but he still reports no sob.   - Oxygen requested    6. Ischemic cardiomyopathy  - carvedilol (COREG) 12.5 MG Tab; Take 1 Tab by mouth 2 times a day, with meals.  Dispense: 60 Tab; Refill: 11  - lisinopril (PRINIVIL) 20 MG Tab; Take 1 Tab by mouth every day.  Dispense: 30 Tab; Refill: 11    7. Mixed hyperlipidemia  - atorvastatin (LIPITOR) 40 MG Tab; Take 1 Tab by mouth " "every evening.  Dispense: 30 Tab; Refill: 11    8. Chronic systolic congestive heart failure, NYHA class 4 (CMS-HCC)  - Follow up with cardiologist on 3/13/2017  - furosemide (LASIX) 40 MG Tab; Take 1 Tab by mouth every day.  Dispense: 120 Tab; Refill: 3  - spironolactone (ALDACTONE) 25 MG Tab; Take 1 Tab by mouth every day.  Dispense: 30 Tab; Refill: 11    9. Gastroesophageal reflux disease without esophagitis  - omeprazole (PRILOSEC) 20 MG delayed-release capsule; Take 1 Cap by mouth every day.  Dispense: 30 Cap; Refill: 11      Medication Reconciliation  Medication list at end of encounter:   Current Outpatient Prescriptions   Medication Sig Dispense Refill   • carvedilol (COREG) 12.5 MG Tab Take 1 Tab by mouth 2 times a day, with meals. 60 Tab 11   • atorvastatin (LIPITOR) 40 MG Tab Take 1 Tab by mouth every evening. 30 Tab 11   • aspirin 81 MG EC tablet Take 1 Tab by mouth every day. 30 Tab 3   • furosemide (LASIX) 40 MG Tab Take 1 Tab by mouth every day. 120 Tab 3   • vitamin D (CHOLECALCIFEROL) 1000 UNIT Tab Take 1 Tab by mouth every day. 60 Tab 3   • omeprazole (PRILOSEC) 20 MG delayed-release capsule Take 1 Cap by mouth every day. 30 Cap 11   • lisinopril (PRINIVIL) 20 MG Tab Take 1 Tab by mouth every day. 30 Tab 11   • spironolactone (ALDACTONE) 25 MG Tab Take 1 Tab by mouth every day. 30 Tab 11   • insulin glargine (LANTUS) 100 UNIT/ML Solution Inject 15 Units as instructed every evening. 10 mL 2   • amoxicillin-clavulanate (AUGMENTIN) 875-125 MG Tab Take 1 Tab by mouth every 12 hours for 7 doses. 7 Tab 0   • insulin regular (HUMULIN R) 100 Unit/mL Solution Inject 3 Units as instructed 3 times a day before meals. 10 mL 2   • Insulin Syringe-Needle U-100 (INSULIN SYRINGE .5CC/30GX1/2\") 30G X 1/2\" 0.5 ML Misc 1 Each by Does not apply route 4 Times a Day,Before Meals and at Bedtime. 120 Each 2   • glucose blood strip 1 Strip by Other route as needed (4 times a day). 120 Strip 2     No current " facility-administered medications for this visit.       Primary care follow-up:  New health conditions identified during hospitalization? Yes   Labs/pathology/imaging requires future PCP follow-up?  No   Changes to medications during hospitalization or today? Yes     Recommended followup: Return if symptoms worsen or fail to improve. with NNEKA Christine   Future Appointments       Provider Department [place]    3/7/2017 3:40 PM LEA Otero [facility]    3/20/2017 1:00 PM NNEKA Christine [place] [address]          Patient Instruction  Patient offered educational material on discharge diagnosis and management of symptoms/red flags. Patient instructed to keep follow-up appointments and to bring written questions and and actual medications to each office visit. Patient instructed to call PCP/specialist with any problems/questions/concerns. Patient verbalizes understanding and has no further questions at this time.    Face-to-face transitional care management services with high complexity medical decision making.

## 2017-03-07 NOTE — PROGRESS NOTES
"POST DISCHARGE CALL MADE BY Payton Hall  Discharge Date:3/4/2017   Date of Outreach Call: 3/6/2017  2:15 PM  Now that you're home, how are you doing? Poor  Comment:Pt states he is feeling \"lousy\" since hospital  discharge.  Pt states his face is bloated and he does not  feel well.  He states he has been sleeping since hospital  d/c on 3/4 and he is too tired to get his Rx filled at the  pharmacy.    Do you have questions about your medications? No    Did you fill your medications? No  Comment:Pt states he is taking his insulin, but isn't  taking any of his other meds and doesn't know where they  are.  Encouraged pt to go today and fill his Augmentin Rx.   It is unclear if pt is checking blood sugars.    Do you have a follow-up appointment scheduled?Yes  Comment:Pt states he will f/u at discharge clinic appt  scheduled for 3/7/17 at 3:40 PM.  Pt states he will take RTC  to his appt.  Instructed pt on location of [facility].   Stressed the importance of patient attending f/u appt, pt  verbalizes understanding.    Discharging Department: [place] [id number]    Number of Attempts: 2  Current or previous attempts completed within two business days of discharge? Yes  Provided education regarding treatment plan, medication, self-management, ADLs? Yes  Has patient completed Advance Directive? If yes, advise them to bring to appointment. No  Care Manager phone number provided? Yes  Comment:Kiera at [phone]  Is there anything else I can help you with? No    Face to Face Note  -  Durable Medical Equipment    Osiel Bronson, LORRIE.P.R.N. - NPI: [id number]  I certify that this patient is under my care and that they had a durable medical equipment(DME)face to face encounter by myself that meets the physician DME face-to-face encounter requirements with this patient on:    Date of encounter:   Patient:                    MRN:                       YOB: 2016  Abner Torres  [id number]  1960     The encounter " with the patient was in whole, or in part, for the following medical condition, which is the primary reason for durable medical equipment:  CHF    I certify that, based on my findings, the following durable medical equipment is medically necessary:  Oxygen.    HOME O2 Saturation Measurements:(Values must be present for Home Oxygen orders)         ,     ,         My Clinical findings support the need for the above equipment due to:  Hypoxia    Supporting Symptoms: debility, dyspnea on exertion

## 2017-03-07 NOTE — PATIENT INSTRUCTIONS
If you need further assistance, or have any questions; concerns or lingering symptoms before seeing your Primary Care Provider or specialist.     Do not hesitate to contact us.     Please contact us at the Post-Hospital Follow Up Program at [phone].   Our offices hours are Monday-Friday 8 am-5 pm.

## 2017-03-09 NOTE — PROGRESS NOTES
CC received referral from Director of Care Management to contact patient for assistance with telephone number for his insurance company for care coordination services.   CC outreach call to assist patient with contacting his insurance for care management services. This CC unable to follow patient as patient is non ACO insurance plan. CC left  for patient requesting return call to [phone].

## 2017-03-12 NOTE — IP AVS SNAPSHOT
Exabre Access Code: [email or web address]  Expires: 3/28/2017 11:30 AM    Your email address is not on file at [facility].  Email Addresses are required for you to sign up for Exabre, please contact [phone] to verify your personal information and to provide your email address prior to attempting to register for Exabre.    Abner Torres  [address]  [place] [address]    Exabre  A secure, online tool to manage your health information     [facility]’s Exabre® is a secure, online tool that connects you to your personalized health information from the privacy of your home -- day or night - making it very easy for you to manage your healthcare. Once the activation process is completed, you can even access your medical information using the Exabre brinda, which is available for free in the Apple Brinda store or Google Play store.     To learn more about Exabre, visit [email or web address]    There are two levels of access available (as shown below):   My Chart Features  [facility] Primary Care Doctor [facility]  Specialists [facility]  Urgent  Care Non-[facility] Primary Care Doctor   Email your healthcare team securely and privately 24/7 X X X    Manage appointments: schedule your next appointment; view details of past/upcoming appointments X      Request prescription refills. X      View recent personal medical records, including lab and immunizations X X X X   View health record, including health history, allergies, medications X X X X   Read reports about your outpatient visits, procedures, consult and ER notes X X X X   See your discharge summary, which is a recap of your hospital and/or ER visit that includes your diagnosis, lab results, and care plan X X  X     How to register for Exabre:  Once your e-mail address has been verified, follow the following steps to sign up for [facility].     1. Go to  [email or web address]  2. Click on the Sign Up Now box, which takes you to the New Member Sign Up page. You  will need to provide the following information:  a. Enter your [facility] Access Code exactly as it appears at the top of this page. (You will not need to use this code after you’ve completed the sign-up process. If you do not sign up before the expiration date, you must request a new code.)   b. Enter your date of birth.   c. Enter your home email address.   d. Click Submit, and follow the next screen’s instructions.  3. Create a [employer]Essential[employer] ID. This will be your [facility] login ID and cannot be changed, so think of one that is secure and easy to remember.  4. Create a [facility] password. You can change your password at any time.  5. Enter your Password Reset Question and Answer. This can be used at a later time if you forget your password.   6. Enter your e-mail address. This allows you to receive e-mail notifications when new information is available in [facility].  7. Click Sign Up. You can now view your health information.    For assistance activating your [facility] account, call [phone]

## 2017-03-12 NOTE — IP AVS SNAPSHOT
3/17/2017          Abner Torres  [address]  [place] [address]    Dear Abner:    [facility] wants to ensure your discharge home is safe and you or your loved ones have had all your questions answered regarding your care after you leave the hospital.    You may receive a telephone call within two days of your discharge.  This call is to make certain you understand your discharge instructions as well as ensure we provided you with the best care possible during your stay with us.     The call will only last approximately 3-5 minutes and will be done by a nurse.    Once again, we want to ensure your discharge home is safe and that you have a clear understanding of any next steps in your care.  If you have any questions or concerns, please do not hesitate to contact us, we are here for you.  Thank you for choosing [facility] for your healthcare needs.    Sincerely,    Edy Ayala    [facility]

## 2017-03-12 NOTE — IP AVS SNAPSHOT
Home Care Instructions                                                                                                                  Name:Abner Torres  Medical Record Number:[id number]  CSN: [id number]    YOB: 1960   Age: 56 y.o.  Sex: male  HT:1.829 m (6') WT: 80.8 kg (178 lb 2.1 oz)          Admit Date: 3/12/2017     Discharge Date:   Today's Date: 3/17/2017  Attending Doctor:  Ayde Gandhi M.D.                  Allergies:  Review of patient's allergies indicates no known allergies.            Discharge Instructions       Discharge Instructions    Discharged to home by car with friend. Discharged via wheelchair, hospital escort: Yes.  Special equipment needed: Not Applicable    Be sure to schedule a follow-up appointment with your primary care doctor or any specialists as instructed.     Discharge Plan:   Diet Plan: Discussed  Activity Level: Discussed  Confirmed Follow up Appointment: Appointment Scheduled  Confirmed Symptoms Management: Discussed  Medication Reconciliation Updated: Yes  Influenza Vaccine Indication: Not indicated: Previously immunized this influenza season and > 8 years of age    I understand that a diet low in cholesterol, fat, and sodium is recommended for good health. Unless I have been given specific instructions below for another diet, I accept this instruction as my diet prescription.   Other diet: Heart healthy diet    Special Instructions:   HF Patient Discharge Instructions  · Monitor your weight daily, and maintain a weight chart, to track your weight changes.   · Activity as tolerated, unless your Doctor has ordered otherwise. Other activity order: Heart healthy diet.  · Follow a low fat, low cholesterol, low salt diet unless instructed otherwise by your Doctor. Read the labels on the back of food products and track your intake of fat, cholesterol and salt.   · Fluid Restriction No. If a Fluid Restriction has been ordered by your Doctor, measure fluids with a measuring  cup to ensure that you are not exceeding the restriction.   · No smoking.  · Oxygen No. If your Doctor has ordered that you wear Oxygen at home, it is important to wear it as ordered.  · Did you receive an explanation from staff on the importance of taking each of your medications and why it is necessary to keep taking them unless your doctor says to stop? Yes  · Were all of your questions answered about how to manage your heart failure and what to do if you have increased signs and symptoms after you go home? Yes  · Do you feel like your heart failure care team involved you in the care treatment plan and allowed you to make decisions regarding your care while in the hospital and addressed any discharge needs you might have? Yes    See the educational handout provided at discharge for more information on monitoring your daily weight, activity and diet. This also explains more about Heart Failure, symptoms of a flare-up and some of the tests that you have undergone.     Warning Signs of a Flare-Up include:  · Swelling in the ankles or lower legs.  · Shortness of breath, while at rest, or while doing normal activities.   · Shortness of breath at night when in bed, or coughing in bed.   · Requiring more pillows to sleep at night, or needing to sit up at night to sleep.  · Feeling weak, dizzy or fatigued.     When to call your Doctor:  · [facility] questions regarding the discharge instructions you were given [phone].  · Call your Primary Care Physician or Cardiologist if:   ¨ You experience any pain radiating to your jaw or neck.  ¨ You have any difficulty breathing.  ¨ You experience weight gain of 3 lbs in a day or 5 lbs in a week.   ¨ You feel any palpitations or irregular heartbeats.  ¨ You become dizzy or lose consciousness.   If you have had an angiogram or had a pacemaker or AICD placed, and experience:  · Bleeding, drainage or swelling at the surgical / puncture site.  · Fever  greater than 100.0 F  · Shock from internal defibrillator.  · Cool and / or numb extremities.      · Is patient discharged on Warfarin / Coumadin?   No     · Is patient Post Blood Transfusion?  No    Depression / Suicide Risk    As you are discharged from this [facility] facility, it is important to learn how to keep safe from harming yourself.    Recognize the warning signs:  · Abrupt changes in personality, positive or negative- including increase in energy   · Giving away possessions  · Change in eating patterns- significant weight changes-  positive or negative  · Change in sleeping patterns- unable to sleep or sleeping all the time   · Unwillingness or inability to communicate  · Depression  · Unusual sadness, discouragement and loneliness  · Talk of wanting to die  · Neglect of personal appearance   · Rebelliousness- reckless behavior  · Withdrawal from people/activities they love  · Confusion- inability to concentrate     If you or a loved one observes any of these behaviors or has concerns about self-harm, here's what you can do:  · Talk about it- your feelings and reasons for harming yourself  · Remove any means that you might use to hurt yourself (examples: pills, rope, extension cords, firearm)  · Get professional help from the community (Mental Health, Substance Abuse, psychological counseling)  · Do not be alone:Call your Safe Contact- someone whom you trust who will be there for you.  · Call your local CRISIS HOTLINE 291-4140 or 074-096-1549  · Call your local Children's Mobile Crisis Response Team [place] (341) 906-9710 or www.[employer])  · Call the toll free National Suicide Prevention Hotlines   · National Suicide Prevention Lifeline 454-812-RQSR (2929)  · National Hope Line Network 800-SUICIDE (501-7770)    Heart Failure  Heart failure is a condition in which the heart has trouble pumping blood. This means your heart does not pump blood efficiently for your body to work well. In some  cases of heart failure, fluid may back up into your lungs or you may have swelling (edema) in your lower legs. Heart failure is usually a long-term (chronic) condition. It is important for you to take good care of yourself and follow your health care provider's treatment plan.  CAUSES   Some health conditions can cause heart failure. Those health conditions include:  · High blood pressure (hypertension). Hypertension causes the heart muscle to work harder than normal. When pressure in the blood vessels is high, the heart needs to pump (contract) with more force in order to circulate blood throughout the body. High blood pressure eventually causes the heart to become stiff and weak.  · Coronary artery disease (CAD). CAD is the buildup of cholesterol and fat (plaque) in the arteries of the heart. The blockage in the arteries deprives the heart muscle of oxygen and blood. This can cause chest pain and may lead to a heart attack. High blood pressure can also contribute to CAD.  · Heart attack (myocardial infarction). A heart attack occurs when one or more arteries in the heart become blocked. The loss of oxygen damages the muscle tissue of the heart. When this happens, part of the heart muscle dies. The injured tissue does not contract as well and weakens the heart's ability to pump blood.  · Abnormal heart valves. When the heart valves do not open and close properly, it can cause heart failure. This makes the heart muscle pump harder to keep the blood flowing.  · Heart muscle disease (cardiomyopathy or myocarditis). Heart muscle disease is damage to the heart muscle from a variety of causes. These can include drug or alcohol abuse, infections, or unknown reasons. These can increase the risk of heart failure.  · Lung disease. Lung disease makes the heart work harder because the lungs do not work properly. This can cause a strain on the heart, leading it to fail.  · Diabetes. Diabetes increases the risk of heart failure.  High blood sugar contributes to high fat (lipid) levels in the blood. Diabetes can also cause slow damage to tiny blood vessels that carry important nutrients to the heart muscle. When the heart does not get enough oxygen and food, it can cause the heart to become weak and stiff. This leads to a heart that does not contract efficiently.  · Other conditions can contribute to heart failure. These include abnormal heart rhythms, thyroid problems, and low blood counts (anemia).  Certain unhealthy behaviors can increase the risk of heart failure, including:  · Being overweight.  · Smoking or chewing tobacco.  · Eating foods high in fat and cholesterol.  · Abusing illicit drugs or alcohol.  · Lacking physical activity.  SYMPTOMS   Heart failure symptoms may vary and can be hard to detect. Symptoms may include:  · Shortness of breath with activity, such as climbing stairs.  · Persistent cough.  · Swelling of the feet, ankles, legs, or abdomen.  · Unexplained weight gain.  · Difficulty breathing when lying flat (orthopnea).  · Waking from sleep because of the need to sit up and get more air.  · Rapid heartbeat.  · Fatigue and loss of energy.  · Feeling light-headed, dizzy, or close to fainting.  · Loss of appetite.  · Nausea.  · Increased urination during the night (nocturia).  DIAGNOSIS   A diagnosis of heart failure is based on your history, symptoms, physical examination, and diagnostic tests. Diagnostic tests for heart failure may include:  · Echocardiography.  · Electrocardiography.  · Chest X-ray.  · Blood tests.  · Exercise stress test.  · Cardiac angiography.  · Radionuclide scans.  TREATMENT   Treatment is aimed at managing the symptoms of heart failure. Medicines, behavioral changes, or surgical intervention may be necessary to treat heart failure.  · Medicines to help treat heart failure may include:  · Angiotensin-converting enzyme (ACE) inhibitors. This type of medicine blocks the effects of a blood protein  called angiotensin-converting enzyme. ACE inhibitors relax (dilate) the blood vessels and help lower blood pressure.  · Angiotensin receptor blockers (ARBs). This type of medicine blocks the actions of a blood protein called angiotensin. Angiotensin receptor blockers dilate the blood vessels and help lower blood pressure.  · Water pills (diuretics). Diuretics cause the kidneys to remove salt and water from the blood. The extra fluid is removed through urination. This loss of extra fluid lowers the volume of blood the heart pumps.  · Beta blockers. These prevent the heart from beating too fast and improve heart muscle strength.  · Digitalis. This increases the force of the heartbeat.  · Healthy behavior changes include:  · Obtaining and maintaining a healthy weight.  · Stopping smoking or chewing tobacco.  · Eating heart-healthy foods.  · Limiting or avoiding alcohol.  · Stopping illicit drug use.  · Physical activity as directed by your health care provider.  · Surgical treatment for heart failure may include:  · A procedure to open blocked arteries, repair damaged heart valves, or remove damaged heart muscle tissue.  · A pacemaker to improve heart muscle function and control certain abnormal heart rhythms.  · An internal cardioverter defibrillator to treat certain serious abnormal heart rhythms.  · A left ventricular assist device (LVAD) to assist the pumping ability of the heart.  HOME CARE INSTRUCTIONS   · Take medicines only as directed by your health care provider. Medicines are important in reducing the workload of your heart, slowing the progression of heart failure, and improving your symptoms.  · Do not stop taking your medicine unless directed by your health care provider.  · Do not skip any dose of medicine.  · Refill your prescriptions before you run out of medicine. Your medicines are needed every day.  · Engage in moderate physical activity if directed by your health care provider. Moderate physical  activity can benefit some people. The elderly and people with severe heart failure should consult with a health care provider for physical activity recommendations.  · Eat heart-healthy foods. Food choices should be free of trans fat and low in saturated fat, cholesterol, and salt (sodium). Healthy choices include fresh or frozen fruits and vegetables, fish, lean meats, legumes, fat-free or low-fat dairy products, and whole grain or high fiber foods. Talk to a dietitian to learn more about heart-healthy foods.  · Limit sodium if directed by your health care provider. Sodium restriction may reduce symptoms of heart failure in some people. Talk to a dietitian to learn more about heart-healthy seasonings.  · Use healthy cooking methods. Healthy cooking methods include roasting, grilling, broiling, baking, poaching, steaming, or stir-frying. Talk to a dietitian to learn more about healthy cooking methods.  · Limit fluids if directed by your health care provider. Fluid restriction may reduce symptoms of heart failure in some people.  · Weigh yourself every day. Daily weights are important in the early recognition of excess fluid. You should weigh yourself every morning after you urinate and before you eat breakfast. Wear the same amount of clothing each time you weigh yourself. Record your daily weight. Provide your health care provider with your weight record.  · Monitor and record your blood pressure if directed by your health care provider.  · Check your pulse if directed by your health care provider.  · Lose weight if directed by your health care provider. Weight loss may reduce symptoms of heart failure in some people.  · Stop smoking or chewing tobacco. Nicotine makes your heart work harder by causing your blood vessels to constrict. Do not use nicotine gum or patches before talking to your health care provider.  · Keep all follow-up visits as directed by your health care provider. This is important.  · Limit  alcohol intake to no more than 1 drink per day for nonpregnant women and 2 drinks per day for men. One drink equals 12 ounces of beer, 5 ounces of wine, or 1½ ounces of hard liquor. Drinking more than that is harmful to your heart. Tell your health care provider if you drink alcohol several times a week. Talk with your health care provider about whether alcohol is safe for you. If your heart has already been damaged by alcohol or you have severe heart failure, drinking alcohol should be stopped completely.  · Stop illicit drug use.  · Stay up-to-date with immunizations. It is especially important to prevent respiratory infections through current pneumococcal and influenza immunizations.  · Manage other health conditions such as hypertension, diabetes, thyroid disease, or abnormal heart rhythms as directed by your health care provider.  · Learn to manage stress.  · Plan rest periods when fatigued.  · Learn strategies to manage high temperatures. If the weather is extremely hot:  · Avoid vigorous physical activity.  · Use air conditioning or fans or seek a cooler location.  · Avoid caffeine and alcohol.  · Wear loose-fitting, lightweight, and light-colored clothing.  · Learn strategies to manage cold temperatures. If the weather is extremely cold:  · Avoid vigorous physical activity.  · Layer clothes.  · Wear mittens or gloves, a hat, and a scarf when going outside.  · Avoid alcohol.  · Obtain ongoing education and support as needed.  · Participate in or seek rehabilitation as needed to maintain or improve independence and quality of life.  SEEK MEDICAL CARE IF:   · You have a rapid weight gain.  · You have increasing shortness of breath that is unusual for you.  · You are unable to participate in your usual physical activities.  · You tire easily.  · You cough more than normal, especially with physical activity.  · You have any or more swelling in areas such as your hands, feet, ankles, or abdomen.  · You are unable  to sleep because it is hard to breathe.  · You feel like your heart is beating fast (palpitations).  · You become dizzy or light-headed upon standing up.  SEEK IMMEDIATE MEDICAL CARE IF:   · You have difficulty breathing.  · There is a change in mental status such as decreased alertness or difficulty with concentration.  · You have a pain or discomfort in your chest.  · You have an episode of fainting (syncope).  MAKE SURE YOU:   · Understand these instructions.  · Will watch your condition.  · Will get help right away if you are not doing well or get worse.     This information is not intended to replace advice given to you by your health care provider. Make sure you discuss any questions you have with your health care provider.     Document Released: 12/18/2006 Document Revised: 05/03/2016 Document Reviewed: 01/17/2014  [employer] Interactive Patient Education ©2016 [employer]    Pleural Effusion  A pleural effusion is an abnormal buildup of fluid in the layers of tissue between your lungs and the inside of your chest (pleural space). These two layers of tissue that line both your lungs and the inside of your chest are called pleura. Usually, there is no air in the space between the pleura, only a thin layer of fluid. If left untreated, a large amount of fluid can build up and cause the lung to collapse. A pleural effusion is usually caused by another disease that requires treatment.  The two main types of pleural effusion are:  · Transudative pleural effusion. This happens when fluid leaks into the pleural space because of a low protein count in your blood or high blood pressure in your vessels. Heart failure often causes this.  · Exudative infusion. This occurs when fluid collects in the pleural space from blocked blood vessels or lymph vessels. Some lung diseases, injuries, and cancers can cause this type of effusion.  CAUSES  Pleural effusion can be caused by:  · Heart failure.  · A blood clot in the lung  (pulmonary embolism).  · Pneumonia.  · Cancer.  · Liver failure (cirrhosis).  · Kidney disease.  · Complications from surgery, such as from open heart surgery.  SIGNS AND SYMPTOMS  In some cases, pleural effusion may cause no symptoms. Symptoms can include:  · Shortness of breath, especially when lying down.  · Chest pain, often worse when taking a deep breath.  · Fever.  · Dry cough that is lasting (chronic).  · Hiccups.  · Rapid breathing.  An underlying condition that is causing the pleural effusion (such as heart failure, pneumonia, blood clots, tuberculosis, or cancer) may also cause additional symptoms.  DIAGNOSIS  Your health care provider may suspect pleural effusion based on your symptoms and medical history. Your health care provider will also do a physical exam and a chest X-ray. If the X-ray shows there is fluid in your chest, you may need to have this fluid removed using a needle (thoracentesis) so it can be tested.  You may also have:  · Imaging studies of the chest, such as:  ¨ Ultrasound.  ¨ CT scan.  · Blood tests for kidney and liver function.  TREATMENT  Treatment depends on the cause of the pleural effusion. Treatment may include:  · Taking antibiotic medicines to clear up an infection that is causing the pleural effusion.  · Placing a tube in the chest to drain the effusion (tube thoracostomy). This procedure is often used when there is an infection in the fluid.  · Surgery to remove the fibrous outer layer of tissue from the pleural space (decortication).  · Thoracentesis, which can improve cough and shortness of breath.  · A procedure to put medicine into the chest cavity to seal the pleural space to prevent fluid buildup (pleurodesis).  · Chemotherapy and radiation therapy. These may be required in the case of cancerous (malignant) pleural effusion.  HOME CARE INSTRUCTIONS  · Take medicines only as directed by your health care provider.  · Keep track of how long you can gently exercise before  you get short of breath. Try simply walking at first.  · Do not use any tobacco products, including cigarettes, chewing tobacco, or electronic cigarettes. If you need help quitting, ask your health care provider.  · Keep all follow-up visits as directed by your health care provider. This is important.  SEEK MEDICAL CARE IF:  · The amount of time that you are able to exercise decreases or does not improve with time.  · You have pain or signs of infection at the puncture site if you had thoracentesis. Watch for:  ¨ Drainage.  ¨ Redness.  ¨ Swelling.  · You have a fever.  SEEK IMMEDIATE MEDICAL CARE IF:  · You are short of breath.  · You develop chest pain.  · You develop a new cough.  MAKE SURE YOU:  · Understand these instructions.  · Will watch your condition.  · Will get help right away if you are not doing well or get worse.     This information is not intended to replace advice given to you by your health care provider. Make sure you discuss any questions you have with your health care provider.     Document Released: 12/18/2006 Document Revised: 01/08/2016 Document Reviewed: 05/13/2015  [employer] Interactive Patient Education ©2016 [employer]      Your appointments     Mar 20, 2017  1:00 PM   Established Patient with NNEKA Christine   [facility] [address]    [address]  [address]  [place] [address]   [phone]           You will be receiving a confirmation call a few days before your appointment from our automated call confirmation system.            Mar 21, 2017 10:30 AM   Heart Failure New with Mulu Whitaker M.D.   [facility] (--)    [address], [address]  [place] [id number] [phone]                 Discharge Medication Instructions:    Below are the medications your physician expects you to take upon discharge:    Review all your home medications and newly ordered medications with your doctor and/or pharmacist.  Follow medication instructions as directed by your doctor and/or pharmacist.    Please keep your medication list with you and share with your physician.               Medication List      START taking these medications        Instructions    levofloxacin 500 MG tablet   Commonly known as:  LEVAQUIN   Next Dose Due:  Tomorrow 3/18    Take 1 Tab by mouth every day.   Dose:  500 mg       predniSONE 10 MG Tabs   Last time this was given:  40 mg on 3/17/2017  9:00 AM   Commonly known as:  DELTASONE   Next Dose Due:  Tomorrow 3/18 30mg    Take 1 Tab by mouth every day for 9 days. Take 30 mg for 3 days then 20 mg for 3 days then 10 mg for 3 days.   Dose:  10 mg         CONTINUE taking these medications        Instructions    aspirin 81 MG EC tablet   Last time this was given:  81 mg on 3/17/2017  9:00 AM   Next Dose Due:  Tomorrow 3/18    Take 1 Tab by mouth every day.   Dose:  81 mg       atorvastatin 40 MG Tabs   Last time this was given:  40 mg on 3/16/2017 10:08 PM   Commonly known as:  LIPITOR   Next Dose Due:  Tonight 3/17    Take 1 Tab by mouth every evening.   Dose:  40 mg       carvedilol 12.5 MG Tabs   Last time this was given:  12.5 mg on 3/17/2017  9:00 AM   Commonly known as:  COREG   Next Dose Due:  Tonight 3/17    Take 1 Tab by mouth 2 times a day, with meals.   Dose:  12.5 mg       furosemide 40 MG Tabs   Commonly known as:  LASIX   Next Dose Due:  Tomorrow 3/18    Doctor's comments:  Take additional tablet as needed for weight gain of 2-3 lbs overnight or 5 lbs in a week.   Take 1 Tab by mouth every day.   Dose:  40 mg       insulin glargine 100 UNIT/ML Soln   Last time this was given:  15 Units on 3/16/2017 10:05 PM   Commonly known as:  LANTUS   Next Dose Due:  Tonight 3/17    Inject 15 Units as instructed every evening.   Dose:  15 Units       insulin regular 100 Unit/mL Soln   Commonly known as:  HUMULIN R   Next Dose Due:  Tonight before dinner 3/17    Inject 3 Units as instructed 3 times a day before  "meals.   Dose:  3 Units       INSULIN SYRINGE .5CC/30GX1/2\" 30G X 1/2\" 0.5 ML Misc   Next Dose Due:  Tonight before dinner 3/17    Doctor's comments:  E11.9   1 Each by Does not apply route 4 Times a Day,Before Meals and at Bedtime.   Dose:  1 Each       lisinopril 20 MG Tabs   Last time this was given:  20 mg on 3/17/2017  9:00 AM   Commonly known as:  PRINIVIL   Next Dose Due:  Tomorrow 3/18    Take 1 Tab by mouth every day.   Dose:  20 mg       omeprazole 20 MG delayed-release capsule   Commonly known as:  PRILOSEC   Next Dose Due:  Tomorrow 3/18    Take 1 Cap by mouth every day.   Dose:  20 mg       spironolactone 25 MG Tabs   Last time this was given:  25 mg on 3/17/2017  9:00 AM   Commonly known as:  ALDACTONE   Next Dose Due:  Tomorrow 3/18    Take 1 Tab by mouth every day.   Dose:  25 mg       vitamin D 1000 UNIT Tabs   Last time this was given:  1,000 Units on 3/17/2017  9:00 AM   Commonly known as:  cholecalciferol   Next Dose Due:  Tomorrow 3/18    Take 1 Tab by mouth every day.   Dose:  1000 Units         STOP taking these medications     glucose blood strip               Instructions           Diet / Nutrition:    Follow any diet instructions given to you by your doctor or the dietician, including how much salt (sodium) you are allowed each day.    If you are overweight, talk to your doctor about a weight reduction plan.    Activity:    Remain physically active following your doctor's instructions about exercise and activity.    Rest often.     Any time you become even a little tired or short of breath, SIT DOWN and rest.    Worsening Symptoms:    Report any of the following signs and symptoms to the doctor's office immediately:    *Pain of jaw, arm, or neck  *Chest pain not relieved by medication                               *Dizziness or loss of consciousness  *Difficulty breathing even when at rest   *More tired than usual                                       *Bleeding drainage or swelling of " surgical site  *Swelling of feet, ankles, legs or stomach                 *Fever (>100ºF)  *Pink or blood tinged sputum  *Weight gain (3lbs/day or 5lbs /week)           *Shock from internal defibrillator (if applicable)  *Palpitations or irregular heartbeats                *Cool and/or numb extremities    Stroke Awareness    Common Risk Factors for Stroke include:    Age  Atrial Fibrillation  Carotid Artery Stenosis  Diabetes Mellitus  Excessive alcohol consumption  High blood pressure  Overweight   Physical inactivity  Smoking    Warning signs and symptoms of a stroke include:    *Sudden numbness or weakness of the face, arm or leg (especially on one side of the body).  *Sudden confusion, trouble speaking or understanding.  *Sudden trouble seeing in one or both eyes.  *Sudden trouble walking, dizziness, loss of balance or coordination.Sudden severe headache with no known cause.    It is very important to get treatment quickly when a stroke occurs. If you experience any of the above warning signs, call 911 immediately.                   Disclaimer         Quit Smoking / Tobacco Use:    I understand the use of any tobacco products increases my chance of suffering from future heart disease or stroke and could cause other illnesses which may shorten my life. Quitting the use of tobacco products is the single most important thing I can do to improve my health. For further information on smoking / tobacco cessation call a Toll Free Quit Line at 1-722.150.6971 (*National [employer] [place]) or 1-707.577.5771 (American [employer] Association) or you can access the web based program at www.lungusa.org.    [place] Tobacco Users Help Line:  (491) 473-7110       Toll Free: 1-811.245.4743  Quit Tobacco Program [facility] (513)695-5430    Crisis Hotline:    [place] Crisis Hotline:  3-664-BAPTXYO or 1-439.598.4546    [place] Crisis Hotline:    1-616.602.5331 or 120-729-3273    Discharge Survey:   Thank you for  choosing [facility]. We hope we did everything we could to make your hospital stay a pleasant one. You may be receiving a phone survey and we would appreciate your time and participation in answering the questions. Your input is very valuable to us in our efforts to improve our service to our patients and their families.        My signature on this form indicates that:    1. I have reviewed and understand the above information.  2. My questions regarding this information have been answered to my satisfaction.  3. I have formulated a plan with my discharge nurse to obtain my prescribed medications for home.                  Disclaimer         __________________________________                     __________       ________                       Patient Signature                                                 Date                    Time

## 2017-03-12 NOTE — IP AVS SNAPSHOT
" <p align=\"LEFT\"><IMG SRC=\"//[email or web address]$/[email or web address]\" alt=\"Image\" WIDTH=\"50%\" HEIGHT=\"200\" BORDER=\"\"></p>                   Name:Abner Torres  Medical Record Number:[id number]  CSN: [id number]    YOB: 1960   Age: 56 y.o.  Sex: male  HT:1.829 m (6') WT: 80.8 kg (178 lb 2.1 oz)          Admit Date: 3/12/2017     Discharge Date:   Today's Date: 3/17/2017  Attending Doctor:  Ayde Gandhi M.D.                  Allergies:  Review of patient's allergies indicates no known allergies.          Your appointments     Mar 20, 2017  1:00 PM   Established Patient with NNEKA Christine   [facility] [address]    [address]  [address]  [facility] [id number] [phone]           You will be receiving a confirmation call a few days before your appointment from our automated call confirmation system.            Mar 21, 2017 10:30 AM   Heart Failure New with Mulu Whitaker M.D.   [facility] (--)    [address], [address]  [place] [id number] [phone]                 Medication List      Take these Medications        Instructions    aspirin 81 MG EC tablet    Take 1 Tab by mouth every day.   Dose:  81 mg       atorvastatin 40 MG Tabs   Commonly known as:  LIPITOR    Take 1 Tab by mouth every evening.   Dose:  40 mg       carvedilol 12.5 MG Tabs   Commonly known as:  COREG    Take 1 Tab by mouth 2 times a day, with meals.   Dose:  12.5 mg       furosemide 40 MG Tabs   Commonly known as:  LASIX    Doctor's comments:  Take additional tablet as needed for weight gain of 2-3 lbs overnight or 5 lbs in a week.   Take 1 Tab by mouth every day.   Dose:  40 mg       insulin glargine 100 UNIT/ML Soln   Commonly known as:  LANTUS    Inject 15 Units as instructed every evening.   Dose:  15 Units       insulin regular 100 Unit/mL Soln   Commonly known as:  HUMULIN R    Inject 3 Units as instructed 3 times a day before meals.   Dose:  3 Units      " "INSULIN SYRINGE .5CC/30GX1/2\" 30G X 1/2\" 0.5 ML Misc    Doctor's comments:  E11.9   1 Each by Does not apply route 4 Times a Day,Before Meals and at Bedtime.   Dose:  1 Each       levofloxacin 500 MG tablet   Commonly known as:  LEVAQUIN    Take 1 Tab by mouth every day.   Dose:  500 mg       lisinopril 20 MG Tabs   Commonly known as:  PRINIVIL    Take 1 Tab by mouth every day.   Dose:  20 mg       omeprazole 20 MG delayed-release capsule   Commonly known as:  PRILOSEC    Take 1 Cap by mouth every day.   Dose:  20 mg       predniSONE 10 MG Tabs   Commonly known as:  DELTASONE    Take 1 Tab by mouth every day for 9 days. Take 30 mg for 3 days then 20 mg for 3 days then 10 mg for 3 days.   Dose:  10 mg       spironolactone 25 MG Tabs   Commonly known as:  ALDACTONE    Take 1 Tab by mouth every day.   Dose:  25 mg       vitamin D 1000 UNIT Tabs   Commonly known as:  cholecalciferol    Take 1 Tab by mouth every day.   Dose:  1000 Units         "

## 2017-03-13 NOTE — PROGRESS NOTES
Hospital Medicine Progress Note, Adult, Complex               Author: Trena Flowers Date & Time created: 3/13/2017  8:39 AM     Interval History:  CC:  SOB  3/12:  Admitted 56yoM withCHF EF 35%, CAD dc'd 1 week ago for hypoglycemia presented with SOB found to have actue on chronic CHF exacerbation.  3/13:  +wheezing on exam b/l lung fields, no neb treatments ordered.  Has had 2 prior thoracentesis since CABG 10/2016.   Denies calf pain/edema.    Review of Systems:  Review of Systems   Constitutional: Positive for malaise/fatigue. Negative for fever, chills and diaphoresis.   HENT: Negative for congestion and sore throat.    Eyes: Negative for pain and discharge.   Respiratory: Positive for cough, shortness of breath and wheezing. Negative for hemoptysis and sputum production.    Cardiovascular: Negative for chest pain, palpitations, claudication and leg swelling.   Gastrointestinal: Negative for nausea, vomiting, abdominal pain, diarrhea, constipation and melena.   Genitourinary: Negative for dysuria, urgency and frequency.   Musculoskeletal: Negative for myalgias, back pain, joint pain and neck pain.   Skin: Negative for itching and rash.   Neurological: Negative for dizziness, sensory change, speech change, focal weakness, loss of consciousness, weakness and headaches.   Endo/Heme/Allergies: Does not bruise/bleed easily.   Psychiatric/Behavioral: Negative for depression, suicidal ideas and substance abuse.       Physical Exam:  Physical Exam   Constitutional: He is oriented to person, place, and time. He appears well-developed and well-nourished. No distress.   HENT:   Head: Normocephalic and atraumatic.   Mouth/Throat: Oropharynx is clear and moist. No oropharyngeal exudate.   Eyes: Conjunctivae and EOM are normal. Pupils are equal, round, and reactive to light. Right eye exhibits no discharge. Left eye exhibits no discharge. No scleral icterus.   Neck: Normal range of motion. Neck supple. No JVD present. No  tracheal deviation present. No thyromegaly present.   Cardiovascular: Normal rate, regular rhythm and normal heart sounds.  Exam reveals no gallop and no friction rub.    No murmur heard.  Pulmonary/Chest: He is in respiratory distress. He has wheezes. He has no rales. He exhibits no tenderness.   Abdominal: Soft. Bowel sounds are normal. He exhibits no distension and no mass. There is no tenderness. There is no rebound and no guarding.   Musculoskeletal: Normal range of motion. He exhibits no edema or tenderness.   No calf pain or edema on exam.   Lymphadenopathy:     He has no cervical adenopathy.   Neurological: He is alert and oriented to person, place, and time. No cranial nerve deficit. He exhibits normal muscle tone.   Skin: Skin is warm and dry. No rash noted. He is not diaphoretic. No erythema.   Psychiatric: He has a normal mood and affect. His behavior is normal. Judgment and thought content normal.   Nursing note and vitals reviewed.      Labs:        Invalid input(s): [id number]  Recent Labs      17   TROPONINI  0.02   BNPBTYPENAT  202*     Recent Labs      17   SODIUM  135   POTASSIUM  4.1   CHLORIDE  103   CO2  23   BUN  25*   CREATININE  0.82   CALCIUM  8.7     Recent Labs      17   ALTSGPT  27   ASTSGOT  19   ALKPHOSPHAT  172*   TBILIRUBIN  0.8   LIPASE  6*   GLUCOSE  382*     Recent Labs      17   RBC  5.01   HEMOGLOBIN  13.3*   HEMATOCRIT  42.6   PLATELETCT  227   PROTHROMBTM  13.6   APTT  33.3   INR  1.01     Recent Labs      17   WBC  8.6   NEUTSPOLYS  67.00   LYMPHOCYTES  21.70*   MONOCYTES  4.30   EOSINOPHILS  4.40   BASOPHILS  0.00   ASTSGOT  19   ALTSGPT  27   ALKPHOSPHAT  172*   TBILIRUBIN  0.8           Hemodynamics:  Temp (24hrs), Av.4 °C (97.6 °F), Min:36.2 °C (97.2 °F), Max:36.7 °C (98.1 °F)  Temperature: 36.7 °C (98.1 °F)  Pulse  Av.4  Min: 70  Max: 84Heart Rate (Monitored): 77  Blood Pressure: 132/86 mmHg,  NIBP: 142/92 mmHg     Respiratory:    Respiration: 18, Pulse Oximetry: 91 %     Work Of Breathing / Effort: Mild;Speech Limiting/Unable to complete full sentence  RUL Breath Sounds: Expiratory Wheezes, RML Breath Sounds: Diminished, RLL Breath Sounds: Diminished, MIKE Breath Sounds: Expiratory Wheezes, LLL Breath Sounds: Diminished  Fluids:    Intake/Output Summary (Last 24 hours) at 03/13/17 0839  Last data filed at 03/13/17 0520   Gross per 24 hour   Intake      0 ml   Output   1150 ml   Net  -1150 ml     Weight: 91.3 kg (201 lb 4.5 oz)  GI/Nutrition:  Orders Placed This Encounter   Procedures   • Diet Order     Standing Status: Standing      Number of Occurrences: 1      Standing Expiration Date:      Order Specific Question:  Diet:     Answer:  Diabetic [3]     Medical Decision Making, by Problem:  Active Hospital Problems    Diagnosis   • *CHF (congestive heart failure) ([facility]) [I50.9]  EF 30%  2/2 ischemic cardiomyopathy  Repeat echo  Lasix IV force diuresis  BNP 2000   ACEI, BB  spironolactone     Acute on chronic respiratory distress:  Active wheeze on exam  Started RT protocol  Albuterol neb treatments q 4 hours  Solumedrol 40 IV q 6  Review of CXR with large layering of infiltrate RML  Started Rocephin IV  Doxy IV  Sputum culture    Pleural effusions:  Us chest ordered to see if amenable to thoracentesis  2 prior thora, pathology neg in 11/24/16 and 12/3/16   2/2 CABG 10/2016  Cultures negative on prior thora  H/o pleurodesis??    CAD  S/p CABG  No CP  Lipitor,   Coreg  ASA    DM2:  Glucose 300s  SSI  accuchecks  Lantus qhs  Diabetic diet    FC, ambulatory.  No home O2.  At 3.5 LPM NC    Yeager catheter: No Yeager      DVT Prophylaxis: Heparin      Antibiotics: Treating active infection/contamination beyond 24 hours perioperative coverage  Assessed for rehab: Patient returned to prior level of function, rehabilitation not indicated at this time

## 2017-03-13 NOTE — THERAPY
"Physical Therapy Evaluation completed.   Bed Mobility:  Supine to Sit: Stand by Assist  Transfers: Sit to Stand: Stand by Assist  Gait: Level Of Assist: Contact Guard Assist (close CGA ) without AD; recs for use of FWW at this time during recovery given current balance deficits; see below     Plan of Care: Will benefit from Physical Therapy 2 times per week  Discharge Recommendations: Equipment: Will Continue to Assess for Equipment Needs. See below    Pt presents to PT with impaired endurance and aerobic capacity associated with deconditioning and cardiovascular pump dysfunction (CHF). He also presents for risk reduction for LOB and falling as has impaired balance during ambulation and required CGA throughout with no AD. Would recommend use of FWW at this time during recovery given current balance deficits. He also appears to have limited insight into medical co-morbidities and affect on safety and function and appears generally unaware of etiology of current medical co-morbidities and adverse affect on endurance and ability to self monitor. Pt will benefit from continued skilled acute PT while here. Unclear if pt would require continued skilled PT prior to or after medical d/c to home and will be dependent on functional progression while here. He may benefit from additional social/community services upon eventual d/c to home given readmissions and unclear ability of pt to effectively care for self given current understanding of condition and affect on function. Will continue to visit.     See \"Rehab Therapy-Acute\" Patient Summary Report for complete documentation.     "

## 2017-03-13 NOTE — PROGRESS NOTES
2 RN skin check complete with Rupert LAROSE. Pt has redness non-blanching on left ear, redness on right ear. Soft silicone tubing and ear foam pads placed. Pt has open red peeling area on sacrum, photo taken, mepilex applied, see wound in flow sheet. Pt has generalized bruising and scabbing. No other s/sx of skin breakdown at this time.

## 2017-03-13 NOTE — ED NOTES
".  Chief Complaint   Patient presents with   • Chest Pain     pt states pain to lt rib cage has had bypass quadrupel within the last few months pt cannot give exact date, pt states \"I feel like crap\" pt is pale   • Shortness of Breath     pt with noted shortness of breath with any exertion    • Facial Swelling     has mild facial swelling to orbits noted   • Cough     nonproductive     Pt moved to triage 1 for ekg  "

## 2017-03-13 NOTE — ED NOTES
Pt brought back to rm GR 27 from triage by WC. Pt SpO2 79 % on RA, placed on 2 NC now 93%. Pt is pale and c/o of L side rib pain and SOB. Facial swelling noted. Pt able to transfer self to bed, pt in gown, on monitor, call light in reach. PIV line established. Blood drawn and sent to the lab. FS completed. Chart up for ERP.

## 2017-03-13 NOTE — THERAPY
"Occupational Therapy Evaluation completed.   Functional Status:  CGA with ADLs and txfs  Plan of Care: Will benefit from Occupational Therapy 3 times per week  Discharge Recommendations:  Equipment: Will Continue to Assess for Equipment Needs. Post-acute therapy recommended before discharged home.    See \"Rehab Therapy-Acute\" Patient Summary Report for complete documentation.    "

## 2017-03-13 NOTE — PROGRESS NOTES
Received report from Mary Kate LAROSE and assumed care of pt. Pt is A&Ox4. No signs or symptoms of pain or distress. Assessment completed and plan of care discussed. Pt verbalized understanding of call light and communication board. Patient questions answered at this time. Fall precautions in place. Patient needs met at this time. Hourly rounding in place.

## 2017-03-13 NOTE — H&P
CHIEF COMPLAINT:  Shortness of breath.    HISTORY OF PRESENT ILLNESS:  This is a 56-year-old male with history of   chronic congestive heart failure with ejection fraction of 35%, coronary   artery disease, who was just discharged about a week ago for hyperglycemia.    Apparently, he has a history of medical noncompliance according to him since   he got discharged.  He has been having progressive onset of shortness of   breath, bilateral lower extremity edema, and puffiness of his orbit area every   time he wakes up in the morning.  He said that he has been taking his Lasix   at home, but in spite of that, his symptoms got worse, especially over the   past 48 hours.  I wonder if he started eating more salt in his diet.  He   vehemently denied that he watches diet; however, when I told him that maybe he   probably is eating more canned goods this week, he did not answer that.  At   any rate, the patient is being admitted for bilateral pleural effusions and   congestive heart failure exacerbation.    PAST MEDICAL HISTORY:  Chronic congestive heart failure with ejection fraction   of 35%, diabetes type 2, coronary artery disease, hyperlipidemia, medical   noncompliance, and recurrent pleural effusion.    PAST SURGICAL HISTORY:  He had chest tube placed on the right and underwent   thoracostomy of pleurodesis.  Also had IVC filter placed for CABG and I and D.    SOCIAL HISTORY:  Denies alcohol and smoking.  Denies illicit drug use.    FAMILY HISTORY:  No cancer in the family, but diabetes mellitus in the family.    ALLERGIES:  NKDA.    HOME MEDICATIONS:  Aspirin 81 mg daily, Lipitor 40 mg daily, carvedilol 12.5   mg b.i.d., Lasix 40 mg daily, Lantus 15 units at bedtime, sliding scale   insulin, lisinopril 20 mg daily, Prilosec 20 mg daily, spironolactone 25 mg   daily, and vitamin D 1000 units daily.    REVIEW OF SYSTEMS:  The patient has been coughing with whitish phlegm.  Denies   any fever.  No nausea.  No vomiting.   No diarrhea.  All other systems   reviewed were negative.    PHYSICAL EXAMINATION:  VITAL SIGNS:  Blood pressure is 151/94, pulse of 80, respiratory rate 20,   temperature of 36.4, oxygen is 92% on 2 L of oxygen, when he came in was 89%   on room air.  GENERAL:  The patient was disheveled, lying in bed in mild distress.  HEENT:  Head normocephalic, atraumatic.  Eyes, pupils are reactive to light,   anicteric sclerae, periorbital edema noted.  Oral mucosa, no oral lesions   noted.  Moist mucosa.  NECK:  No JVD.  No lymphadenopathy.  No thyromegaly.  CHEST AND LUNGS:  Equal expansion.  Positive wheezing bilaterally.  Faint   rales noted in the lower lung fields.  No tenderness to palpation.  CARDIOVASCULAR SYSTEM:  Regular rate and rhythm.  S1 and S2 heard.  No murmurs   noted.  GASTROINTESTINAL:  Positive bowel sounds.  No tenderness.  No   hepatosplenomegaly.  EXTREMITIES:  Pulses palpable in both upper and lower extremities, maybe 1-2+   pitting edema in both lower extremities.  He is currently wearing compression   stockings.  NEUROLOGIC:  Cranial nerves II-XII intact.  Alert and oriented x3.    LABORATORY DATA:  WBC is 8.6, hemoglobin 13.3, hematocrit 42.6, and platelet   count of 227.  Sodium 135, potassium 4.1, chloride 103, CO2 of 23, anion gap   of 9, glucose of 382, BUN 25, creatinine of 0.82.  BNP of 2027.  Troponin I of   0.02.  INR is 1.01 TCH is 3.650.    IMAGING STUDIES:  Chest x-ray shows mild interstitial edema and worsening   bilateral pleural effusion, left more than right.  EKG, sinus rhythm.    ASSESSMENT AND PLAN:  1.  Acute on chronic congestive heart failure exacerbation.  I will   discontinue p.o. Lasix.  We will switch to 40 mg IV q. 12 hours.  I will   continue spironolactone.  May need to adjust his Lasix dose from 40 mg p.o.   daily to b.i.d.  Continue Coreg 12.5 mg b.i.d. and lisinopril 20 mg daily.  2.  Bilateral pleural effusion.  I am hoping that this will improve with Lasix    Otherwise, he might need thoracentesis.  We will continue to monitor in the   next 24-48 hours. Had pleurodysis last year.   3.  Acute hypoxic respiratory failure secondary to bilateral pleural effusion   and congestive heart failure exacerbation.  Continue oxygen and titrate per   protocol.  4.  Diabetes type 2.  Continue Lantus and I will put him on high sliding scale   insulin.  Accu-Cheks q. a.c. and at bedtime.  5.  Coronary artery disease, currently stable.  6.  Deep venous thrombosis prophylaxis, I will put him on heparin 5000 units   subQ q. 8 hours.  7.  Hyperlipidemia.  Continue Lipitor 40 mg daily.    MEDICAL DECISION MAKING:  More than 2 midnights.       ____________________________________     MD SILVANO Magdaleno / SHANTELLE    DD:  03/13/2017 02:15:58  DT:  03/13/2017 04:01:36    D#:  [id number]  Job#:  [id number]

## 2017-03-13 NOTE — PROGRESS NOTES
Pt arrived to unit from ER. Tele box placed on pt, monitor room notified. Pt resting comfortably in bed. All needs met at this time.

## 2017-03-14 NOTE — PROGRESS NOTES
Hospital Medicine Progress Note, Adult, Complex               Author: Ayde Gandhi  Date & Time created: 3/14/2017  2:21 PM     ID/CC: 57 y/o F admitted for SOB, found to have acute on chronic heart failure, and pleural effusion    Interval History:  No overnight events, afebrile, no nausea or vomiting.     Review of Systems:  Review of Systems   Constitutional: Positive for malaise/fatigue. Negative for fever, chills and diaphoresis.   HENT: Negative for congestion and sore throat.    Eyes: Negative for pain and discharge.   Respiratory: Positive for cough, shortness of breath and wheezing. Negative for hemoptysis and sputum production.    Cardiovascular: Negative for chest pain, palpitations, claudication and leg swelling.   Gastrointestinal: Negative for nausea, vomiting, abdominal pain, diarrhea, constipation and melena.   Genitourinary: Negative for dysuria, urgency and frequency.   Musculoskeletal: Negative for myalgias, back pain, joint pain and neck pain.   Skin: Negative for itching and rash.   Neurological: Negative for dizziness, sensory change, speech change, focal weakness, loss of consciousness, weakness and headaches.   Endo/Heme/Allergies: Does not bruise/bleed easily.   Psychiatric/Behavioral: Negative for depression, suicidal ideas and substance abuse.       Physical Exam:  Physical Exam   Constitutional: He is oriented to person, place, and time. He appears well-developed and well-nourished. No distress.   HENT:   Head: Normocephalic and atraumatic.   Mouth/Throat: Oropharynx is clear and moist. No oropharyngeal exudate.   Eyes: Conjunctivae and EOM are normal. Pupils are equal, round, and reactive to light. Right eye exhibits no discharge. Left eye exhibits no discharge. No scleral icterus.   Neck: Normal range of motion. Neck supple. No JVD present. No tracheal deviation present. No thyromegaly present.   Cardiovascular: Normal rate, regular rhythm and normal heart sounds.  Exam reveals no gallop  and no friction rub.    No murmur heard.  Pulmonary/Chest: He is in respiratory distress. He has wheezes. He has no rales. He exhibits no tenderness.   Abdominal: Soft. Bowel sounds are normal. He exhibits no distension and no mass. There is no tenderness. There is no rebound and no guarding.   Musculoskeletal: Normal range of motion. He exhibits no edema or tenderness.   No calf pain or edema on exam.   Lymphadenopathy:     He has no cervical adenopathy.   Neurological: He is alert and oriented to person, place, and time. No cranial nerve deficit. He exhibits normal muscle tone.   Skin: Skin is warm and dry. No rash noted. He is not diaphoretic. No erythema.   Psychiatric: He has a normal mood and affect. His behavior is normal. Judgment and thought content normal.   Nursing note and vitals reviewed.      Labs:        Invalid input(s): [id number]  Recent Labs      17   0019   TROPONINI  0.02   --    BNPBTYPENAT  2027*  3035*     Recent Labs      17   0019   SODIUM  135  140   POTASSIUM  4.1  4.0   CHLORIDE  103  104   CO2  23  27   BUN  25*  26*   CREATININE  0.82  0.75   CALCIUM  8.7  8.5     Recent Labs      17   0019   ALTSGPT  27   --    ASTSGOT  19   --    ALKPHOSPHAT  172*   --    TBILIRUBIN  0.8   --    LIPASE  6*   --    GLUCOSE  382*  291*     Recent Labs      17   0019   RBC  5.01  4.46*   HEMOGLOBIN  13.3*  11.8*   HEMATOCRIT  42.6  37.2*   PLATELETCT  227  186   PROTHROMBTM  13.6   --    APTT  33.3   --    INR  1.01   --      Recent Labs      17   0019   WBC  8.6  9.9   NEUTSPOLYS  67.00  92.10*   LYMPHOCYTES  21.70*  7.00*   MONOCYTES  4.30  0.00   EOSINOPHILS  4.40  0.00   BASOPHILS  0.00  0.00   ASTSGOT  19   --    ALTSGPT  27   --    ALKPHOSPHAT  172*   --    TBILIRUBIN  0.8   --            Hemodynamics:  Temp (24hrs), Av.4 °C (97.6 °F), Min:36.1 °C (97 °F), Max:36.7 °C (98.1  °F)  Temperature: 36.7 °C (98 °F)  Pulse  Av.8  Min: 62  Max: 84   Blood Pressure: 152/90 mmHg     Respiratory:    Respiration: 20, Pulse Oximetry: 96 %, O2 Daily Delivery Respiratory : Silicone Nasal Cannula     Given By:: Mouthpiece, Work Of Breathing / Effort: Mild  RUL Breath Sounds: Diminished, RML Breath Sounds: Diminished, RLL Breath Sounds: Diminished, MIKE Breath Sounds: Diminished, LLL Breath Sounds: Diminished  Fluids:    Intake/Output Summary (Last 24 hours) at 17 1421  Last data filed at 17 0630   Gross per 24 hour   Intake      0 ml   Output   2150 ml   Net  -2150 ml     Weight: 88.4 kg (194 lb 14.2 oz)  GI/Nutrition:  Orders Placed This Encounter   Procedures   • DIET ORDER     Standing Status: Standing      Number of Occurrences: 1      Standing Expiration Date:      Order Specific Question:  Diet:     Answer:  Diabetic [3]     Medical Decision Making, by Problem:  Active Hospital Problems    Diagnosis   • *CHF (congestive heart failure) (CMS-HCC) [I50.9]  EF 30%  2/2 ischemic cardiomyopathy  -cont IV lasix, ace , BB and spironolactone.     Acute on chronic respiratory distress:   RT protocol Albuterol neb treatments q 4 hours  Solumedrol 40 IV q 6  CXR with large layering of infiltrate RML  On Rocephin IV and Doxy IV  Sputum culture pending     Pleural effusions:  - s/p thoracentesis with 2 L removal.   2/2 CABG 10/2016  Cultures negative on prior thora  H/o pleurodesis??    CAD  S/p CABG  -cont lipitor,  coreg and ASA    DM2:  -cont lantus, SSI and accuchecks      Yeager catheter: No Yeager      DVT Prophylaxis: Heparin      Antibiotics: Treating active infection/contamination beyond 24 hours perioperative coverage  Assessed for rehab: Patient returned to prior level of function, rehabilitation not indicated at this time

## 2017-03-14 NOTE — CARE PLAN
Problem: Communication  Goal: The ability to communicate needs accurately and effectively will improve  Outcome: PROGRESSING AS EXPECTED  POC discussed with pt. All medications explained. All questions answered.     Problem: Respiratory:  Goal: Respiratory status will improve  Outcome: PROGRESSING AS EXPECTED  RT treatments in place.

## 2017-03-14 NOTE — RESPIRATORY CARE
COPD EDUCATION by COPD CLINICAL EDUCATOR  3/14/2017 at 6:22 AM by Enriqueta Lorenzana     Patient reviewed by COPD education team. Patient does not qualify for COPD program.

## 2017-03-14 NOTE — PROGRESS NOTES
Recurrent rt pleural effusion, Diagnostic and Therapeutic RIGHT Thoracentesis requested    RADIOLOGIST:   SONOGRAPHER: SHAHRZAD     US FINDINGS:  LARGE RT PLEURAL EFFUSION IDENTIFIED. SAFE SITE MARKED USING ULTRASOUND GUIDANCE. - 2000 CC CLEAR YELLOW FLUID FROM RT CHEST, 1000 CC TO LABORATORY FOR ANALYSIS. PT TOLERATED PROCEDURE WELL, WITH MINIMAL DISCOMFORT. THERE IS NOW A SMALL BAND-AID OVER THE PROCEDURE SITE (RT POSTERIOR, LATERAL CHEST). VITALS MONITORED AND RECORDED WITHIN EPIC DURING ENTIRE PROCEDURE. PT RETURNING TO FLOOR VIA TRANSPORT IN STABLE CONDITION

## 2017-03-14 NOTE — DISCHARGE PLANNING
"Care Transition Team Assessment    [facility] met with pt at bedside and pt was A&O x4. Pt indicated admission in to hospital after he was discharged and continued feeling unwell. Pt reported excessive swelling on his face and difficulty breathing, which was his reason for return. Pt underwent a procedure to remove fluid from his lungs and stated he is feeling better as a result. Pt reported prior to this admit he was ambulatory and able to independently care for himself. Pt denied having any DME needs prior to admit.    Pt lives by himself in an apartment and denies any local friends or family. Pt works full-time, but will be applying for FMLA and short term disability as a result of his recent medical concerns. Pt denies any history of alcohol or drug abuse or mental health concerns. Pt does not have a DPOA, but accepted [facility]'s offer for information and a booklet. Pt denied feeling he needs any help with referrals.     Information Source  Orientation : Oriented x 4  Information Given By: Patient  Informant's Name: Abner Torres  Who is responsible for making decisions for patient? : Patient    Readmission Evaluation  Is this a readmission?: Yes - unplanned readmission  Why do you think you were readmitted?: \"I was told to come back if I didn't feel better and I didn't feel better.\"  Was an appointment arranged for you prior to discharge?: No  Did you understand your discharge instructions?: Yes  Did you have enough support after your last discharge?: Yes    Elopement Risk  Legal Hold: No  Ambulatory or Self Mobile in Wheelchair: Yes  Disoriented: No  Psychiatric Symptoms: None  History of Wandering: No  Elopement this Admit: No  Vocalizing Wanting to Leave: No  Displays Behaviors, Body Language Wanting to Leave: No-Not at Risk for Elopement  Elopement Risk: Not at Risk for Elopement    Interdisciplinary Discharge Planning  Does Admitting Nurse Feel This Could be a Complex Discharge?: No  Lives with - Patient's Self Care " Capacity: Alone and Able to Care For Self  Patient or legal guardian wants to designate a caregiver (see row info): No  Support Systems: None  Housing / Facility: 1 Story Apartment / Condo  Do You Take your Prescribed Medications Regularly: Yes  Able to Return to Previous ADL's: Yes  Mobility Issues: No  Prior Services: None  Patient Expects to be Discharged to:: Home  Assistance Needed: No  Durable Medical Equipment: Not Applicable    Discharge Preparedness  What is your plan after discharge?: Uncertain - pending medical team collaboration  What are your discharge supports?: Other (comment) (Pt has family out of state. )  Prior Functional Level: Ambulatory, Drives Self, Independent with Activities of Daily Living  Difficulity with ADLs: None  Difficulity with IADLs: None    Functional Assesment  Prior Functional Level: Ambulatory, Drives Self, Independent with Activities of Daily Living    Finances  Financial Barriers to Discharge: No  Prescription Coverage: Yes    Vision / Hearing Impairment  Vision Impairment : No  Hearing Impairment : No    Values / Beliefs / Concerns  Values / Beliefs Concerns : No    Advance Directive  Advance Directive?: None    Domestic Abuse  Have you ever been the victim of abuse or violence?: No  Physical Abuse or Sexual Abuse: No  Verbal Abuse or Emotional Abuse: No  Possible Abuse Reported to:: Not Applicable    Psychological Assessment  History of Substance Abuse: None  History of Psychiatric Problems: No  Non-compliant with Treatment: No  Newly Diagnosed Illness: No    Discharge Risks or Barriers  Discharge risks or barriers?: No

## 2017-03-14 NOTE — OR SURGEON
EXAMINATION:  3/14/2017 8:58 AM    HISTORY/REASON FOR EXAM:  Fluid collection  RIGHT pleural effusion.    TECHNIQUE/EXAM DESCRIPTION:   Ultrasound-guided thoracentesis.    Indication:  RIGHT pleural fluid collection.    COMPARISON:  3/13/2017    PROCEDURE:     Informed consent was obtained. A timeout was taken. A right pleural effusion was localized with real-time ultrasound guidance. The right posterior chest wall was prepped and draped in a sterile manner. Following local anesthesia with 1% lidocaine, a 5 [place] Yueh pigtail catheter was advanced into the pleural space with trocar technique and pleural fluid was drained. The patient tolerated the procedure well without evidence of complication. A post thoracentesis chest radiograph is forthcoming.    FINDINGS:  Large RIGHT pleural fluid collection demonstrated.    Fluid was sent to the laboratory.    Fluid character: straw colored    IMPRESSION:  1. Ultrasound guided right sided diagnostic/therapeutic thoracentesis.    2. 2000 mL of fluid withdrawn.

## 2017-03-15 NOTE — PROGRESS NOTES
Hospital Medicine Progress Note, Adult, Complex               Author: Ayde Gandhi  Date & Time created: 3/15/2017  2:20 PM     ID/CC: 57 y/o F admitted for SOB, found to have acute on chronic heart failure, and pleural effusion    Interval History:  Pt seen and examined, afebrile states breathing is better after thoracentesis done yesterday, fluid culture NGTD     Review of Systems:  Review of Systems   Constitutional: Positive for malaise/fatigue. Negative for fever, chills and diaphoresis.   HENT: Negative for congestion and sore throat.    Eyes: Negative for pain and discharge.   Respiratory: Positive for shortness of breath (improved). Negative for hemoptysis and sputum production.    Cardiovascular: Negative for chest pain, palpitations, claudication and leg swelling.   Gastrointestinal: Negative for nausea, vomiting, abdominal pain, diarrhea, constipation and melena.   Genitourinary: Negative for dysuria, urgency and frequency.   Musculoskeletal: Negative for myalgias, back pain, joint pain and neck pain.   Skin: Negative for itching and rash.   Neurological: Negative for dizziness, sensory change, speech change, focal weakness, loss of consciousness, weakness and headaches.   Endo/Heme/Allergies: Does not bruise/bleed easily.   Psychiatric/Behavioral: Negative for depression, suicidal ideas and substance abuse.       Physical Exam:  Physical Exam   Constitutional: He is oriented to person, place, and time. He appears well-developed and well-nourished. No distress.   HENT:   Head: Normocephalic and atraumatic.   Mouth/Throat: Oropharynx is clear and moist. No oropharyngeal exudate.   Eyes: Conjunctivae and EOM are normal. Pupils are equal, round, and reactive to light. Right eye exhibits no discharge. Left eye exhibits no discharge. No scleral icterus.   Neck: Normal range of motion. Neck supple. No JVD present. No tracheal deviation present. No thyromegaly present.   Cardiovascular: Normal rate, regular rhythm  and normal heart sounds.  Exam reveals no gallop and no friction rub.    No murmur heard.  Pulmonary/Chest: He is in respiratory distress. He has wheezes. He has no rales. He exhibits no tenderness.   Abdominal: Soft. Bowel sounds are normal. He exhibits no distension and no mass. There is no tenderness. There is no rebound and no guarding.   Musculoskeletal: Normal range of motion. He exhibits no edema or tenderness.   No calf pain or edema on exam.   Lymphadenopathy:     He has no cervical adenopathy.   Neurological: He is alert and oriented to person, place, and time. No cranial nerve deficit. He exhibits normal muscle tone.   Skin: Skin is warm and dry. No rash noted. He is not diaphoretic. No erythema.   Psychiatric: He has a normal mood and affect. His behavior is normal. Judgment and thought content normal.   Nursing note and vitals reviewed.      Labs:        Invalid input(s): [id number]  Recent Labs      03/12/17 2257 03/14/17 0019   TROPONINI  0.02   --    BNPBTYPENAT  2027*  3035*     Recent Labs      03/12/17   2257  03/14/17   0019  03/15/17   0200   SODIUM  135  140  134*   POTASSIUM  4.1  4.0  4.0   CHLORIDE  103  104  100   CO2  23  27  27   BUN  25*  26*  32*   CREATININE  0.82  0.75  0.98   CALCIUM  8.7  8.5  8.6     Recent Labs      03/12/17   2257  03/14/17   0019  03/15/17   0200   ALTSGPT  27   --    --    ASTSGOT  19   --    --    ALKPHOSPHAT  172*   --    --    TBILIRUBIN  0.8   --    --    LIPASE  6*   --    --    GLUCOSE  382*  291*  410*     Recent Labs      03/12/17   2257  03/14/17   0019  03/15/17   0200   RBC  5.01  4.46*  4.78   HEMOGLOBIN  13.3*  11.8*  12.6*   HEMATOCRIT  42.6  37.2*  39.7*   PLATELETCT  227  186  212   PROTHROMBTM  13.6   --    --    APTT  33.3   --    --    INR  1.01   --    --      Recent Labs      03/12/17   2257  03/14/17   0019  03/15/17   0200   WBC  8.6  9.9  11.1*   NEUTSPOLYS  67.00  92.10*   --    LYMPHOCYTES  21.70*  7.00*   --    MONOCYTES   4.30  0.00   --    EOSINOPHILS  4.40  0.00   --    BASOPHILS  0.00  0.00   --    ASTSGOT  19   --    --    ALTSGPT  27   --    --    ALKPHOSPHAT  172*   --    --    TBILIRUBIN  0.8   --    --            Hemodynamics:  Temp (24hrs), Av.7 °C (98 °F), Min:36.2 °C (97.2 °F), Max:37.1 °C (98.7 °F)  Temperature: 36.6 °C (97.9 °F)  Pulse  Av.8  Min: 62  Max: 84   Blood Pressure: 127/77 mmHg     Respiratory:    Respiration: 18, Pulse Oximetry: 94 %, O2 Daily Delivery Respiratory : Room Air with O2 Available     Given By:: Mouthpiece, Work Of Breathing / Effort: Mild  RUL Breath Sounds: Clear, RML Breath Sounds: Diminished, RLL Breath Sounds: Diminished, MIKE Breath Sounds: Clear, LLL Breath Sounds: Diminished  Fluids:    Intake/Output Summary (Last 24 hours) at 03/15/17 1420  Last data filed at 03/15/17 0000   Gross per 24 hour   Intake      0 ml   Output    875 ml   Net   -875 ml     Weight: 85.9 kg (189 lb 6 oz)  GI/Nutrition:  Orders Placed This Encounter   Procedures   • DIET ORDER     Standing Status: Standing      Number of Occurrences: 1      Standing Expiration Date:      Order Specific Question:  Diet:     Answer:  Diabetic [3]     Medical Decision Making, by Problem:  Active Hospital Problems    Diagnosis   • *CHF (congestive heart failure) (CMS-[facility]) [I50.9]  EF 30%  2/2 ischemic cardiomyopathy  -cont diuresis with  IV lasix, also cont ace , BB and spironolactone.     Acute on chronic respiratory distress:   RT protocol Albuterol neb treatments q 4 hours, steroids switched to oral   CXR with large layering of infiltrate RML  On Rocephin IV and Doxy IV     Pleural effusions:  - s/p thoracentesis with 2 L removal.   2/2 CABG 10/2016  Cultures negative   H/o pleurodesis??    CAD  S/p CABG  -cont lipitor,  coreg and ASA    DM2:  -cont lantus, SSI and accuchecks      Dispo: Pt/OT eval     Yeager catheter: No Yeager      DVT Prophylaxis: Heparin      Antibiotics: Treating active infection/contamination beyond 24  hours perioperative coverage  Assessed for rehab: Patient returned to prior level of function, rehabilitation not indicated at this time

## 2017-03-15 NOTE — CARE PLAN
Problem: Venous Thromboembolism (VTW)/Deep Vein Thrombosis (DVT) Prevention:  Goal: Patient will participate in Venous Thrombosis (VTE)/Deep Vein Thrombosis (DVT)Prevention Measures  Outcome: PROGRESSING AS EXPECTED    Problem: Knowledge Deficit  Goal: Knowledge of disease process/condition, treatment plan, diagnostic tests, and medications will improve  Outcome: PROGRESSING AS EXPECTED  Pt educated regarding plan of care and medications. All questions answered.     Problem: Respiratory:  Goal: Respiratory status will improve  Outcome: PROGRESSING AS EXPECTED  Pt went from 4 L demand to room air.

## 2017-03-16 NOTE — PROGRESS NOTES
Received bedside report from LACHELLE Emery. Patient is A/OX4. No c/o pain or discomfort. Call bell within patient reach, bed at low position. Will continue with plan of care.

## 2017-03-16 NOTE — PROGRESS NOTES
Hospital Medicine Progress Note, Adult, Complex               Author: Ayde Gandhi  Date & Time created: 3/16/2017  3:17 PM     ID/CC: 55 y/o F admitted for SOB, found to have acute on chronic heart failure, and pleural effusion    Interval History:   afebrile states breathing is better after thoracentesis done yesterday, fluid culture NGTD     Review of Systems:  Review of Systems   Constitutional: Positive for malaise/fatigue. Negative for fever, chills and diaphoresis.   HENT: Negative for congestion and sore throat.    Eyes: Negative for pain and discharge.   Respiratory: Positive for shortness of breath (improved). Negative for hemoptysis and sputum production.    Cardiovascular: Negative for chest pain, palpitations, claudication and leg swelling.   Gastrointestinal: Negative for nausea, vomiting, abdominal pain, diarrhea, constipation and melena.   Genitourinary: Negative for dysuria, urgency and frequency.   Musculoskeletal: Negative for myalgias, back pain, joint pain and neck pain.   Skin: Negative for itching and rash.   Neurological: Negative for dizziness, sensory change, speech change, focal weakness, loss of consciousness, weakness and headaches.   Endo/Heme/Allergies: Does not bruise/bleed easily.   Psychiatric/Behavioral: Negative for depression, suicidal ideas and substance abuse.       Physical Exam:  Physical Exam   Constitutional: He is oriented to person, place, and time. He appears well-developed and well-nourished. No distress.   HENT:   Head: Normocephalic and atraumatic.   Mouth/Throat: Oropharynx is clear and moist. No oropharyngeal exudate.   Eyes: Conjunctivae and EOM are normal. Pupils are equal, round, and reactive to light. Right eye exhibits no discharge. Left eye exhibits no discharge. No scleral icterus.   Neck: Normal range of motion. Neck supple. No JVD present. No tracheal deviation present. No thyromegaly present.   Cardiovascular: Normal rate, regular rhythm and normal heart  sounds.  Exam reveals no gallop and no friction rub.    No murmur heard.  Pulmonary/Chest: He is in respiratory distress. He has wheezes. He has no rales. He exhibits no tenderness.   Abdominal: Soft. Bowel sounds are normal. He exhibits no distension and no mass. There is no tenderness. There is no rebound and no guarding.   Musculoskeletal: Normal range of motion. He exhibits no edema or tenderness.   No calf pain or edema on exam.   Lymphadenopathy:     He has no cervical adenopathy.   Neurological: He is alert and oriented to person, place, and time. No cranial nerve deficit. He exhibits normal muscle tone.   Skin: Skin is warm and dry. No rash noted. He is not diaphoretic. No erythema.   Psychiatric: He has a normal mood and affect. His behavior is normal. Judgment and thought content normal.   Nursing note and vitals reviewed.      Labs:        Invalid input(s): [id number]  Recent Labs      17   BNPBTYPENAT  3035*     Recent Labs      03/14/17   0019  03/15/17   0200  03/16/17   0325   SODIUM  140  134*  137   POTASSIUM  4.0  4.0  3.5*   CHLORIDE  104  100  101   CO2  27  27  31   BUN  26*  32*  32*   CREATININE  0.75  0.98  0.86   CALCIUM  8.5  8.6  8.2*     Recent Labs      03/14/17   0019  03/15/17   0200  03/16/17   0325   GLUCOSE  291*  410*  261*     Recent Labs      03/14/17   0019  03/15/17   0200  03/16/17   0325   RBC  4.46*  4.78  4.76   HEMOGLOBIN  11.8*  12.6*  12.6*   HEMATOCRIT  37.2*  39.7*  39.7*   PLATELETCT  186  212  235     Recent Labs      03/14/17   0019  03/15/17   0200  03/16/17   0325   WBC  9.9  11.1*  12.4*   NEUTSPOLYS  92.10*   --    --    LYMPHOCYTES  7.00*   --    --    MONOCYTES  0.00   --    --    EOSINOPHILS  0.00   --    --    BASOPHILS  0.00   --    --            Hemodynamics:  Temp (24hrs), Av.4 °C (97.5 °F), Min:36.2 °C (97.1 °F), Max:36.6 °C (97.8 °F)  Temperature: 36.3 °C (97.4 °F)  Pulse  Av.6  Min: 62  Max: 84   Blood Pressure: 139/80 mmHg      Respiratory:    Respiration: 20, Pulse Oximetry: 92 %     Work Of Breathing / Effort: Mild  RUL Breath Sounds: Clear, RML Breath Sounds: Diminished, RLL Breath Sounds: Diminished, MIKE Breath Sounds: Clear, LLL Breath Sounds: Diminished  Fluids:    Intake/Output Summary (Last 24 hours) at 03/16/17 1517  Last data filed at 03/16/17 0900   Gross per 24 hour   Intake    320 ml   Output   2500 ml   Net  -2180 ml     Weight: 84.3 kg (185 lb 13.6 oz)  GI/Nutrition:  Orders Placed This Encounter   Procedures   • DIET ORDER     Standing Status: Standing      Number of Occurrences: 1      Standing Expiration Date:      Order Specific Question:  Diet:     Answer:  Diabetic [3]     Medical Decision Making, by Problem:  Active Hospital Problems    Diagnosis   • *CHF (congestive heart failure) (CMS-[facility]) [I50.9]  EF 30%  2/2 ischemic cardiomyopathy  -cont diuresis with  IV lasix, also cont ace , BB and spironolactone.     Acute on chronic respiratory distress:   RT protocol Albuterol neb treatments q 4 hours, steroids switched to oral   CXR with large layering of infiltrate RML  On Rocephin IV and Doxy IV     Pleural effusions:  - s/p thoracentesis with 2 L removal.   2/2 CABG 10/2016  Cultures negative   H/o pleurodesis??    CAD  S/p CABG  -cont lipitor,  coreg and ASA    DM2:  -cont lantus, SSI and accuchecks      Dispo: Pt/OT eval     Yeager catheter: No Yeager      DVT Prophylaxis: Heparin      Antibiotics: Treating active infection/contamination beyond 24 hours perioperative coverage  Assessed for rehab: Patient returned to prior level of function, rehabilitation not indicated at this time

## 2017-03-16 NOTE — CARE PLAN
Problem: Knowledge Deficit  Goal: Knowledge of disease process/condition, treatment plan, diagnostic tests, and medications will improve  Outcome: PROGRESSING AS EXPECTED  Discussed with patient disease process/ condition and treatment plan. Patient agrees to all and states understanding.     Problem: Mobility  Goal: Risk for activity intolerance will decrease  Outcome: PROGRESSING SLOWER THAN EXPECTED  Patient ambulated in the room. Patient needs lots of rest periods in between.

## 2017-03-16 NOTE — PROGRESS NOTES
Tele monitor reported at 1215 patient had 15 beats of VTACH. Pt sleeping but easily aroused. Pt A/OX4 no c/o pain. Vital signs stable. Dr. Gandhi paged and notified.

## 2017-03-17 NOTE — PROGRESS NOTES
Assumed care of patient at 0715, received bedside report from night shift RN. Bed is locked and in lowest position with call light within reach. Treaded socks in place. Patient updated on plan of care, no complaints or pain at this time. White board updated. Assessment completed. Pt A&Ox4. Tele monitor in place and cardiac rhythm being monitored. All needs met at this time.

## 2017-03-17 NOTE — DISCHARGE INSTRUCTIONS
Discharge Instructions    Discharged to home by car with friend. Discharged via wheelchair, hospital escort: Yes.  Special equipment needed: Not Applicable    Be sure to schedule a follow-up appointment with your primary care doctor or any specialists as instructed.     Discharge Plan:   Diet Plan: Discussed  Activity Level: Discussed  Confirmed Follow up Appointment: Appointment Scheduled  Confirmed Symptoms Management: Discussed  Medication Reconciliation Updated: Yes  Influenza Vaccine Indication: Not indicated: Previously immunized this influenza season and > 8 years of age    I understand that a diet low in cholesterol, fat, and sodium is recommended for good health. Unless I have been given specific instructions below for another diet, I accept this instruction as my diet prescription.   Other diet: Heart healthy diet    Special Instructions:   HF Patient Discharge Instructions  · Monitor your weight daily, and maintain a weight chart, to track your weight changes.   · Activity as tolerated, unless your Doctor has ordered otherwise. Other activity order: Heart healthy diet.  · Follow a low fat, low cholesterol, low salt diet unless instructed otherwise by your Doctor. Read the labels on the back of food products and track your intake of fat, cholesterol and salt.   · Fluid Restriction No. If a Fluid Restriction has been ordered by your Doctor, measure fluids with a measuring cup to ensure that you are not exceeding the restriction.   · No smoking.  · Oxygen No. If your Doctor has ordered that you wear Oxygen at home, it is important to wear it as ordered.  · Did you receive an explanation from staff on the importance of taking each of your medications and why it is necessary to keep taking them unless your doctor says to stop? Yes  · Were all of your questions answered about how to manage your heart failure and what to do if you have increased signs and symptoms after you go home? Yes  · Do you feel like your  heart failure care team involved you in the care treatment plan and allowed you to make decisions regarding your care while in the hospital and addressed any discharge needs you might have? Yes    See the educational handout provided at discharge for more information on monitoring your daily weight, activity and diet. This also explains more about Heart Failure, symptoms of a flare-up and some of the tests that you have undergone.     Warning Signs of a Flare-Up include:  · Swelling in the ankles or lower legs.  · Shortness of breath, while at rest, or while doing normal activities.   · Shortness of breath at night when in bed, or coughing in bed.   · Requiring more pillows to sleep at night, or needing to sit up at night to sleep.  · Feeling weak, dizzy or fatigued.     When to call your Doctor:  · Call [address] floor about questions regarding the discharge instructions you were given [phone].  · Call your Primary Care Physician or Cardiologist if:   ¨ You experience any pain radiating to your jaw or neck.  ¨ You have any difficulty breathing.  ¨ You experience weight gain of 3 lbs in a day or 5 lbs in a week.   ¨ You feel any palpitations or irregular heartbeats.  ¨ You become dizzy or lose consciousness.   If you have had an angiogram or had a pacemaker or AICD placed, and experience:  · Bleeding, drainage or swelling at the surgical / puncture site.  · Fever greater than 100.0 F  · Shock from internal defibrillator.  · Cool and / or numb extremities.      · Is patient discharged on Warfarin / Coumadin?   No     · Is patient Post Blood Transfusion?  No    Depression / Suicide Risk    As you are discharged from this [facility], it is important to learn how to keep safe from harming yourself.    Recognize the warning signs:  · Abrupt changes in personality, positive or negative- including increase in energy   · Giving away possessions  · Change in eating patterns- significant weight  changes-  positive or negative  · Change in sleeping patterns- unable to sleep or sleeping all the time   · Unwillingness or inability to communicate  · Depression  · Unusual sadness, discouragement and loneliness  · Talk of wanting to die  · Neglect of personal appearance   · Rebelliousness- reckless behavior  · Withdrawal from people/activities they love  · Confusion- inability to concentrate     If you or a loved one observes any of these behaviors or has concerns about self-harm, here's what you can do:  · Talk about it- your feelings and reasons for harming yourself  · Remove any means that you might use to hurt yourself (examples: pills, rope, extension cords, firearm)  · Get professional help from the community (Mental Health, Substance Abuse, psychological counseling)  · Do not be alone:Call your Safe Contact- someone whom you trust who will be there for you.  · Call your local CRISIS HOTLINE 934-4372 or 701-004-1910  · Call your local Children's Mobile Crisis Response Team [place] (626) 997-8756 or www.[employer]  · Call the toll free National Suicide Prevention Hotlines   · National Suicide Prevention Lifeline 807-924-OPVQ (4368)  · National Hope Line Network 800-SUICIDE (352-7485)    Heart Failure  Heart failure is a condition in which the heart has trouble pumping blood. This means your heart does not pump blood efficiently for your body to work well. In some cases of heart failure, fluid may back up into your lungs or you may have swelling (edema) in your lower legs. Heart failure is usually a long-term (chronic) condition. It is important for you to take good care of yourself and follow your health care provider's treatment plan.  CAUSES   Some health conditions can cause heart failure. Those health conditions include:  · High blood pressure (hypertension). Hypertension causes the heart muscle to work harder than normal. When pressure in the blood vessels is high, the heart needs to pump  (contract) with more force in order to circulate blood throughout the body. High blood pressure eventually causes the heart to become stiff and weak.  · Coronary artery disease (CAD). CAD is the buildup of cholesterol and fat (plaque) in the arteries of the heart. The blockage in the arteries deprives the heart muscle of oxygen and blood. This can cause chest pain and may lead to a heart attack. High blood pressure can also contribute to CAD.  · Heart attack (myocardial infarction). A heart attack occurs when one or more arteries in the heart become blocked. The loss of oxygen damages the muscle tissue of the heart. When this happens, part of the heart muscle dies. The injured tissue does not contract as well and weakens the heart's ability to pump blood.  · Abnormal heart valves. When the heart valves do not open and close properly, it can cause heart failure. This makes the heart muscle pump harder to keep the blood flowing.  · Heart muscle disease (cardiomyopathy or myocarditis). Heart muscle disease is damage to the heart muscle from a variety of causes. These can include drug or alcohol abuse, infections, or unknown reasons. These can increase the risk of heart failure.  · Lung disease. Lung disease makes the heart work harder because the lungs do not work properly. This can cause a strain on the heart, leading it to fail.  · Diabetes. Diabetes increases the risk of heart failure. High blood sugar contributes to high fat (lipid) levels in the blood. Diabetes can also cause slow damage to tiny blood vessels that carry important nutrients to the heart muscle. When the heart does not get enough oxygen and food, it can cause the heart to become weak and stiff. This leads to a heart that does not contract efficiently.  · Other conditions can contribute to heart failure. These include abnormal heart rhythms, thyroid problems, and low blood counts (anemia).  Certain unhealthy behaviors can increase the risk of heart  failure, including:  · Being overweight.  · Smoking or chewing tobacco.  · Eating foods high in fat and cholesterol.  · Abusing illicit drugs or alcohol.  · Lacking physical activity.  SYMPTOMS   Heart failure symptoms may vary and can be hard to detect. Symptoms may include:  · Shortness of breath with activity, such as climbing stairs.  · Persistent cough.  · Swelling of the feet, ankles, legs, or abdomen.  · Unexplained weight gain.  · Difficulty breathing when lying flat (orthopnea).  · Waking from sleep because of the need to sit up and get more air.  · Rapid heartbeat.  · Fatigue and loss of energy.  · Feeling light-headed, dizzy, or close to fainting.  · Loss of appetite.  · Nausea.  · Increased urination during the night (nocturia).  DIAGNOSIS   A diagnosis of heart failure is based on your history, symptoms, physical examination, and diagnostic tests. Diagnostic tests for heart failure may include:  · Echocardiography.  · Electrocardiography.  · Chest X-ray.  · Blood tests.  · Exercise stress test.  · Cardiac angiography.  · Radionuclide scans.  TREATMENT   Treatment is aimed at managing the symptoms of heart failure. Medicines, behavioral changes, or surgical intervention may be necessary to treat heart failure.  · Medicines to help treat heart failure may include:  · Angiotensin-converting enzyme (ACE) inhibitors. This type of medicine blocks the effects of a blood protein called angiotensin-converting enzyme. ACE inhibitors relax (dilate) the blood vessels and help lower blood pressure.  · Angiotensin receptor blockers (ARBs). This type of medicine blocks the actions of a blood protein called angiotensin. Angiotensin receptor blockers dilate the blood vessels and help lower blood pressure.  · Water pills (diuretics). Diuretics cause the kidneys to remove salt and water from the blood. The extra fluid is removed through urination. This loss of extra fluid lowers the volume of blood the heart  pumps.  · Beta blockers. These prevent the heart from beating too fast and improve heart muscle strength.  · Digitalis. This increases the force of the heartbeat.  · Healthy behavior changes include:  · Obtaining and maintaining a healthy weight.  · Stopping smoking or chewing tobacco.  · Eating heart-healthy foods.  · Limiting or avoiding alcohol.  · Stopping illicit drug use.  · Physical activity as directed by your health care provider.  · Surgical treatment for heart failure may include:  · A procedure to open blocked arteries, repair damaged heart valves, or remove damaged heart muscle tissue.  · A pacemaker to improve heart muscle function and control certain abnormal heart rhythms.  · An internal cardioverter defibrillator to treat certain serious abnormal heart rhythms.  · A left ventricular assist device (LVAD) to assist the pumping ability of the heart.  HOME CARE INSTRUCTIONS   · Take medicines only as directed by your health care provider. Medicines are important in reducing the workload of your heart, slowing the progression of heart failure, and improving your symptoms.  · Do not stop taking your medicine unless directed by your health care provider.  · Do not skip any dose of medicine.  · Refill your prescriptions before you run out of medicine. Your medicines are needed every day.  · Engage in moderate physical activity if directed by your health care provider. Moderate physical activity can benefit some people. The elderly and people with severe heart failure should consult with a health care provider for physical activity recommendations.  · Eat heart-healthy foods. Food choices should be free of trans fat and low in saturated fat, cholesterol, and salt (sodium). Healthy choices include fresh or frozen fruits and vegetables, fish, lean meats, legumes, fat-free or low-fat dairy products, and whole grain or high fiber foods. Talk to a dietitian to learn more about heart-healthy foods.  · Limit sodium  if directed by your health care provider. Sodium restriction may reduce symptoms of heart failure in some people. Talk to a dietitian to learn more about heart-healthy seasonings.  · Use healthy cooking methods. Healthy cooking methods include roasting, grilling, broiling, baking, poaching, steaming, or stir-frying. Talk to a dietitian to learn more about healthy cooking methods.  · Limit fluids if directed by your health care provider. Fluid restriction may reduce symptoms of heart failure in some people.  · Weigh yourself every day. Daily weights are important in the early recognition of excess fluid. You should weigh yourself every morning after you urinate and before you eat breakfast. Wear the same amount of clothing each time you weigh yourself. Record your daily weight. Provide your health care provider with your weight record.  · Monitor and record your blood pressure if directed by your health care provider.  · Check your pulse if directed by your health care provider.  · Lose weight if directed by your health care provider. Weight loss may reduce symptoms of heart failure in some people.  · Stop smoking or chewing tobacco. Nicotine makes your heart work harder by causing your blood vessels to constrict. Do not use nicotine gum or patches before talking to your health care provider.  · Keep all follow-up visits as directed by your health care provider. This is important.  · Limit alcohol intake to no more than 1 drink per day for nonpregnant women and 2 drinks per day for men. One drink equals 12 ounces of beer, 5 ounces of wine, or 1½ ounces of hard liquor. Drinking more than that is harmful to your heart. Tell your health care provider if you drink alcohol several times a week. Talk with your health care provider about whether alcohol is safe for you. If your heart has already been damaged by alcohol or you have severe heart failure, drinking alcohol should be stopped completely.  · Stop illicit drug  use.  · Stay up-to-date with immunizations. It is especially important to prevent respiratory infections through current pneumococcal and influenza immunizations.  · Manage other health conditions such as hypertension, diabetes, thyroid disease, or abnormal heart rhythms as directed by your health care provider.  · Learn to manage stress.  · Plan rest periods when fatigued.  · Learn strategies to manage high temperatures. If the weather is extremely hot:  · Avoid vigorous physical activity.  · Use air conditioning or fans or seek a cooler location.  · Avoid caffeine and alcohol.  · Wear loose-fitting, lightweight, and light-colored clothing.  · Learn strategies to manage cold temperatures. If the weather is extremely cold:  · Avoid vigorous physical activity.  · Layer clothes.  · Wear mittens or gloves, a hat, and a scarf when going outside.  · Avoid alcohol.  · Obtain ongoing education and support as needed.  · Participate in or seek rehabilitation as needed to maintain or improve independence and quality of life.  SEEK MEDICAL CARE IF:   · You have a rapid weight gain.  · You have increasing shortness of breath that is unusual for you.  · You are unable to participate in your usual physical activities.  · You tire easily.  · You cough more than normal, especially with physical activity.  · You have any or more swelling in areas such as your hands, feet, ankles, or abdomen.  · You are unable to sleep because it is hard to breathe.  · You feel like your heart is beating fast (palpitations).  · You become dizzy or light-headed upon standing up.  SEEK IMMEDIATE MEDICAL CARE IF:   · You have difficulty breathing.  · There is a change in mental status such as decreased alertness or difficulty with concentration.  · You have a pain or discomfort in your chest.  · You have an episode of fainting (syncope).  MAKE SURE YOU:   · Understand these instructions.  · Will watch your condition.  · Will get help right away if you  are not doing well or get worse.     This information is not intended to replace advice given to you by your health care provider. Make sure you discuss any questions you have with your health care provider.     Document Released: 12/18/2006 Document Revised: 05/03/2016 Document Reviewed: 01/17/2014  [employer] Interactive Patient Education ©2016 [employer]    Pleural Effusion  A pleural effusion is an abnormal buildup of fluid in the layers of tissue between your lungs and the inside of your chest (pleural space). These two layers of tissue that line both your lungs and the inside of your chest are called pleura. Usually, there is no air in the space between the pleura, only a thin layer of fluid. If left untreated, a large amount of fluid can build up and cause the lung to collapse. A pleural effusion is usually caused by another disease that requires treatment.  The two main types of pleural effusion are:  · Transudative pleural effusion. This happens when fluid leaks into the pleural space because of a low protein count in your blood or high blood pressure in your vessels. Heart failure often causes this.  · Exudative infusion. This occurs when fluid collects in the pleural space from blocked blood vessels or lymph vessels. Some lung diseases, injuries, and cancers can cause this type of effusion.  CAUSES  Pleural effusion can be caused by:  · Heart failure.  · A blood clot in the lung (pulmonary embolism).  · Pneumonia.  · Cancer.  · Liver failure (cirrhosis).  · Kidney disease.  · Complications from surgery, such as from open heart surgery.  SIGNS AND SYMPTOMS  In some cases, pleural effusion may cause no symptoms. Symptoms can include:  · Shortness of breath, especially when lying down.  · Chest pain, often worse when taking a deep breath.  · Fever.  · Dry cough that is lasting (chronic).  · Hiccups.  · Rapid breathing.  An underlying condition that is causing the pleural effusion (such as heart failure,  pneumonia, blood clots, tuberculosis, or cancer) may also cause additional symptoms.  DIAGNOSIS  Your health care provider may suspect pleural effusion based on your symptoms and medical history. Your health care provider will also do a physical exam and a chest X-ray. If the X-ray shows there is fluid in your chest, you may need to have this fluid removed using a needle (thoracentesis) so it can be tested.  You may also have:  · Imaging studies of the chest, such as:  ¨ Ultrasound.  ¨ CT scan.  · Blood tests for kidney and liver function.  TREATMENT  Treatment depends on the cause of the pleural effusion. Treatment may include:  · Taking antibiotic medicines to clear up an infection that is causing the pleural effusion.  · Placing a tube in the chest to drain the effusion (tube thoracostomy). This procedure is often used when there is an infection in the fluid.  · Surgery to remove the fibrous outer layer of tissue from the pleural space (decortication).  · Thoracentesis, which can improve cough and shortness of breath.  · A procedure to put medicine into the chest cavity to seal the pleural space to prevent fluid buildup (pleurodesis).  · Chemotherapy and radiation therapy. These may be required in the case of cancerous (malignant) pleural effusion.  HOME CARE INSTRUCTIONS  · Take medicines only as directed by your health care provider.  · Keep track of how long you can gently exercise before you get short of breath. Try simply walking at first.  · Do not use any tobacco products, including cigarettes, chewing tobacco, or electronic cigarettes. If you need help quitting, ask your health care provider.  · Keep all follow-up visits as directed by your health care provider. This is important.  SEEK MEDICAL CARE IF:  · The amount of time that you are able to exercise decreases or does not improve with time.  · You have pain or signs of infection at the puncture site if you had thoracentesis. Watch  for:  ¨ Drainage.  ¨ Redness.  ¨ Swelling.  · You have a fever.  SEEK IMMEDIATE MEDICAL CARE IF:  · You are short of breath.  · You develop chest pain.  · You develop a new cough.  MAKE SURE YOU:  · Understand these instructions.  · Will watch your condition.  · Will get help right away if you are not doing well or get worse.     This information is not intended to replace advice given to you by your health care provider. Make sure you discuss any questions you have with your health care provider.     Document Released: 12/18/2006 Document Revised: 01/08/2016 Document Reviewed: 05/13/2015  [employer] Interactive Patient Education ©2016 [employer]

## 2017-03-17 NOTE — PROGRESS NOTES
Received report from Mary LAROSE and assumed care of pt. Pt is A&Ox4. No signs or symptoms of pain or distress. Assessment completed and plan of care discussed. Pt verbalized understanding of call light and communication board. Patient questions answered at this time. Fall precautions in place. Patient needs met at this time. Hourly rounding in place.

## 2017-03-17 NOTE — TELEPHONE ENCOUNTER
Chart evaluated in accordance with IVC filter protocol.  Pt is currently admitted.  Will continue to follow.    Gypsy LOPEZ

## 2017-03-17 NOTE — CARE PLAN
Problem: Safety  Goal: Will remain free from injury  Outcome: PROGRESSING AS EXPECTED  Pt educated on the importance fall prevention methods, such as treaded sock and the bed alarm. Pt stated they will use the call light prior to any attempts of ambulation. Ambulatory ability assessed, treaded socks in place, bed locked and in low position, frequent trips to bathroom offered, IV pole on same side of bed as bathroom, and call light and phone within reach.          Problem: Skin Integrity  Goal: Risk for impaired skin integrity will decrease  Outcome: PROGRESSING AS EXPECTED  Complete skin assessment done.  Areas of skin breakdown assessed, interventions in place.  Educated on the importance of turns and ambulation with respect to skin integrity. Pt verbalized understanding.

## 2017-03-18 NOTE — PROGRESS NOTES
Confirmed with Dr. Gandhi patient okay to discharge. All lines and monitor discontinued. Discharge instructions and medications discussed with patient, all questions answered, patient verbalizes understanding. Follow up appointments scheduled. Pneumonia and influenza vaccines not indicated. Discharge instructions, prescriptions, and personal belongings with patient. Patient down to car in wheelchair with patient transport, off unit without incident. Tele monitor signed in and returned to [facility].

## 2017-03-19 NOTE — DISCHARGE SUMMARY
Hospital Medicine Discharge Note     Admit Date:  3/12/2017       Discharge Date:   3/17/2017  LOS: 4 days     Primary Care Provider:    NNEKA Christine    Attending Physician:  Ayde Gandhi     Discharge Diagnoses:   CHF (congestive heart failure) (CMS-[facility])  Pleural Effusion  Pneumonia    Chronic Medical Problems:  Chronic congestive heart failure with ejection fraction   of 35%, diabetes type 2, coronary artery disease, hyperlipidemia, medical    noncompliance, and recurrent pleural effusion.    Consultants:      None    Studies:  Wbc 12, Hemoglobin 13.4, Hematocrit 41.8, Platelet 232, Sodium 135, Potassium 3.9, Chloride 99, Bicarb 31, Glucose 325, BUN 32, Creatinine 0.72    Imaging/ Testing:      US-CHEST   Large right and small left pleural effusion.   DX-CHEST-PORTABLE (1 VIEW)   1.  Interval improvement of RIGHT pleural fluid collection.   2.  No pneumothorax.   3.  No other significant change from prior exam.   US-THORACENTESIS PUNCTURE RIGHT   1. Ultrasound guided right sided diagnostic/therapeutic thoracentesis.   2. 2000 mL of fluid withdrawn.   DX-CHEST-PORTABLE (1 VIEW)   1. Mild interstitial edema and worsening bilateral pleural effusions, left more than right.     Procedures:        None    Hospital Summary (Brief Narrative):       For H and P on admission, please refer to full H and P dictated by Dr. Morton  In brief, Abner Torres is a 56 y.o. male who was admitted 3/12/2017 after presenting to ER complaining of SOB.   Pt was found to have CHF exacerbation and also pleural effusion. Pt has also a history of non complaint with his medication. He was admitted for further treatment, started on diuresis and also US thoracentesis was done and 2L of fluid were removed after a right thoracentesis. There also was some concern for pneumonia and he was started on  Ceftriaxone and azithro. Pt symptoms improved, his SOB has resolved and he is now on room air.  Cultures from thoracentesis have remained  "neg.   Pt will be discharged home today     Disposition:   Discharge home    Condition:  Stable    Activity:   As tolerated     Diet:   Regular Diet     Discharge Medications:         levofloxacin 500 MG tablet   Commonly known as:  LEVAQUIN   Next Dose Due:  Tomorrow 3/18    Take 1 Tab by mouth every day.   Dose:  500 mg       predniSONE 10 MG Tabs   Last time this was given:  40 mg on 3/17/2017  9:00 AM   Commonly known as:  DELTASONE   Next Dose Due:  Tomorrow 3/18 30mg    Take 1 Tab by mouth every day for 9 days. Take 30 mg for 3 days then 20 mg for 3 days then 10 mg for 3 days.   Dose:  10 mg       aspirin 81 MG EC tablet   Last time this was given:  81 mg on 3/17/2017  9:00 AM   Next Dose Due:  Tomorrow 3/18    Take 1 Tab by mouth every day.   Dose:  81 mg       atorvastatin 40 MG Tabs   Last time this was given:  40 mg on 3/16/2017 10:08 PM   Commonly known as:  LIPITOR   Next Dose Due:  Tonight 3/17    Take 1 Tab by mouth every evening.   Dose:  40 mg       carvedilol 12.5 MG Tabs   Last time this was given:  12.5 mg on 3/17/2017  9:00 AM   Commonly known as:  COREG   Next Dose Due:  Tonight 3/17    Take 1 Tab by mouth 2 times a day, with meals.   Dose:  12.5 mg       furosemide 40 MG Tabs   Commonly known as:  LASIX   Next Dose Due:  Tomorrow 3/18    Doctor's comments:  Take additional tablet as needed for weight gain of 2-3 lbs overnight or 5 lbs in a week.   Take 1 Tab by mouth every day.   Dose:  40 mg       insulin glargine 100 UNIT/ML Soln   Last time this was given:  15 Units on 3/16/2017 10:05 PM   Commonly known as:  LANTUS   Next Dose Due:  Tonight 3/17    Inject 15 Units as instructed every evening.   Dose:  15 Units       insulin regular 100 Unit/mL Soln   Commonly known as:  HUMULIN R   Next Dose Due:  Tonight before dinner 3/17    Inject 3 Units as instructed 3 times a day before meals.   Dose:  3 Units       INSULIN SYRINGE .5CC/30GX1/2\" 30G X 1/2\" 0.5 ML Misc   Next Dose Due:  Tonight before " dinner 3/17    Doctor's comments:  E11.9   1 Each by Does not apply route 4 Times a Day,Before Meals and at Bedtime.   Dose:  1 Each       lisinopril 20 MG Tabs   Last time this was given:  20 mg on 3/17/2017  9:00 AM   Commonly known as:  PRINIVIL   Next Dose Due:  Tomorrow 3/18    Take 1 Tab by mouth every day.   Dose:  20 mg       omeprazole 20 MG delayed-release capsule   Commonly known as:  PRILOSEC   Next Dose Due:  Tomorrow 3/18    Take 1 Cap by mouth every day.   Dose:  20 mg       spironolactone 25 MG Tabs   Last time this was given:  25 mg on 3/17/2017  9:00 AM   Commonly known as:  ALDACTONE   Next Dose Due:  Tomorrow 3/18    Take 1 Tab by mouth every day.   Dose:  25 mg       vitamin D 1000 UNIT Tabs   Last time this was given:  1,000 Units on 3/17/2017  9:00 AM   Commonly known as:  cholecalciferol   Next Dose Due:  Tomorrow 3/18    Take 1 Tab by mouth every day.   Dose:  1000 Units            Follow up appointment details :      Follow up with PCP     Future Appointments  Date Time Provider Department Center   3/20/2017 1:00 PM NNEKA Christine [id number]   3/21/2017 10:30 AM EDUIN Vanessa None         Instructions:      The were given instructions to return to the ER if patient's condition worsens      Time Spent on Discharge:     Discharge instructions were discussed with the patient at bedside. Patient  expressed understanding and agreed to comply with all discharge instructions.    41 minutes were spent in the discharge planning and management of this  patient, including more than 50% of the time spent face to face in   Counseling.

## 2017-03-20 PROBLEM — E11.9 DIABETES MELLITUS TYPE 2 IN NONOBESE (HCC): Status: RESOLVED | Noted: 2017-01-01 | Resolved: 2017-01-01

## 2017-03-20 NOTE — PROGRESS NOTES
Subjective:      Abner Torres is a 56 y.o. male who presents with Hospital Follow-up            HPI Abner Torres is here today for hospital follow-up for multiple problems.      1. Uncontrolled type 2 diabetes mellitus with proliferative retinopathy without macular edema, with long-term current use of insulin (CMS-HCC)  Patient last year had high hemoglobin A1c for which he was started on insulin. When I saw patient for his initial visit it did not appear he was taking his medications correctly and this was discussed. On February 27 he went to the hospital with hyperglycemia and apparently was not taking his insulin again and possibly due to financial issues. His hemoglobin A1c which was very good in December at 6.1 had shot up again high into the 16 range. Patient reports he is now taking his Lantus insulin as well as his short acting insulin 3 times a day and home blood sugar readings have been good. It is too early for hemoglobin A1c. He did show diabetic retinopathy on his initial visit but has not made an appointment with ophthalmology.    2. Leukocytosis, unspecified type  Patient also has been showing mild elevations of white blood cell count at the hospital but also has been ill.    3. Systolic congestive heart failure, unspecified congestive heart failure chronicity (CMS-HCC)  Patient was found to have heart failure on his most recent admission to the hospital. He was aggressively diuresed and had a thoracentesis with 2 L of fluid removed. He came to the hospital after having a syncopal episode. He is now taking his Lasix and carvedilol and reports no shortness of breath or weight gain. He states he has been dieting. He also is on spironolactone and aspirin.    4. Financial difficulties  Patient states some of the problem with missing his medications has been financial difficulties since he is not working. He has not worked since January 18 when his first hospital admission occurred. He did try  "to go back one day and ended up in the hospital. He still feels dizzy and not his usual self. He did have an MRI of the brain done in the hospital with some mild cerebral atrophy worse than expected for his age but no evidence of a CVA.    5. Presence of IVC filter  Patient was referred to the vascular clinic to deal with his IVC filter but he has not made an appointment.    Past Medical History   Diagnosis Date   • Diabetes    • Hypertension    • CHF (congestive heart failure) (CMS-[facility])    • Hyperlipidemia      Social History   Substance Use Topics   • Smoking status: Never Smoker    • Smokeless tobacco: Never Used   • Alcohol Use: No     Current Outpatient Prescriptions   Medication Sig Dispense Refill   • insulin glargine (LANTUS) 100 UNIT/ML Solution Inject 17 Units as instructed every evening. 10 mL 2   • furosemide (LASIX) 40 MG Tab Take 1 Tab by mouth every day. 30 Tab 0   • carvedilol (COREG) 12.5 MG Tab Take 1 Tab by mouth 2 times a day, with meals. 60 Tab 11   • atorvastatin (LIPITOR) 40 MG Tab Take 1 Tab by mouth every evening. 30 Tab 11   • aspirin 81 MG EC tablet Take 1 Tab by mouth every day. 30 Tab 3   • vitamin D (CHOLECALCIFEROL) 1000 UNIT Tab Take 1 Tab by mouth every day. 60 Tab 3   • omeprazole (PRILOSEC) 20 MG delayed-release capsule Take 1 Cap by mouth every day. 30 Cap 11   • lisinopril (PRINIVIL) 20 MG Tab Take 1 Tab by mouth every day. 30 Tab 11   • spironolactone (ALDACTONE) 25 MG Tab Take 1 Tab by mouth every day. 30 Tab 11   • insulin regular (HUMULIN R) 100 Unit/mL Solution Inject 3 Units as instructed 3 times a day before meals. 10 mL 2   • Insulin Syringe-Needle U-100 (INSULIN SYRINGE .5CC/30GX1/2\") 30G X 1/2\" 0.5 ML Misc 1 Each by Does not apply route 4 Times a Day,Before Meals and at Bedtime. 120 Each 2     No current facility-administered medications for this visit.     Family History   Problem Relation Age of Onset   • Diabetes Sister    • Diabetes Brother    • Heart Disease " "Brother 48     heart attacks   • Heart Disease Father 50     heart attack       Review of Systems   Neurological: Positive for dizziness.   Psychiatric/Behavioral: Positive for memory loss.   All other systems reviewed and are negative.         Objective:     /70 mmHg  Pulse 67  Temp(Src) 36.3 °C (97.4 °F)  Resp 16  Ht 1.854 m (6' 1\")  Wt 85.276 kg (188 lb)  BMI 24.81 kg/m2  SpO2 94%     Physical Exam   Constitutional: He is oriented to person, place, and time. He appears well-developed and well-nourished. No distress.   HENT:   Head: Normocephalic and atraumatic.   Right Ear: External ear normal.   Left Ear: External ear normal.   Nose: Nose normal.   Mouth/Throat: Oropharynx is clear and moist.   Eyes: Conjunctivae are normal. Right eye exhibits no discharge. Left eye exhibits no discharge.   Neck: Normal range of motion. Neck supple. No tracheal deviation present. No thyromegaly present.   Cardiovascular: Normal rate, regular rhythm and normal heart sounds.    No murmur heard.  Pulmonary/Chest: Effort normal and breath sounds normal. No respiratory distress. He has no wheezes. He has no rales.   Lymphadenopathy:     He has no cervical adenopathy.   Neurological: He is alert and oriented to person, place, and time. Coordination normal.   Skin: Skin is warm and dry. No rash noted. He is not diaphoretic. No erythema.   Psychiatric: He has a normal mood and affect. His behavior is normal. Judgment and thought content normal.   Patient has some difficulty with memory when discussing his appointments.   Nursing note and vitals reviewed.         mpression        1.  Mild cerebral atrophy beyond that expected for the patient's stated age of 56 years.  2.  No evidence of acute cerebral infarction, hemorrhage, or enhancing mass lesion.         Reading Provider Reading Date     Fei Wasserman M.D. Mar 1,      Component      Latest Ref Rng 12/24/2016 2/27/2017           6:05 AM  4:00 PM   Glycohemoglobin      " 0.0 - 5.6 % 6.1 (H) 16.5 (H)   Estim. Avg Glu       [phone]        Assessment/Plan:     1. 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 I have adjusted his medication list to his current dosages. We will continue to monitor regularly. I did give him a handout with the phone number of the ophthalmologist he needs to reach because of his diabetic retinopathy shown on testing at the office in January.  - insulin glargine (LANTUS) 100 UNIT/ML Solution; Inject 17 Units as instructed every evening.  Dispense: 10 mL; Refill: 2  - COMP METABOLIC PANEL; Future    2. Leukocytosis, unspecified type  I will do repeat CBC.  - CBC WITH DIFFERENTIAL; Future    3. Systolic congestive heart failure, unspecified congestive heart failure chronicity (CMS-HCC)  Patient did not realize he has an appointment tomorrow with cardiology and I wrote down for him to remember to follow through on this. He appears to be taking all of his medications and his weight is down. I am somewhat concerned about his memory issues and would like him to see a neurologist but he is already concerned about the co-pays with all of his specialty visits which are pending. I will continue to advise neurology follow-up.    4. Financial difficulties  Patient brings in today both FMLA paperwork and short-term disability paperwork and is hoping to get a paycheck after these are filled out. It does not appear he is ready to return to work with his multiple problems.    5. Presence of IVC filter  I wrote down for patient to remind her that he has an appointment next week with the vascular clinic and was not aware of this. I also wrote down regarding his appointment with pulmonology which he needs to make. This referral was placed in  January.

## 2017-03-20 NOTE — TELEPHONE ENCOUNTER
Dr. Bloch's office called and would like us to remind patient when he comes in today about his appointment for coagulation on 03/27/2017 at the [facility] at 3:15.  She stated patient was a little confused when she spoke to him and would like us to remind him as well.

## 2017-03-20 NOTE — MR AVS SNAPSHOT
"        Abner Torres   3/20/2017 1:00 PM   Office Visit   MRN: [id number]    Department:  [address] [place] [address]   Dept Phone:  [phone]    Description:  Male : 1960   Provider:  NNEKA Christine           Reason for Visit     Hospital Follow-up           Allergies as of 3/20/2017     No Known Allergies      You were diagnosed with     Uncontrolled type 2 diabetes mellitus with proliferative retinopathy without macular edema, with long-term current use of insulin (CMS-HCC)   [8067297]       Leukocytosis, unspecified type   [1269423]       Systolic congestive heart failure, unspecified congestive heart failure chronicity (CMS-HCC)   [[id number]]         Vital Signs     Blood Pressure Pulse Temperature Respirations Height Weight    118/70 mmHg 67 36.3 °C (97.4 °F) 16 1.854 m (6' 1\") 85.276 kg (188 lb)    Body Mass Index Oxygen Saturation Smoking Status             24.81 kg/m2 94% Never Smoker          Basic Information     Date Of Birth Sex Race Ethnicity Preferred Language    1960 Male White Non- English      Your appointments     Mar 21, 2017 10:30 AM   Heart Failure New with Mulu Whitaker M.D.   [facility] (--)    [address], [address]  [place] [id number] [phone]            Mar 27, 2017  3:15 PM   Established Patient with [facility] EXAM 4   [facility]  (--)    [address] [facility]  [place] [address]   [phone]            May 02, 2017  3:20 PM   Established Patient with NNEKA Christine   [facility] [address]    [address]  [address]  [place] [id number] [phone]           You will be receiving a confirmation call a few days before your appointment from our automated call confirmation system.            2017  3:20 PM   Initial Visit with Michael J Bloch, M.D., [facility] EXAM 1   [facility]  (--)    [facility] [facility] [address]   [phone]              Problem List              ICD-10-CM Priority Class Noted - Resolved    Medically noncompliant Z91.19   10/10/2016 - Present    Ventricular tachycardia (CMS-HCC) I47.2 Medium  10/11/2016 - Present    3-vessel coronary artery disease I25.10 High  10/13/2016 - Present    Normocytic anemia D64.9 Low  10/28/2016 - Present    Debility R53.81   11/22/2016 - Present    Protein-calorie malnutrition, severe (CMS-HCC) E43   12/1/2016 - Present    S/P CABG (coronary artery bypass graft) Z95.1   1/19/2017 - Present    Ischemic cardiomyopathy I25.5   1/20/2017 - Present    Financial difficulties Z59.8   1/20/2017 - Present    Mixed hyperlipidemia E78.2   1/30/2017 - Present    Leukocytosis D72.829 Medium  2/27/2017 - Present    CAD in native artery I25.10 Low  2/27/2017 - Present    CHF (congestive heart failure) (CMS-HCC) I50.9   3/7/2017 - Present    Uncontrolled type 2 diabetes mellitus with proliferative retinopathy without macular edema, with long-term current use of insulin (CMS-HCC) E11.3599, Z79.4, E11.65   3/20/2017 - Present      Health Maintenance        Date Due Completion Dates    IMM HEP B VACCINE (1 of 3 - Primary Series) 1960 ---    DIABETES MONOFILAMENT / LE EXAM 1960 ---    IMM DTaP/Tdap/Td Vaccine (1 - Tdap) 6/5/1980 ---    COLONOSCOPY 6/5/2011 ---    URINE ACR / MICROALBUMIN 8/3/2014 8/3/2013, 4/28/2012    A1C SCREENING 8/27/2017 2/27/2017, 12/24/2016, 10/10/2016, 5/1/2013, 4/28/2012    RETINAL SCREENING 1/31/2018 1/31/2017    FASTING LIPID PROFILE 3/12/2018 3/12/2017, 10/10/2016, 8/3/2013, 5/5/2013, 4/28/2012, 2/12/2009    SERUM CREATININE 3/17/2018 3/17/2017, 3/16/2017, 3/15/2017, 3/14/2017, 3/12/2017, 3/2/2017, 3/1/2017, 2/28/2017, 2/27/2017, 1/23/2017, 1/21/2017, 1/20/2017, 1/19/2017, 1/18/2017, 1/5/2017, 1/3/2017, 12/31/2016, 12/27/2016, 12/26/2016, 12/25/2016, 12/24/2016, 12/22/2016, 12/20/2016, 12/18/2016, 12/16/2016,  12/15/2016, 12/14/2016, 12/12/2016, 12/11/2016, 12/10/2016, 12/9/2016, 12/8/2016, 12/7/2016, 12/5/2016, 12/1/2016, 11/30/2016, 11/27/2016, 11/26/2016, 11/25/2016, 11/24/2016, 11/23/2016, 11/22/2016, 11/19/2016, 11/15/2016, 11/12/2016, 11/11/2016, 11/9/2016, 11/8/2016, 11/7/2016, 11/6/2016, 11/5/2016, 11/4/2016, 11/3/2016, 11/2/2016, 10/31/2016, 10/31/2016, 10/29/2016, 10/28/2016, 10/27/2016, 10/26/2016, 10/25/2016, 10/24/2016, 10/23/2016, 10/22/2016, 10/21/2016, 10/20/2016, 10/19/2016, 10/17/2016, 10/14/2016, 10/13/2016, 10/12/2016, 10/12/2016, 10/11/2016, 10/10/2016, 10/10/2016, 10/10/2016, 10/9/2016, 10/9/2016, 10/9/2016, 8/3/2013, 5/9/2013, 5/8/2013, 5/7/2013, 5/6/2013, 5/5/2013, 5/4/2013, 5/3/2013, 5/2/2013, 5/1/2013, 4/30/2013, 4/28/2012, 2/13/2009, 2/12/2009            Current Immunizations     Influenza Vaccine Quad Inj (Pf) 10/29/2016  6:10 AM    Pneumococcal polysaccharide vaccine (PPSV-23) 5/1/2013  4:44 AM, 4/30/2013      Below and/or attached are the medications your provider expects you to take. Review all of your home medications and newly ordered medications with your provider and/or pharmacist. Follow medication instructions as directed by your provider and/or pharmacist. Please keep your medication list with you and share with your provider. Update the information when medications are discontinued, doses are changed, or new medications (including over-the-counter products) are added; and carry medication information at all times in the event of emergency situations     Allergies:  No Known Allergies          Medications  Valid as of: March 20, 2017 -  1:35 PM    Generic Name Brand Name Tablet Size Instructions for use    Aspirin (Tablet Delayed Response) aspirin 81 MG Take 1 Tab by mouth every day.        Atorvastatin Calcium (Tab) LIPITOR 40 MG Take 1 Tab by mouth every evening.        Carvedilol (Tab) COREG 12.5 MG Take 1 Tab by mouth 2 times a day, with meals.        Cholecalciferol (Tab)  "cholecalciferol 1000 UNIT Take 1 Tab by mouth every day.        Furosemide (Tab) LASIX 40 MG Take 1 Tab by mouth every day.        Insulin Glargine (Solution) LANTUS 100 UNIT/ML Inject 17 Units as instructed every evening.        Insulin Regular Human (Solution) HUMULIN R 100 Unit/mL Inject 3 Units as instructed 3 times a day before meals.        Insulin Syringe-Needle U-100 (Misc) INSULIN SYRINGE .5CC/30GX1/2\" 30G X 1/2\" 0.5 ML 1 Each by Does not apply route 4 Times a Day,Before Meals and at Bedtime.        Lisinopril (Tab) PRINIVIL 20 MG Take 1 Tab by mouth every day.        Omeprazole (CAPSULE DELAYED RELEASE) PRILOSEC 20 MG Take 1 Cap by mouth every day.        Spironolactone (Tab) ALDACTONE 25 MG Take 1 Tab by mouth every day.        .                 Medicines prescribed today were sent to:     [facility] [address] [place] [address] [address] [place], [place] [address] [address] [place] [address]    Phone: [phone] Fax: [phone]    Open 24 Hours?: No      Medication refill instructions:       If your prescription bottle indicates you have medication refills left, it is not necessary to call your provider’s office. Please contact your pharmacy and they will refill your medication.    If your prescription bottle indicates you do not have any refills left, you may request refills at any time through one of the following ways: The online [employer] system (except Urgent Care), by calling your provider’s office, or by asking your pharmacy to contact your provider’s office with a refill request. Medication refills are processed only during regular business hours and may not be available until the next business day. Your provider may request additional information or to have a follow-up visit with you prior to refilling your medication.   *Please Note: Medication refills are assigned a new Rx number when refilled electronically. Your pharmacy may indicate that no refills were authorized even though a new prescription for the same " medication is available at the pharmacy. Please request the medicine by name with the pharmacy before contacting your provider for a refill.        Your To Do List     Future Labs/Procedures Complete By Expires    CBC WITH DIFFERENTIAL  As directed 3/21/2018    COMP METABOLIC PANEL  As directed 3/21/2018         MyChart Status: Patient Declined

## 2017-04-11 NOTE — TELEPHONE ENCOUNTER
1. Caller Name: Abner Torres                                           Call Back Number: [phone]        Patient approves a detailed voicemail message: N\A    Patient called, he states he is having a lot of swelling in his legs even with the prescription and would like to know if you could increase his prescription or what to do? Please advice

## 2017-04-11 NOTE — TELEPHONE ENCOUNTER
Patient's Lasix prescription from cardiology advises taking an extra Lasix if his weight is over 2-3 pounds overnight. If this does not work he needs to contact cardiology.

## 2017-04-13 PROBLEM — I50.22 CHRONIC SYSTOLIC CONGESTIVE HEART FAILURE, NYHA CLASS 4 (HCC): Status: ACTIVE | Noted: 2017-01-01

## 2017-04-13 PROBLEM — I50.9 CHF (CONGESTIVE HEART FAILURE) (HCC): Status: RESOLVED | Noted: 2017-01-01 | Resolved: 2017-01-01

## 2017-04-13 NOTE — MR AVS SNAPSHOT
"        Abner Torres   2017 9:40 AM   Office Visit   MRN: [id number]    Department:  [address] [place] [address]   Dept Phone:  [phone]    Description:  Male : 1960   Provider:  NNEKA Christine           Reason for Visit     Other release to go back to work       Allergies as of 2017     No Known Allergies      You were diagnosed with     Chronic systolic congestive heart failure, NYHA class 4 (CMS-HCC)   [138508]       Uncontrolled type 2 diabetes mellitus with proliferative retinopathy without macular edema, with long-term current use of insulin (CMS-HCC)   [8797009]         Vital Signs     Blood Pressure Pulse Temperature Respirations Height Weight    126/80 mmHg 69 36.9 °C (98.5 °F) 16 1.854 m (6' 0.99\") 99.338 kg (219 lb)    Body Mass Index Oxygen Saturation Smoking Status             28.90 kg/m2 96% Never Smoker          Basic Information     Date Of Birth Sex Race Ethnicity Preferred Language    1960 Male White Non- English      Your appointments     May 02, 2017  3:20 PM   Established Patient with NNEKA Christine   [facility] [address]    [address]  [facility] [address]  [facility] [address] [phone]           You will be receiving a confirmation call a few days before your appointment from our automated call confirmation system.            2017  3:20 PM   Initial Visit with Michael J Bloch, M.D., [facility] EXAM 1   [facility]  (--)    [address] [facility] [place] [address] [phone]              Problem List              ICD-10-CM Priority Class Noted - Resolved    Medically noncompliant Z91.19   10/10/2016 - Present    Ventricular tachycardia (CMS-HCC) I47.2 Medium  10/11/2016 - Present    3-vessel coronary artery disease I25.10 High  10/13/2016 - Present    Normocytic anemia D64.9 Low  10/28/2016 - Present    Debility R53.81   2016 - Present    S/P CABG (coronary artery " bypass graft) Z95.1   1/19/2017 - Present    Ischemic cardiomyopathy I25.5   1/20/2017 - Present    Financial difficulties Z59.8   1/20/2017 - Present    Mixed hyperlipidemia E78.2   1/30/2017 - Present    Leukocytosis D72.829 Medium  2/27/2017 - Present    CAD in native artery I25.10 Low  2/27/2017 - Present    Uncontrolled type 2 diabetes mellitus with proliferative retinopathy without macular edema, with long-term current use of insulin (CMS-HCC) E11.3599, Z79.4, E11.65   3/20/2017 - Present    Chronic systolic congestive heart failure, NYHA class 4 (CMS-HCC) I50.22   4/13/2017 - Present      Health Maintenance        Date Due Completion Dates    IMM HEP B VACCINE (1 of 3 - Primary Series) 1960 ---    IMM DTaP/Tdap/Td Vaccine (1 - Tdap) 6/5/1980 ---    URINE ACR / MICROALBUMIN 8/3/2014 8/3/2013, 4/28/2012    A1C SCREENING 8/27/2017 2/27/2017, 12/24/2016, 10/10/2016, 5/1/2013, 4/28/2012    RETINAL SCREENING 1/31/2018 1/31/2017    FASTING LIPID PROFILE 3/12/2018 3/12/2017, 10/10/2016, 8/3/2013, 5/5/2013, 4/28/2012, 2/12/2009    SERUM CREATININE 3/17/2018 3/17/2017, 3/16/2017, 3/15/2017, 3/14/2017, 3/12/2017, 3/2/2017, 3/1/2017, 2/28/2017, 2/27/2017, 1/23/2017, 1/21/2017, 1/20/2017, 1/19/2017, 1/18/2017, 1/5/2017, 1/3/2017, 12/31/2016, 12/27/2016, 12/26/2016, 12/25/2016, 12/24/2016, 12/22/2016, 12/20/2016, 12/18/2016, 12/16/2016, 12/15/2016, 12/14/2016, 12/12/2016, 12/11/2016, 12/10/2016, 12/9/2016, 12/8/2016, 12/7/2016, 12/5/2016, 12/1/2016, 11/30/2016, 11/27/2016, 11/26/2016, 11/25/2016, 11/24/2016, 11/23/2016, 11/22/2016, 11/19/2016, 11/15/2016, 11/12/2016, 11/11/2016, 11/9/2016, 11/8/2016, 11/7/2016, 11/6/2016, 11/5/2016, 11/4/2016, 11/3/2016, 11/2/2016, 10/31/2016, 10/31/2016, 10/29/2016, 10/28/2016, 10/27/2016, 10/26/2016, 10/25/2016, 10/24/2016, 10/23/2016, 10/22/2016, 10/21/2016, 10/20/2016, 10/19/2016, 10/17/2016, 10/14/2016, 10/13/2016, 10/12/2016, 10/12/2016, 10/11/2016, 10/10/2016, 10/10/2016,  "10/10/2016, 10/9/2016, 10/9/2016, 10/9/2016, 8/3/2013, 5/9/2013, 5/8/2013, 5/7/2013, 5/6/2013, 5/5/2013, 5/4/2013, 5/3/2013, 5/2/2013, 5/1/2013, 4/30/2013, 4/28/2012, 2/13/2009, 2/12/2009    DIABETES MONOFILAMENT / LE EXAM 4/13/2018 4/13/2017 (Done)    Override on 4/13/2017: Done            Current Immunizations     Influenza Vaccine Quad Inj (Pf) 10/29/2016  6:10 AM    Pneumococcal polysaccharide vaccine (PPSV-23) 5/1/2013  4:44 AM, 4/30/2013      Below and/or attached are the medications your provider expects you to take. Review all of your home medications and newly ordered medications with your provider and/or pharmacist. Follow medication instructions as directed by your provider and/or pharmacist. Please keep your medication list with you and share with your provider. Update the information when medications are discontinued, doses are changed, or new medications (including over-the-counter products) are added; and carry medication information at all times in the event of emergency situations     Allergies:  No Known Allergies          Medications  Valid as of: April 13, 2017 - 10:07 AM    Generic Name Brand Name Tablet Size Instructions for use    Aspirin (Tablet Delayed Response) aspirin 81 MG Take 1 Tab by mouth every day.        Atorvastatin Calcium (Tab) LIPITOR 40 MG Take 1 Tab by mouth every evening.        Carvedilol (Tab) COREG 12.5 MG Take 1 Tab by mouth 2 times a day, with meals.        Cholecalciferol (Tab) cholecalciferol 1000 UNIT Take 1 Tab by mouth every day.        Furosemide (Tab) LASIX 40 MG Take 1 Tab by mouth every day.        Insulin Glargine (Solution) LANTUS 100 UNIT/ML Inject 17 Units as instructed every evening.        Insulin Regular Human (Solution) HUMULIN R 100 Unit/mL Inject 3 Units as instructed 3 times a day before meals.        Insulin Syringe-Needle U-100 (Misc) INSULIN SYRINGE .5CC/30GX1/2\" 30G X 1/2\" 0.5 ML 1 Each by Does not apply route 4 Times a Day,Before Meals and at " Bedtime.        Lisinopril (Tab) PRINIVIL 20 MG Take 1 Tab by mouth every day.        Omeprazole (CAPSULE DELAYED RELEASE) PRILOSEC 20 MG Take 1 Cap by mouth every day.        Spironolactone (Tab) ALDACTONE 25 MG Take 1 Tab by mouth every day.        .                 Medicines prescribed today were sent to:     [facility] [address] [place] [address], [place] - [address]    [address] [place] [address]    Phone: [phone] Fax: [phone]    Open 24 Hours?: No      Medication refill instructions:       If your prescription bottle indicates you have medication refills left, it is not necessary to call your provider’s office. Please contact your pharmacy and they will refill your medication.    If your prescription bottle indicates you do not have any refills left, you may request refills at any time through one of the following ways: The online [email or web address] system (except Urgent Care), by calling your provider’s office, or by asking your pharmacy to contact your provider’s office with a refill request. Medication refills are processed only during regular business hours and may not be available until the next business day. Your provider may request additional information or to have a follow-up visit with you prior to refilling your medication.   *Please Note: Medication refills are assigned a new Rx number when refilled electronically. Your pharmacy may indicate that no refills were authorized even though a new prescription for the same medication is available at the pharmacy. Please request the medicine by name with the pharmacy before contacting your provider for a refill.        Your To Do List     Future Labs/Procedures Complete By Expires    COMP METABOLIC PANEL  As directed 4/14/2018    COMP METABOLIC PANEL  As directed 4/14/2018    HEMOGLOBIN A1C  As directed 4/14/2018    MICROALBUMIN CREAT RATIO URINE  As directed 4/14/2018      Referral     A referral request has been sent to our patient care coordination department.  Please allow 3-5 business days for us to process this request and contact you either by phone or mail. If you do not hear from us by the 5th business day, please call us at [phone].           YOGITECH Access Code: [email or web address]  Expires: 4/26/2017 12:11 PM    YOGITECH  A secure, online tool to manage your health information     [employer] YOGITECH® is a secure, online tool that connects you to your personalized health information from the privacy of your home -- day or night - making it very easy for you to manage your healthcare. Once the activation process is completed, you can even access your medical information using the YOGITECH brinda, which is available for free in the Apple Brinda store or Google Play store.     YOGITECH provides the following levels of access (as shown below):   My Chart Features   [facility] Doctor [facility]  Specialists [facility]  Urgent  Care Non-[facility]  Primary Care  Doctor   Email your healthcare team securely and privately 24/7 X X X    Manage appointments: schedule your next appointment; view details of past/upcoming appointments X      Request prescription refills. X      View recent personal medical records, including lab and immunizations X X X X   View health record, including health history, allergies, medications X X X X   Read reports about your outpatient visits, procedures, consult and ER notes X X X X   See your discharge summary, which is a recap of your hospital and/or ER visit that includes your diagnosis, lab results, and care plan. X X       How to register for YOGITECH:  1. Go to  [email or web address].  2. Click on the Sign Up Now box, which takes you to the New Member Sign Up page. You will need to provide the following information:  a. Enter your YOGITECH Access Code exactly as it appears at the top of this page. (You will not need to use this code after you’ve completed the sign-up process. If you do not sign up before the expiration date, you must  request a new code.)   b. Enter your date of birth.   c. Enter your home email address.   d. Click Submit, and follow the next screen’s instructions.  3. Create a [employer] ID. This will be your [employer] login ID and cannot be changed, so think of one that is secure and easy to remember.  4. Create a [employer] password. You can change your password at any time.  5. Enter your Password Reset Question and Answer. This can be used at a later time if you forget your password.   6. Enter your e-mail address. This allows you to receive e-mail notifications when new information is available in [employer].  7. Click Sign Up. You can now view your health information.    For assistance activating your [employer] account, call [phone]

## 2017-04-13 NOTE — Clinical Note
April 13, 2017       Patient: Abner Torres   YOB: 1960   Date of Visit: 4/13/2017         To Whom It May Concern:    It is my medical opinion that Abner Torres remain out of work until 4/17/17.    If you have any questions or concerns, please don't hesitate to call [phone]          Sincerely,          LORRIE Christine.P.GENA.  Electronically Signed

## 2017-04-13 NOTE — PROGRESS NOTES
Subjective:      Abner Torres is a 56 y.o. male who presents with Other            Other    Abner Torres was here initially just for paperwork for long-term disability but would like to go back to work and also has some swelling of his extremities.      1. Chronic systolic congestive heart failure, NYHA class 4 (CMS-HCC)  Patient has history of heart failure and coronary artery bypass grafting. He is currently on Lasix 40 mg daily and states he has been doing well up until last few days when he has noticed more bilateral lower extremity swelling. He has not increased his Lasix dosage as stated on the bottle. He also takes lisinopril and spironolactone with his last potassium level in the 3.7 range. He states he is not having increased shortness of breath, fatigue or PND. He does feel congested in the morning but this usually clears. He states he feels ready to go back to work. He states his job does not require exertion and he spends most of his time sitting but has a break every 2 hours where he can get up and walk around. He feels he is 100% and ready to go back to work. He takes his aspirin daily. It appears he did no show for his March appointment with cardiology and the vascular center.    2. Uncontrolled type 2 diabetes mellitus with proliferative retinopathy without macular edema, with long-term current use of insulin (CMS-HCC)  Patient's last hemoglobin A1c was very high and he states this was because he was not eating right. He reports that he is now taking his Lantus and Humalog insulin regularly. He does not appear to be checking his blood sugars regularly but when he does he states they are doing well.    Current Outpatient Prescriptions   Medication Sig Dispense Refill   • furosemide (LASIX) 40 MG Tab Take 1 Tab by mouth every day. 60 Tab 11   • insulin glargine (LANTUS) 100 UNIT/ML Solution Inject 17 Units as instructed every evening. 10 mL 2   • carvedilol (COREG) 12.5 MG Tab Take 1 Tab by  "mouth 2 times a day, with meals. 60 Tab 11   • atorvastatin (LIPITOR) 40 MG Tab Take 1 Tab by mouth every evening. 30 Tab 11   • aspirin 81 MG EC tablet Take 1 Tab by mouth every day. 30 Tab 3   • vitamin D (CHOLECALCIFEROL) 1000 UNIT Tab Take 1 Tab by mouth every day. 60 Tab 3   • omeprazole (PRILOSEC) 20 MG delayed-release capsule Take 1 Cap by mouth every day. 30 Cap 11   • lisinopril (PRINIVIL) 20 MG Tab Take 1 Tab by mouth every day. 30 Tab 11   • spironolactone (ALDACTONE) 25 MG Tab Take 1 Tab by mouth every day. 30 Tab 11   • Insulin Syringe-Needle U-100 (INSULIN SYRINGE .5CC/30GX1/2\") 30G X 1/2\" 0.5 ML Misc 1 Each by Does not apply route 4 Times a Day,Before Meals and at Bedtime. 120 Each 2   • insulin regular (HUMULIN R) 100 Unit/mL Solution Inject 3 Units as instructed 3 times a day before meals. 10 mL 2     No current facility-administered medications for this visit.     Social History   Substance Use Topics   • Smoking status: Never Smoker    • Smokeless tobacco: Never Used   • Alcohol Use: No     Past Medical History   Diagnosis Date   • Diabetes    • Hypertension    • CHF (congestive heart failure) (CMS-HCC)    • Hyperlipidemia      Family History   Problem Relation Age of Onset   • Diabetes Sister    • Diabetes Brother    • Heart Disease Brother 48     heart attacks   • Heart Disease Father 50     heart attack       Review of Systems   Cardiovascular: Positive for leg swelling.   All other systems reviewed and are negative.         Objective:     /80 mmHg  Pulse 69  Temp(Src) 36.9 °C (98.5 °F)  Resp 16  Ht 1.854 m (6' 0.99\")  Wt 99.338 kg (219 lb)  BMI 28.90 kg/m2  SpO2 96%     Physical Exam   Constitutional: He is oriented to person, place, and time. He appears well-developed and well-nourished. No distress.   HENT:   Head: Normocephalic and atraumatic.   Right Ear: External ear normal.   Left Ear: External ear normal.   Nose: Nose normal.   Mouth/Throat: Oropharynx is clear and moist. "   Eyes: Conjunctivae are normal. Right eye exhibits no discharge. Left eye exhibits no discharge.   Neck: Normal range of motion. Neck supple. No tracheal deviation present. No thyromegaly present.   Cardiovascular: Normal rate, regular rhythm and normal heart sounds.    No murmur heard.  Bilateral lower extremity edema.   Pulmonary/Chest: Effort normal and breath sounds normal. No respiratory distress. He has no wheezes. He has no rales.   Feet:   Right Foot:   Protective Sensation: 7 sites tested.7 sites sensed.  Skin Integrity: Negative for ulcer.   Left Foot:   Protective Sensation: 7 sites tested. 7 sites sensed.  Skin Integrity: Negative for ulcer.   Lymphadenopathy:     He has no cervical adenopathy.   Neurological: He is alert and oriented to person, place, and time. Coordination normal.   Skin: Skin is warm and dry. No rash noted. He is not diaphoretic. No erythema.   Psychiatric: He has a normal mood and affect. His behavior is normal. Judgment and thought content normal.   Patient initially angry because he feels that the insurance company has not been getting back to him regarding his disability paperwork.   Nursing note and vitals reviewed.              Assessment/Plan:     1. Chronic systolic congestive heart failure, NYHA class 4 (CMS-[facility])  Patient looks under diuresed in the office today so I will have him increase his Lasix to 1-1/2 tablets daily. His last potassium levels were low normal but I am reluctant to put him on potassium at this point until I see his results because he is on spironolactone and lisinopril. He states he will do his lab work on Saturday and if he is doing well I told him he can increase to 2 pills a day when he has a weight gain of 2-3 pounds overnight.    I reviewed with patient that he has history of noncompliance and that he no showed for his cardiology appointment in vascular appointment in March. He does not have an appointment for follow-up with cardiology and I placed  a new referral today. He does have vascular appointment next month. I stressed the importance of following through with seeing his specialists. He states he is following a low-sodium diet. He was given a note to return to work since it appears he feels ready and will not be exerting himself and probably will do better at work than at home.  - COMP METABOLIC PANEL; Future  - REFERRAL TO CARDIOLOGY  - COMP METABOLIC PANEL; Future  - furosemide (LASIX) 40 MG Tab; Take 1 Tab by mouth every day.  Dispense: 60 Tab; Refill: 11    2. Uncontrolled type 2 diabetes mellitus with proliferative retinopathy without macular edema, with long-term current use of insulin (CMS-HCC)  I reviewed with patient his high hemoglobin A1c from a few months ago but he states he is doing better and taking his insulin regularly. I will have him do a new hemoglobin A1c when he is due.  - COMP METABOLIC PANEL; Future  - MICROALBUMIN CREAT RATIO URINE; Future  - HEMOGLOBIN A1C; Future  - Diabetic Monofilament Lower Extremity Exam  - COMP METABOLIC PANEL; Future

## 2017-04-13 NOTE — Clinical Note
April 13, 2017       Patient: Abner Torres   YOB: 1960   Date of Visit: 4/13/2017         To Whom It May Concern:    It is my medical opinion that Abner Torres return to work on 4/14/17..    If you have any questions or concerns, please don't hesitate to call [phone]          Sincerely,          CARMELITA ChristinePSIRISHA.  Electronically Signed

## 2017-05-02 NOTE — MR AVS SNAPSHOT
Abner Torres   2017 3:20 PM   Office Visit   MRN: [id number]    Department:  [address] [place] [address]   Dept Phone:  [phone]    Description:  Male : 1960   Provider:  NNEKA Christine           Reason for Visit     Follow-Up 6 week       Allergies as of 2017     No Known Allergies      You were diagnosed with     Chronic systolic congestive heart failure, NYHA class 4 (CMS-HCC)   [199502]         Vital Signs     Blood Pressure Pulse Temperature Respirations Height Weight    128/68 mmHg 75 36.5 °C (97.7 °F) 16 1.829 m (6') 105.688 kg (233 lb)    Body Mass Index Oxygen Saturation Smoking Status             31.59 kg/m2 93% Never Smoker          Basic Information     Date Of Birth Sex Race Ethnicity Preferred Language    1960 Male White Non- English      Your appointments     2017  3:20 PM   Initial Visit with Michael J Bloch, M.D., [facility] EXAM 1   [facility]  (--)    [address] [place] [id number]   [phone]              Problem List              ICD-10-CM Priority Class Noted - Resolved    Medically noncompliant Z91.19   10/10/2016 - Present    Ventricular tachycardia (CMS-HCC) I47.2 Medium  10/11/2016 - Present    3-vessel coronary artery disease I25.10 High  10/13/2016 - Present    Normocytic anemia D64.9 Low  10/28/2016 - Present    Debility R53.81   2016 - Present    S/P CABG (coronary artery bypass graft) Z95.1   2017 - Present    Ischemic cardiomyopathy I25.5   2017 - Present    Financial difficulties Z59.8   2017 - Present    Mixed hyperlipidemia E78.2   2017 - Present    Leukocytosis D72.829 Medium  2017 - Present    CAD in native artery I25.10 Low  2017 - Present    Uncontrolled type 2 diabetes mellitus with proliferative retinopathy without macular edema, with long-term current use of insulin (CMS-HCC) E11.3599, Z79.4, E11.65   3/20/2017 - Present    Chronic  systolic congestive heart failure, NYHA class 4 (CMS-HCC) I50.22   4/13/2017 - Present      Health Maintenance        Date Due Completion Dates    IMM HEP B VACCINE (1 of 3 - Primary Series) 1960 ---    IMM DTaP/Tdap/Td Vaccine (1 - Tdap) 6/5/1980 ---    URINE ACR / MICROALBUMIN 8/3/2014 8/3/2013, 4/28/2012    A1C SCREENING 8/27/2017 2/27/2017, 12/24/2016, 10/10/2016, 5/1/2013, 4/28/2012    RETINAL SCREENING 1/31/2018 1/31/2017    FASTING LIPID PROFILE 3/12/2018 3/12/2017, 10/10/2016, 8/3/2013, 5/5/2013, 4/28/2012, 2/12/2009    DIABETES MONOFILAMENT / LE EXAM 4/13/2018 4/13/2017, 4/13/2017 (Done)    Override on 4/13/2017: Done    SERUM CREATININE 4/15/2018 4/15/2017, 3/17/2017, 3/16/2017, 3/15/2017, 3/14/2017, 3/12/2017, 3/2/2017, 3/1/2017, 2/28/2017, 2/27/2017, 1/23/2017, 1/21/2017, 1/20/2017, 1/19/2017, 1/18/2017, 1/5/2017, 1/3/2017, 12/31/2016, 12/27/2016, 12/26/2016, 12/25/2016, 12/24/2016, 12/22/2016, 12/20/2016, 12/18/2016, 12/16/2016, 12/15/2016, 12/14/2016, 12/12/2016, 12/11/2016, 12/10/2016, 12/9/2016, 12/8/2016, 12/7/2016, 12/5/2016, 12/1/2016, 11/30/2016, 11/27/2016, 11/26/2016, 11/25/2016, 11/24/2016, 11/23/2016, 11/22/2016, 11/19/2016, 11/15/2016, 11/12/2016, 11/11/2016, 11/9/2016, 11/8/2016, 11/7/2016, 11/6/2016, 11/5/2016, 11/4/2016, 11/3/2016, 11/2/2016, 10/31/2016, 10/31/2016, 10/29/2016, 10/28/2016, 10/27/2016, 10/26/2016, 10/25/2016, 10/24/2016, 10/23/2016, 10/22/2016, 10/21/2016, 10/20/2016, 10/19/2016, 10/17/2016, 10/14/2016, 10/13/2016, 10/12/2016, 10/12/2016, 10/11/2016, 10/10/2016, 10/10/2016, 10/10/2016, 10/9/2016, 10/9/2016, 10/9/2016, 8/3/2013, 5/9/2013, 5/8/2013, 5/7/2013, 5/6/2013, 5/5/2013, 5/4/2013, 5/3/2013, 5/2/2013, 5/1/2013, 4/30/2013, 4/28/2012, 2/13/2009, 2/12/2009            Current Immunizations     Influenza Vaccine Quad Inj (Pf) 10/29/2016  6:10 AM    Pneumococcal polysaccharide vaccine (PPSV-23) 5/1/2013  4:44 AM, 4/30/2013      Below and/or attached are the medications  "your provider expects you to take. Review all of your home medications and newly ordered medications with your provider and/or pharmacist. Follow medication instructions as directed by your provider and/or pharmacist. Please keep your medication list with you and share with your provider. Update the information when medications are discontinued, doses are changed, or new medications (including over-the-counter products) are added; and carry medication information at all times in the event of emergency situations     Allergies:  No Known Allergies          Medications  Valid as of: May 02, 2017 -  3:36 PM    Generic Name Brand Name Tablet Size Instructions for use    Aspirin (Tablet Delayed Response) aspirin 81 MG Take 1 Tab by mouth every day.        Atorvastatin Calcium (Tab) LIPITOR 40 MG Take 1 Tab by mouth every evening.        Carvedilol (Tab) COREG 12.5 MG Take 1 Tab by mouth 2 times a day, with meals.        Cholecalciferol (Tab) cholecalciferol 1000 UNIT Take 1 Tab by mouth every day.        Furosemide (Tab) LASIX 40 MG Take 1 Tab by mouth 2 Times a Day.        Insulin Glargine (Solution) LANTUS 100 UNIT/ML Inject 17 Units as instructed every evening.        Insulin Regular Human (Solution) HUMULIN R 100 Unit/mL Inject 3 Units as instructed 3 times a day before meals.        Insulin Syringe-Needle U-100 (Misc) INSULIN SYRINGE .5CC/30GX1/2\" 30G X 1/2\" 0.5 ML 1 Each by Does not apply route 4 Times a Day,Before Meals and at Bedtime.        Lisinopril (Tab) PRINIVIL 20 MG Take 1 Tab by mouth every day.        Omeprazole (CAPSULE DELAYED RELEASE) PRILOSEC 20 MG Take 1 Cap by mouth every day.        Spironolactone (Tab) ALDACTONE 25 MG Take 1 Tab by mouth 2 times a day.        .                 Medicines prescribed today were sent to:     [facility] [address] [place] [address], [place] - [address]    [address] [facility] [place] [address]    Phone: [phone] Fax: [phone]    Open 24 Hours?: No      Medication refill " instructions:       If your prescription bottle indicates you have medication refills left, it is not necessary to call your provider’s office. Please contact your pharmacy and they will refill your medication.    If your prescription bottle indicates you do not have any refills left, you may request refills at any time through one of the following ways: The online [employer] system (except Urgent Care), by calling your provider’s office, or by asking your pharmacy to contact your provider’s office with a refill request. Medication refills are processed only during regular business hours and may not be available until the next business day. Your provider may request additional information or to have a follow-up visit with you prior to refilling your medication.   *Please Note: Medication refills are assigned a new Rx number when refilled electronically. Your pharmacy may indicate that no refills were authorized even though a new prescription for the same medication is available at the pharmacy. Please request the medicine by name with the pharmacy before contacting your provider for a refill.        Your To Do List     Future Labs/Procedures Complete By Expires    COMP METABOLIC PANEL  As directed 5/3/2018      Referral     A referral request has been sent to our patient care coordination department. Please allow 3-5 business days for us to process this request and contact you either by phone or mail. If you do not hear from us by the 5th business day, please call us at [phone].           [employer] Access Code: [email or web address]  Expires: 5/25/2017  1:05 PM    [employer]  A secure, online tool to manage your health information     [employer]’s [employer] Software® is a secure, online tool that connects you to your personalized health information from the privacy of your home -- day or night - making it very easy for you to manage your healthcare. Once the activation process is completed, you can even access your medical  information using the Youboox brinda, which is available for free in the Apple Brinda store or Google Play store.     Youboox provides the following levels of access (as shown below):   My Chart Features   [facility] Doctor [facility]  Specialists [facility]  Urgent  Care Non-[facility]  Primary Care  Doctor   Email your healthcare team securely and privately 24/7 X X X    Manage appointments: schedule your next appointment; view details of past/upcoming appointments X      Request prescription refills. X      View recent personal medical records, including lab and immunizations X X X X   View health record, including health history, allergies, medications X X X X   Read reports about your outpatient visits, procedures, consult and ER notes X X X X   See your discharge summary, which is a recap of your hospital and/or ER visit that includes your diagnosis, lab results, and care plan. X X       How to register for Youboox:  1. Go to  [email or web address].  2. Click on the Sign Up Now box, which takes you to the New Member Sign Up page. You will need to provide the following information:  a. Enter your Youboox Access Code exactly as it appears at the top of this page. (You will not need to use this code after you’ve completed the sign-up process. If you do not sign up before the expiration date, you must request a new code.)   b. Enter your date of birth.   c. Enter your home email address.   d. Click Submit, and follow the next screen’s instructions.  3. Create a Youboox ID. This will be your Youboox login ID and cannot be changed, so think of one that is secure and easy to remember.  4. Create a Youboox password. You can change your password at any time.  5. Enter your Password Reset Question and Answer. This can be used at a later time if you forget your password.   6. Enter your e-mail address. This allows you to receive e-mail notifications when new information is available in Youboox.  7. Click Sign Up. You can now  view your health information.    For assistance activating your [employer] account, call [phone]

## 2017-05-02 NOTE — PROGRESS NOTES
"Subjective:      Abner Torres is a 56 y.o. male who presents with Follow-Up            HPI Abner Torres is here today for complaints of swelling.    Patient has history of heart failure and takes spironolactone 25 mg daily as well as Lasix 40 mg daily. He had been instructed in the past that he may increase to 2 pills if he has weight gain. He states that since I last saw him he has been taking 2 pills almost daily and occasionally would take a third pill to help with the swelling in his lower extremities. His swelling is worse when he has been standing for prolonged periods of time. He has not wanted to take off from work because of financial issues. He states he did start running out of his Lasix so only took 2 pills yesterday and one pill today of the Lasix. He states he has no chest pain, cough, fatigue or PND. He does not have a follow-up appointment with his cardiologist since out of the hospital. He does now have an appointment with the vascular clinic regarding IVC filter evaluation.          Current Outpatient Prescriptions   Medication Sig Dispense Refill   • furosemide (LASIX) 40 MG Tab Take 2.5 Tabs by mouth every day. 75 Tab 11   • spironolactone (ALDACTONE) 25 MG Tab Take 1 Tab by mouth every day. 30 Tab 11   • insulin glargine (LANTUS) 100 UNIT/ML Solution Inject 17 Units as instructed every evening. 10 mL 2   • carvedilol (COREG) 12.5 MG Tab Take 1 Tab by mouth 2 times a day, with meals. 60 Tab 11   • atorvastatin (LIPITOR) 40 MG Tab Take 1 Tab by mouth every evening. 30 Tab 11   • aspirin 81 MG EC tablet Take 1 Tab by mouth every day. 30 Tab 3   • vitamin D (CHOLECALCIFEROL) 1000 UNIT Tab Take 1 Tab by mouth every day. 60 Tab 3   • omeprazole (PRILOSEC) 20 MG delayed-release capsule Take 1 Cap by mouth every day. 30 Cap 11   • lisinopril (PRINIVIL) 20 MG Tab Take 1 Tab by mouth every day. 30 Tab 11   • Insulin Syringe-Needle U-100 (INSULIN SYRINGE .5CC/30GX1/2\") 30G X 1/2\" 0.5 ML Misc 1 " Each by Does not apply route 4 Times a Day,Before Meals and at Bedtime. 120 Each 2   • insulin regular (HUMULIN R) 100 Unit/mL Solution Inject 3 Units as instructed 3 times a day before meals. 10 mL 2     No current facility-administered medications for this visit.     Social History   Substance Use Topics   • Smoking status: Never Smoker    • Smokeless tobacco: Never Used   • Alcohol Use: No     Family History   Problem Relation Age of Onset   • Diabetes Sister    • Diabetes Brother    • Heart Disease Brother 48     heart attacks   • Heart Disease Father 50     heart attack     Past Medical History   Diagnosis Date   • Diabetes    • Hypertension    • CHF (congestive heart failure) (CMS-[facility])    • Hyperlipidemia        Review of Systems   Cardiovascular: Positive for leg swelling.   All other systems reviewed and are negative.         Objective:     /68 mmHg  Pulse 75  Temp(Src) 36.5 °C (97.7 °F)  Resp 16  Ht 1.829 m (6')  Wt 105.688 kg (233 lb)  BMI 31.59 kg/m2  SpO2 93%     Physical Exam   Constitutional: He is oriented to person, place, and time. He appears well-developed and well-nourished. No distress.   HENT:   Head: Normocephalic and atraumatic.   Right Ear: External ear normal.   Left Ear: External ear normal.   Nose: Nose normal.   Mouth/Throat: Oropharynx is clear and moist.   Eyes: Conjunctivae are normal. Right eye exhibits no discharge. Left eye exhibits no discharge.   Neck: Normal range of motion. Neck supple. No tracheal deviation present. No thyromegaly present.   Cardiovascular: Normal rate and regular rhythm.    Murmur heard.  Bilateral lower extremity edema.   Pulmonary/Chest: Effort normal and breath sounds normal. No respiratory distress. He has no wheezes. He has no rales.   Lymphadenopathy:     He has no cervical adenopathy.   Neurological: He is alert and oriented to person, place, and time. Coordination normal.   Skin: Skin is warm and dry. No rash noted. He is not diaphoretic.  No erythema.   Psychiatric: He has a normal mood and affect. His behavior is normal. Judgment and thought content normal.   Nursing note and vitals reviewed.              Assessment/Plan:     1. Chronic systolic congestive heart failure, NYHA class 4 (CMS-[facility])  Patient on his own has been taking 2-3 Lasix a day to get his swelling down so I will have him continue on 100 mg daily and he does show he is under diuresed in the office today. He will continue with his Aldactone as well. I will have him do lab work in 2 days to check electrolytes since he is on increased dose of Lasix but also takes Aldactone and lisinopril. His last CMP showed normal potassium levels. I also advised patient he needs to get back in to cardiology for follow-up posthospitalization. I told him to be sure to go to the emergency room if he develops shortness of breath, fatigue or PND despite treatment.  - COMP METABOLIC PANEL; Future  - REFERRAL TO CARDIOLOGY  - furosemide (LASIX) 40 MG Tab; Take 2.5 Tabs by mouth every day.  Dispense: 75 Tab; Refill: 11  - spironolactone (ALDACTONE) 25 MG Tab; Take 1 Tab by mouth every day.  Dispense: 30 Tab; Refill: 11

## 2017-05-15 NOTE — PROGRESS NOTES
Subjective:   Abner Torres is a 56 y.o. male who presents today for cardiac Evaluation and a heart failure clinic due to prior history of ischemic cardiomyopathy. In October 2016, patient underwent successful CABG ×4 with LIMA to LAD, SVG to diagonal, SVG to RCA and SVG to OM. He did have left ventricular systolic function of less than 40% (35%). He had a history of noncompliance to medical therapy. In March of this year, he did have an episode of heart failure exacerbation when he was hospitalized and diuresed. He was scheduled to see us in our heart failure clinic but has had multiple missed appointments. He is finally here today.    Patient reports of having shortness of breath with daily living activities or walking not too far distance up inclines. Patient was not able to complete his 6 minute walk test today because of desaturation.    Past Medical History   Diagnosis Date   • Diabetes    • Hypertension    • CHF (congestive heart failure) (CMS-HCC)    • Hyperlipidemia      Past Surgical History   Procedure Laterality Date   • Incision and drainage general  5/1/2013     Performed by Herbert Serrano M.D. at SUR[facility]ERY [facility]   • Debridement  5/1/2013     Performed by Herbert Serrano M.D. at SURGERY [facility]   • Debridement  5/22/2013     Performed by Herbert Serrano M.D. at [facility]   • Multiple coronary artery bypass endo vein harvest  10/18/2016     Procedure: MULTIPLE CORONARY ARTERY BYPASS ENDO VEIN HARVEST X4 WITH TROY;  Surgeon: Keith Rojas M.D.;  Location: [facility];  Service:    • Thoracoscopy Left 12/5/2016     Procedure: THORACOSCOPY  WITH PLEURODESIS;  Surgeon: John H Ganser, M.D.;  Location: [facility];  Service:    • Chest tube insertion Right 12/5/2016     Procedure: CHEST TUBE INSERTION;  Surgeon: John H Ganser, M.D.;  Location: [facility];  Service:    • Other cardiac surgery  10/2016     Family History   Problem  "Relation Age of Onset   • Diabetes Sister    • Diabetes Brother    • Heart Disease Brother 48     heart attacks   • Heart Disease Father 50     heart attack     History   Smoking status   • Never Smoker    Smokeless tobacco   • Never Used     No Known Allergies  Outpatient Encounter Prescriptions as of 5/15/2017   Medication Sig Dispense Refill   • torsemide (DEMADEX) 20 MG Tab Take 2 Tabs by mouth 2 times a day. 120 Tab 3   • carvedilol (COREG) 25 MG Tab Take 1 Tab by mouth 2 times a day, with meals. 60 Tab 11   • spironolactone (ALDACTONE) 25 MG Tab Take 1 Tab by mouth every day. 30 Tab 11   • insulin glargine (LANTUS) 100 UNIT/ML Solution Inject 17 Units as instructed every evening. 10 mL 2   • atorvastatin (LIPITOR) 40 MG Tab Take 1 Tab by mouth every evening. 30 Tab 11   • aspirin 81 MG EC tablet Take 1 Tab by mouth every day. 30 Tab 3   • vitamin D (CHOLECALCIFEROL) 1000 UNIT Tab Take 1 Tab by mouth every day. 60 Tab 3   • omeprazole (PRILOSEC) 20 MG delayed-release capsule Take 1 Cap by mouth every day. 30 Cap 11   • lisinopril (PRINIVIL) 20 MG Tab Take 1 Tab by mouth every day. 30 Tab 11   • insulin regular (HUMULIN R) 100 Unit/mL Solution Inject 3 Units as instructed 3 times a day before meals. 10 mL 2   • Insulin Syringe-Needle U-100 (INSULIN SYRINGE .5CC/30GX1/2\") 30G X 1/2\" 0.5 ML Misc 1 Each by Does not apply route 4 Times a Day,Before Meals and at Bedtime. 120 Each 2   • [DISCONTINUED] furosemide (LASIX) 40 MG Tab Take 2.5 Tabs by mouth every day. 75 Tab 11   • [DISCONTINUED] carvedilol (COREG) 12.5 MG Tab Take 1 Tab by mouth 2 times a day, with meals. 60 Tab 11     No facility-administered encounter medications on file as of 5/15/2017.     Review of Systems   Constitutional: Negative for fever, chills, weight loss and malaise/fatigue.   HENT: Negative for ear discharge, ear pain, hearing loss and nosebleeds.    Eyes: Negative for blurred vision, double vision, pain and discharge.   Respiratory: " "Negative for cough and shortness of breath.    Cardiovascular: Positive for leg swelling. Negative for chest pain, palpitations, orthopnea, claudication and PND.   Gastrointestinal: Negative for nausea, vomiting, abdominal pain, blood in stool and melena.   Genitourinary: Negative for dysuria and hematuria.   Musculoskeletal: Negative for myalgias, joint pain and falls.   Skin: Negative for itching and rash.   Neurological: Negative for dizziness, sensory change, speech change, loss of consciousness and headaches.   Endo/Heme/Allergies: Negative for environmental allergies. Does not bruise/bleed easily.   Psychiatric/Behavioral: Negative for depression, suicidal ideas and hallucinations.        Objective:   /64 mmHg  Pulse 72  Ht 1.829 m (6' 0.01\")  Wt 97.07 kg (214 lb)  BMI 29.02 kg/m2  SpO2 86%    Physical Exam   Constitutional: He is oriented to person, place, and time. He appears well-developed and well-nourished.   HENT:   Head: Normocephalic and atraumatic.   Eyes: EOM are normal.   Neck: Normal range of motion. No JVD present.   Cardiovascular: Normal rate, regular rhythm, normal heart sounds and intact distal pulses.  Exam reveals no gallop and no friction rub.    No murmur heard.  Bilateral femoral pulses are 2+, bilateral dorsalis pedis pulses are 2+, bilateral posterior tibialis pulses are 2+.   Pulmonary/Chest: He has no wheezes. He has rales.   Abdominal: Soft. Bowel sounds are normal. There is no tenderness. There is no rebound and no guarding.   The is no presence of abdominal bruits   Musculoskeletal: Normal range of motion. He exhibits edema. He exhibits no tenderness.   Neurological: He is alert and oriented to person, place, and time.   Skin: Skin is warm and dry.   Psychiatric: He has a normal mood and affect.   Nursing note and vitals reviewed.      Assessment:     1. ACC/AHA stage C systolic heart failure (CMS-HCC)  torsemide (DEMADEX) 20 MG Tab    carvedilol (COREG) 25 MG Tab   2. " Heart failure, NYHA class 3 (CMS-[facility])  torsemide (DEMADEX) 20 MG Tab    carvedilol (COREG) 25 MG Tab   3. Ischemic cardiomyopathy  torsemide (DEMADEX) 20 MG Tab    carvedilol (COREG) 25 MG Tab   4. S/P CABG (coronary artery bypass graft)  torsemide (DEMADEX) 20 MG Tab    carvedilol (COREG) 25 MG Tab       Medical Decision Making:  Today's Assessment / Status / Plan:     He appears to be volume up still.  I will stop furosemide and start patient on torsemide 40 mg by mouth twice a day.    I will increase carvedilol to 25 mg by mouth twice a day. Continue spironolactone and lisinopril at current dosage.    We will consider ICD placement for primary prevention in 3 months if his left ventricular systolic function remains less than 35% after optimization of his evidence based heart failure medical regimen.    Patient will come back to the heart failure program in 2 weeks to see Alexa Martell to assess for volume status. At that time further adjustment of medications including diuretic will be done.

## 2017-05-15 NOTE — Clinical Note
[facility]   [address], [address]  [place] [address]  Phone: [phone]  Fax: [phone]              Abner Torres  1960    Encounter Date: 5/15/2017    Mulu Whitaker M.D.          PROGRESS NOTE:  Subjective:   Abner Torres is a 56 y.o. male who presents today for cardiac Evaluation and a heart failure clinic due to prior history of ischemic cardiomyopathy. In October 2016, patient underwent successful CABG ×4 with LIMA to LAD, SVG to diagonal, SVG to RCA and SVG to OM. He did have left ventricular systolic function of less than 40% (35%). He had a history of noncompliance to medical therapy. In March of this year, he did have an episode of heart failure exacerbation when he was hospitalized and diuresed. He was scheduled to see us in our heart failure clinic but has had multiple missed appointments. He is finally here today.    Patient reports of having shortness of breath with daily living activities or walking not too far distance up inclines. Patient was not able to complete his 6 minute walk test today because of desaturation.    Past Medical History   Diagnosis Date   • Diabetes    • Hypertension    • CHF (congestive heart failure) (CMS-HCC)    • Hyperlipidemia      Past Surgical History   Procedure Laterality Date   • Incision and drainage general  5/1/2013     Performed by Herbert Serrano M.D. at SURGERY [facility]   • Debridement  5/1/2013     Performed by Herbert Serrano M.D. at [facility]   • Debridement  5/22/2013     Performed by Herbert Serrano M.D. at [facility]   • Multiple coronary artery bypass endo vein harvest  10/18/2016     Procedure: MULTIPLE CORONARY ARTERY BYPASS ENDO VEIN HARVEST X4 WITH TROY;  Surgeon: Keith Rojas M.D.;  Location: [facility];  Service:    • Thoracoscopy Left 12/5/2016     Procedure: THORACOSCOPY  WITH PLEURODESIS;  Surgeon: John H Ganser, M.D.;   "Location: SURGERY [facility];  Service:    • Chest tube insertion Right 12/5/2016     Procedure: CHEST TUBE INSERTION;  Surgeon: John H Ganser, M.D.;  Location: SURGERY [facility];  Service:    • Other cardiac surgery  10/2016     Family History   Problem Relation Age of Onset   • Diabetes Sister    • Diabetes Brother    • Heart Disease Brother 48     heart attacks   • Heart Disease Father 50     heart attack     History   Smoking status   • Never Smoker    Smokeless tobacco   • Never Used     No Known Allergies  Outpatient Encounter Prescriptions as of 5/15/2017   Medication Sig Dispense Refill   • torsemide (DEMADEX) 20 MG Tab Take 2 Tabs by mouth 2 times a day. 120 Tab 3   • carvedilol (COREG) 25 MG Tab Take 1 Tab by mouth 2 times a day, with meals. 60 Tab 11   • spironolactone (ALDACTONE) 25 MG Tab Take 1 Tab by mouth every day. 30 Tab 11   • insulin glargine (LANTUS) 100 UNIT/ML Solution Inject 17 Units as instructed every evening. 10 mL 2   • atorvastatin (LIPITOR) 40 MG Tab Take 1 Tab by mouth every evening. 30 Tab 11   • aspirin 81 MG EC tablet Take 1 Tab by mouth every day. 30 Tab 3   • vitamin D (CHOLECALCIFEROL) 1000 UNIT Tab Take 1 Tab by mouth every day. 60 Tab 3   • omeprazole (PRILOSEC) 20 MG delayed-release capsule Take 1 Cap by mouth every day. 30 Cap 11   • lisinopril (PRINIVIL) 20 MG Tab Take 1 Tab by mouth every day. 30 Tab 11   • insulin regular (HUMULIN R) 100 Unit/mL Solution Inject 3 Units as instructed 3 times a day before meals. 10 mL 2   • Insulin Syringe-Needle U-100 (INSULIN SYRINGE .5CC/30GX1/2\") 30G X 1/2\" 0.5 ML Misc 1 Each by Does not apply route 4 Times a Day,Before Meals and at Bedtime. 120 Each 2   • [DISCONTINUED] furosemide (LASIX) 40 MG Tab Take 2.5 Tabs by mouth every day. 75 Tab 11   • [DISCONTINUED] carvedilol (COREG) 12.5 MG Tab Take 1 Tab by mouth 2 times a day, with meals. 60 Tab 11     No facility-administered encounter medications on file as of 5/15/2017.     " "    Review of Systems   Constitutional: Negative for fever, chills, weight loss and malaise/fatigue.   HENT: Negative for ear discharge, ear pain, hearing loss and nosebleeds.    Eyes: Negative for blurred vision, double vision, pain and discharge.   Respiratory: Negative for cough and shortness of breath.    Cardiovascular: Positive for leg swelling. Negative for chest pain, palpitations, orthopnea, claudication and PND.   Gastrointestinal: Negative for nausea, vomiting, abdominal pain, blood in stool and melena.   Genitourinary: Negative for dysuria and hematuria.   Musculoskeletal: Negative for myalgias, joint pain and falls.   Skin: Negative for itching and rash.   Neurological: Negative for dizziness, sensory change, speech change, loss of consciousness and headaches.   Endo/Heme/Allergies: Negative for environmental allergies. Does not bruise/bleed easily.   Psychiatric/Behavioral: Negative for depression, suicidal ideas and hallucinations.        Objective:   /64 mmHg  Pulse 72  Ht 1.829 m (6' 0.01\")  Wt 97.07 kg (214 lb)  BMI 29.02 kg/m2  SpO2 86%    Physical Exam   Constitutional: He is oriented to person, place, and time. He appears well-developed and well-nourished.   HENT:   Head: Normocephalic and atraumatic.   Eyes: EOM are normal.   Neck: Normal range of motion. No JVD present.   Cardiovascular: Normal rate, regular rhythm, normal heart sounds and intact distal pulses.  Exam reveals no gallop and no friction rub.    No murmur heard.  Bilateral femoral pulses are 2+, bilateral dorsalis pedis pulses are 2+, bilateral posterior tibialis pulses are 2+.   Pulmonary/Chest: He has no wheezes. He has rales.   Abdominal: Soft. Bowel sounds are normal. There is no tenderness. There is no rebound and no guarding.   The is no presence of abdominal bruits   Musculoskeletal: Normal range of motion. He exhibits edema. He exhibits no tenderness.   Neurological: He is alert and oriented to person, place, " and time.   Skin: Skin is warm and dry.   Psychiatric: He has a normal mood and affect.   Nursing note and vitals reviewed.      Assessment:     1. ACC/AHA stage C systolic heart failure (CMS-HCC)  torsemide (DEMADEX) 20 MG Tab    carvedilol (COREG) 25 MG Tab   2. Heart failure, NYHA class 3 (CMS-HCC)  torsemide (DEMADEX) 20 MG Tab    carvedilol (COREG) 25 MG Tab   3. Ischemic cardiomyopathy  torsemide (DEMADEX) 20 MG Tab    carvedilol (COREG) 25 MG Tab   4. S/P CABG (coronary artery bypass graft)  torsemide (DEMADEX) 20 MG Tab    carvedilol (COREG) 25 MG Tab       Medical Decision Making:  Today's Assessment / Status / Plan:     He appears to be volume up still.  I will stop furosemide and start patient on torsemide 40 mg by mouth twice a day.    I will increase carvedilol to 25 mg by mouth twice a day. Continue spironolactone and lisinopril at current dosage.    We will consider ICD placement for primary prevention in 3 months if his left ventricular systolic function remains less than 35% after optimization of his evidence based heart failure medical regimen.    Patient will come back to the heart failure program in 2 weeks to see Alexa Martell to assess for volume status. At that time further adjustment of medications including diuretic will be done.      STEPHANIE Christine.  [address]  [place] [address]  VIA In Basket

## 2017-05-15 NOTE — MR AVS SNAPSHOT
"        Abner Torres   5/15/2017 3:00 PM   Office Visit   MRN: [id number]    Department:  [facility]   Dept Phone:  [phone]    Description:  Male : 1960   Provider:  Mulu Whitaker M.D.           Reason for Visit     New Patient C/O coughing when talking. Oxygen saturation on room air various ranging from 85%-90% on room air.       Allergies as of 5/15/2017     No Known Allergies      You were diagnosed with     ACC/AHA stage C systolic heart failure (CMS-[facility])   [0824195]       Heart failure, NYHA class 3 (CMS-[facility])   [221852]       Ischemic cardiomyopathy   [833840]       S/P CABG (coronary artery bypass graft)   [883451]         Vital Signs     Blood Pressure Pulse Height Weight Body Mass Index Oxygen Saturation    122/64 mmHg 72 1.829 m (6' 0.01\") 97.07 kg (214 lb) 29.02 kg/m2 86%    Smoking Status                   Never Smoker            Basic Information     Date Of Birth Sex Race Ethnicity Preferred Language    1960 Male White Non- English      Your appointments     2017  3:40 PM   FOLLOW UP with LEA Carballo   [facility] (--)    [address], [address]  [facility] [id number] [phone]            2017  3:20 PM   Initial Visit with Michael J Bloch, M.D., IH EXAM 1   [facility]  (--)    [address] [place] [address]   [phone]              Problem List              ICD-10-CM Priority Class Noted - Resolved    Medically noncompliant Z91.19   10/10/2016 - Present    Ventricular tachycardia (CMS-[facility]) I47.2 Medium  10/11/2016 - Present    3-vessel coronary artery disease I25.10 High  10/13/2016 - Present    Normocytic anemia D64.9 Low  10/28/2016 - Present    Debility R53.81   2016 - Present    S/P CABG (coronary artery bypass graft) Z95.1   2017 - Present    Ischemic cardiomyopathy I25.5   2017 - Present    Financial " difficulties Z59.8   1/20/2017 - Present    Mixed hyperlipidemia E78.2   1/30/2017 - Present    Leukocytosis D72.829 Medium  2/27/2017 - Present    CAD in native artery I25.10 Low  2/27/2017 - Present    Uncontrolled type 2 diabetes mellitus with proliferative retinopathy without macular edema, with long-term current use of insulin (CMS-HCC) E11.3599, Z79.4, E11.65   3/20/2017 - Present    Chronic systolic congestive heart failure, NYHA class 4 (CMS-HCC) I50.22   4/13/2017 - Present      Health Maintenance        Date Due Completion Dates    IMM HEP B VACCINE (1 of 3 - Primary Series) 1960 ---    IMM DTaP/Tdap/Td Vaccine (1 - Tdap) 6/5/1980 ---    URINE ACR / MICROALBUMIN 8/3/2014 8/3/2013, 4/28/2012    A1C SCREENING 8/27/2017 2/27/2017, 12/24/2016, 10/10/2016, 5/1/2013, 4/28/2012    RETINAL SCREENING 1/31/2018 1/31/2017    FASTING LIPID PROFILE 3/12/2018 3/12/2017, 10/10/2016, 8/3/2013, 5/5/2013, 4/28/2012, 2/12/2009    DIABETES MONOFILAMENT / LE EXAM 4/13/2018 4/13/2017, 4/13/2017 (Done)    Override on 4/13/2017: Done    SERUM CREATININE 4/15/2018 4/15/2017, 3/17/2017, 3/16/2017, 3/15/2017, 3/14/2017, 3/12/2017, 3/2/2017, 3/1/2017, 2/28/2017, 2/27/2017, 1/23/2017, 1/21/2017, 1/20/2017, 1/19/2017, 1/18/2017, 1/5/2017, 1/3/2017, 12/31/2016, 12/27/2016, 12/26/2016, 12/25/2016, 12/24/2016, 12/22/2016, 12/20/2016, 12/18/2016, 12/16/2016, 12/15/2016, 12/14/2016, 12/12/2016, 12/11/2016, 12/10/2016, 12/9/2016, 12/8/2016, 12/7/2016, 12/5/2016, 12/1/2016, 11/30/2016, 11/27/2016, 11/26/2016, 11/25/2016, 11/24/2016, 11/23/2016, 11/22/2016, 11/19/2016, 11/15/2016, 11/12/2016, 11/11/2016, 11/9/2016, 11/8/2016, 11/7/2016, 11/6/2016, 11/5/2016, 11/4/2016, 11/3/2016, 11/2/2016, 10/31/2016, 10/31/2016, 10/29/2016, 10/28/2016, 10/27/2016, 10/26/2016, 10/25/2016, 10/24/2016, 10/23/2016, 10/22/2016, 10/21/2016, 10/20/2016, 10/19/2016, 10/17/2016, 10/14/2016, 10/13/2016, 10/12/2016, 10/12/2016, 10/11/2016, 10/10/2016, 10/10/2016,  "10/10/2016, 10/9/2016, 10/9/2016, 10/9/2016, 8/3/2013, 5/9/2013, 5/8/2013, 5/7/2013, 5/6/2013, 5/5/2013, 5/4/2013, 5/3/2013, 5/2/2013, 5/1/2013, 4/30/2013, 4/28/2012, 2/13/2009, 2/12/2009            Results       Current Immunizations     Influenza Vaccine Quad Inj (Pf) 10/29/2016  6:10 AM    Pneumococcal polysaccharide vaccine (PPSV-23) 5/1/2013  4:44 AM, 4/30/2013      Below and/or attached are the medications your provider expects you to take. Review all of your home medications and newly ordered medications with your provider and/or pharmacist. Follow medication instructions as directed by your provider and/or pharmacist. Please keep your medication list with you and share with your provider. Update the information when medications are discontinued, doses are changed, or new medications (including over-the-counter products) are added; and carry medication information at all times in the event of emergency situations     Allergies:  No Known Allergies          Medications  Valid as of: May 15, 2017 -  5:12 PM    Generic Name Brand Name Tablet Size Instructions for use    Aspirin (Tablet Delayed Response) aspirin 81 MG Take 1 Tab by mouth every day.        Atorvastatin Calcium (Tab) LIPITOR 40 MG Take 1 Tab by mouth every evening.        Carvedilol (Tab) COREG 25 MG Take 1 Tab by mouth 2 times a day, with meals.        Cholecalciferol (Tab) cholecalciferol 1000 UNIT Take 1 Tab by mouth every day.        Insulin Glargine (Solution) LANTUS 100 UNIT/ML Inject 17 Units as instructed every evening.        Insulin Regular Human (Solution) HUMULIN R 100 Unit/mL Inject 3 Units as instructed 3 times a day before meals.        Insulin Syringe-Needle U-100 (Misc) INSULIN SYRINGE .5CC/30GX1/2\" 30G X 1/2\" 0.5 ML 1 Each by Does not apply route 4 Times a Day,Before Meals and at Bedtime.        Lisinopril (Tab) PRINIVIL 20 MG Take 1 Tab by mouth every day.        Omeprazole (CAPSULE DELAYED RELEASE) PRILOSEC 20 MG Take 1 Cap by " mouth every day.        Spironolactone (Tab) ALDACTONE 25 MG Take 1 Tab by mouth every day.        Torsemide (Tab) DEMADEX 20 MG Take 2 Tabs by mouth 2 times a day.        .                 Medicines prescribed today were sent to:     [facility] [place] - [address] [place] [address]    Phone: [phone] Fax: [phone]    Open 24 Hours?: No      Medication refill instructions:       If your prescription bottle indicates you have medication refills left, it is not necessary to call your provider’s office. Please contact your pharmacy and they will refill your medication.    If your prescription bottle indicates you do not have any refills left, you may request refills at any time through one of the following ways: The online Olista system (except Urgent Care), by calling your provider’s office, or by asking your pharmacy to contact your provider’s office with a refill request. Medication refills are processed only during regular business hours and may not be available until the next business day. Your provider may request additional information or to have a follow-up visit with you prior to refilling your medication.   *Please Note: Medication refills are assigned a new Rx number when refilled electronically. Your pharmacy may indicate that no refills were authorized even though a new prescription for the same medication is available at the pharmacy. Please request the medicine by name with the pharmacy before contacting your provider for a refill.           Olista Access Code: [email or web address]  Expires: 5/25/2017  1:05 PM    Olista  A secure, online tool to manage your health information     [employer]’s Olista® is a secure, online tool that connects you to your personalized health information from the privacy of your home -- day or night - making it very easy for you to manage your healthcare. Once the activation process is completed, you can even access your medical  information using the [employer] brinda, which is available for free in the Apple Brinda store or Google Play store.     [employer] provides the following levels of access (as shown below):   My Chart Features   [facility] Primary Care Doctor [facility]  Specialists [facility]  Urgent  Care Non-[facility]  Primary Care  Doctor   Email your healthcare team securely and privately 24/7 X X X    Manage appointments: schedule your next appointment; view details of past/upcoming appointments X      Request prescription refills. X      View recent personal medical records, including lab and immunizations X X X X   View health record, including health history, allergies, medications X X X X   Read reports about your outpatient visits, procedures, consult and ER notes X X X X   See your discharge summary, which is a recap of your hospital and/or ER visit that includes your diagnosis, lab results, and care plan. X X       How to register for [employer]:  1. Go to  [email or web address].  2. Click on the Sign Up Now box, which takes you to the New Member Sign Up page. You will need to provide the following information:  a. Enter your [employer] Access Code exactly as it appears at the top of this page. (You will not need to use this code after you’ve completed the sign-up process. If you do not sign up before the expiration date, you must request a new code.)   b. Enter your date of birth.   c. Enter your home email address.   d. Click Submit, and follow the next screen’s instructions.  3. Create a [employer] ID. This will be your [employer] login ID and cannot be changed, so think of one that is secure and easy to remember.  4. Create a [employer] password. You can change your password at any time.  5. Enter your Password Reset Question and Answer. This can be used at a later time if you forget your password.   6. Enter your e-mail address. This allows you to receive e-mail notifications when new information is available in [employer].  7. Click Sign Up. You can now  view your health information.    For assistance activating your [employer] account, call [phone]

## 2017-05-22 NOTE — PROGRESS NOTES
"Heart Failure New Appointment     6MWT- Not able to do walk, O2 too low.  MLWHF- 71    OP Heart Failure  Vitals  Appointment Type: Heart Failure New  Weight: 97.07 kg (214 lb)  How Weight Obtained: Stand Up Scale  Height: 182.9 cm (6' 0.01\")  BMI (Calculated): 29.02  Blood Pressure: 122/64 mmHg  Pulse: 72    System Assessment  NYHA Functional Class Assessment: Class III  ACC/AHA HF Stage: C    Smoking Hx  Have you Ever Smoked: Never     Alcohol Hx  Do you Drink?: No     Illicit Drug Hx  Illicit Drug History: No    Social Hx  Social History: Lives Alone  Level of Support: Unknown  Advance Directives: Began discussion, Paperwork provided    Education  Symptoms: Verbalizes understanding  Weighing: Verbalizes Understanding  Weight Gain Response: Verbalizes Understanding   Recording Data: Verbalizes understanding  Teach Back Failures: Teach Back Successful  Compliance: Patient is Compliant     Medications  Medication Reconciliation : Complete  Medication Counseling Provided: Verbalizes Understanding  Able to Accurately Identify Medication Indications: Some  Medication Discrepancies: None  Is Patient on an Evidence Based Beta Blocker: Yes  Is Patient on ACE-1 or ARB: Yes    Dietary Assessment  Food Labels: Verbalizes Understanding  Foods High in Sodium: Verbalizes Understanding  Daily Sodium Intake: Verbalizes Understanding  Diet: Verbalizes Understanding  Food Preparation: Verbalizes Understanding  Eating Out Plan: Needs Reinforcement  Healthier Options: Needs Reinforcement  Fluid Restriction: Not Applicable    MN Living with Heart Failure  Swelling in Ankles or Legs: 5  Having to Sit or Lie Down During the Day: 2  Walking About or Climbing Stairs Difficult: 3  Working Around the House or Yard Difficult: 4  Difficulty Going Away from Home: 2  Difficulty Sleeping Well at Night: 4  Difficulty Socializing with Family or Friends: 0  Difficulty Working to Earn a Livin  Difficulty with Recreational Pastimes, Sports, " Hobbies: 5  Difficulty with Sexual Activities: 5  Eating Less Foods You Like: 2  Making you Short of Breath: 3  Tired, Fatigued or Low on Energy: 0  Making you Stay in a Hospital: 3  Costing you Money for Medical Care?: 5  Giving you Side Effects from Treatments: 2  Feeling like a [place] to Family and Friends: 4  Loss of Self Control in your Life: 5  Making You Worry: 5  Difficulty to Concentrate or Remembering Things: 5  Making you Feel Depressed: 2  MLHF Total Score : 71    6 Minute Walk Test  Baseline to end of test:  (Not able to do walk, O2 too low.)     Education Narrative  Reviewed anatomy and physiology of heart failure with patient. Went over heart failure worksheet and patient's individual HF diagnosis, EF, risk factors, general medication classes and indications, as well as personal goals.  Goals: Patient's personal goal of improving his EF.    Discussed daily weights, sodium restriction, worsening signs and symptoms to report to physician, heart medications, and importance of adherence to medication regimen. Patient v/u of primary teaching points in HF education. Patient seems very knowledgeable of his diagnosis.     Reviewed dietary handouts, advance directive planning handout, and informed patient of HF new appointment follow-up phone call. Patient reports understanding of low-salt, heart healthy diet, but does not commit to changing his diet at this time.     Invited patient and family members/friends to HF support group and encouraged patient to call Heart Failure clinic during normal business hours with any questions.        Patient states full understanding of all information given.     Angela HF RN  [phone]

## 2017-06-06 NOTE — DISCHARGE PLANNING
Medical Social Work    Ongoing: MSW received a call from ERP regarding pt's aunt, Lisa wanting to withdraw care from pt and making him a DNR as per their previous conversations.  Per pt's aunt pt does have a brother; however, ERP was unable to get a hold of pt's brother at this time.  MSW consulted with Care Coordination Manager Yue Lopez.   Pt has previously designated his aunt as his emergency contact; therefore, she is able to make decisions on his behalf as NOK at this time.  ERP was updated.    Plan: SW will remain available as needed.

## 2017-06-06 NOTE — ED NOTES
1958- asystole- CPR started  2001- Pulse check, ROSC  2013- PEA, CPR started  2016- pulse check, ROSC  2036- asystole, CPR started  2038- pulse check, ROSC

## 2017-06-06 NOTE — DISCHARGE PLANNING
Medical Social Work    Referral: Critical Patient    Intervention: MSW responded to RD07 for a critical patient.  Pt is a 57 year old male brought in by [facility] after cardiac arrest; possibly down for 40 minutes.  Pt is Abner Torres (: 1960).  Per [facility] pt's aunt called from another state for a welfare check.  Pt's aunt is, Lisa [phone].  According to pt's records pt's aunt is his emergency contact.  ERP was provided with pt's aunt's phone number to contact her.      Plan: SW will continue to follow.

## 2017-06-06 NOTE — DISCHARGE PLANNING
Medical Social Work    Ongoing: MSW spoke with pt's aunt, Lisa ([phone]) who states that she was notified by ERP about pt's death.  Pt's aunt states that she also spoke with pt's brother about the situation.  MSW provided pt's aunt with information about next steps and e-mailed her mortuary choice, Help[employer] brochure and provided her with ER SW contact number.  Pt's aunt's e-mail is: [email or web address]    Plan: SW will remain available for pt's family as needed.

## 2017-06-06 NOTE — ED NOTES
from Lab called with critical result of lactic 8.4 at 2040. Critical lab result read back to .   Dr. Chatterjee notified of critical lab result at 2041.  Critical lab result read back by Dr. Chatterjee.

## 2017-06-06 NOTE — ED PROVIDER NOTES
ED Provider Note    Scribed for Marcos Chatterjee D.O. by Angelita Rios. 6/5/2017  7:57 PM    Primary care provider: NNEKA Christine  Means of arrival: Ambulance   History obtained from: EMS  History limited by: Patient intubated    CHIEF COMPLAINT  Chief Complaint   Patient presents with   • Cardiac Arrest     HPI  Abner Torres is a 57 y.o. male who presents to the Emergency Department by ambulance after his out of town aunt called EMS due to him informing her that he felt short of breath at 7 pm tonight. EMS arrived on scene 48 minutes ago to the patient unresponsive and pulseless. They note that he has a history of a quadruple bypass last year diabetes with multiple empty insulin bottles on scene. Per EMS his blood sugar reading was HIGH. EMS intubated the patient with a 7.5 tube and gave him three rounds of epinephrine. He was a 29 minute total code for EMS yet there was an unknown downtime prior to EMS arrival. EMS gave social work the aunt's phone number. Lisa [phone].    Further HPI limited secondary to patient intubated     REVIEW OF SYSTEMS  Pertinent positives include unresponsive, pulseless, high blood sugar, intubated.   See HPI for further details. Further review of systems is unobtainable as noted above.  C    PAST MEDICAL HISTORY  Past Medical History   Diagnosis Date   • Diabetes    • Hypertension    • CHF (congestive heart failure) (CMS-HCC)    • Hyperlipidemia      SURGICAL HISTORY  Past Surgical History   Procedure Laterality Date   • Incision and drainage general  5/1/2013     Performed by Herbert Serrano M.D. at SURGERY [facility] ORS   • Debridement  5/1/2013     Performed by Herbert Serrano M.D. at SURGERY [facility] ORS   • Debridement  5/22/2013     Performed by Herbert Serrano M.D. at SURGERY [facility]   • Multiple coronary artery bypass endo vein harvest  10/18/2016     Procedure: MULTIPLE CORONARY ARTERY BYPASS ENDO VEIN HARVEST X4 WITH TROY;   Surgeon: Keith Rojas M.D.;  Location: SURGERY [facility];  Service:    • Thoracoscopy Left 12/5/2016     Procedure: THORACOSCOPY  WITH PLEURODESIS;  Surgeon: John H Ganser, M.D.;  Location: SURGERY [facility];  Service:    • Chest tube insertion Right 12/5/2016     Procedure: CHEST TUBE INSERTION;  Surgeon: John H Ganser, M.D.;  Location: SURGERY [facility];  Service:    • Other cardiac surgery  10/2016      SOCIAL HISTORY  Social History   Substance Use Topics   • Smoking status: Never Smoker    • Smokeless tobacco: Never Used   • Alcohol Use: No      History   Drug Use No     FAMILY HISTORY  Family History   Problem Relation Age of Onset   • Diabetes Sister    • Diabetes Brother    • Heart Disease Brother 48     heart attacks   • Heart Disease Father 50     heart attack     CURRENT MEDICATIONS  No current facility-administered medications on file prior to encounter.     Current Outpatient Prescriptions on File Prior to Encounter   Medication Sig Dispense Refill   • carvedilol (COREG) 25 MG Tab Take 1 Tab by mouth 2 times a day, with meals. 60 Tab 11   • spironolactone (ALDACTONE) 25 MG Tab Take 1 Tab by mouth every day. 30 Tab 11   • insulin glargine (LANTUS) 100 UNIT/ML Solution Inject 17 Units as instructed every evening. 10 mL 2   • atorvastatin (LIPITOR) 40 MG Tab Take 1 Tab by mouth every evening. 30 Tab 11   • aspirin 81 MG EC tablet Take 1 Tab by mouth every day. 30 Tab 3   • vitamin D (CHOLECALCIFEROL) 1000 UNIT Tab Take 1 Tab by mouth every day. 60 Tab 3   • omeprazole (PRILOSEC) 20 MG delayed-release capsule Take 1 Cap by mouth every day. 30 Cap 11   • lisinopril (PRINIVIL) 20 MG Tab Take 1 Tab by mouth every day. 30 Tab 11   • insulin regular (HUMULIN R) 100 Unit/mL Solution Inject 3 Units as instructed 3 times a day before meals. 10 mL 2     ALLERGIES  No Known Allergies    PHYSICAL EXAM  VITAL SIGNS: /123 mmHg  Pulse 136  Temp(Src) 35.9 °C (96.6 °F)  Resp 16  Ht 1.829 m  (6')  Wt 81.647 kg (180 lb)  BMI 24.41 kg/m2  SpO2 94%    Nursing notes and vitals reviewed.  Constitutional: severe distress, intubated   Eyes: asymmetric and unreactive, right 4 mm and abnormal shape, left 6 mm, Conjunctiva normal, No discharge.   Cardiovascular: Normal heart rate, Normal rhythm, No murmurs, No rubs, No gallops.   Thorax & Lungs: Rales and rhonchi, breath sounds equal, Intubated and thick glutenous mucus, 7.5 et tube, Surgical sternotomy scar  Abdomen: Bowel sounds normal, Soft, No tenderness, No guarding, No rebound, No masses, No pulsatile masses.   Skin: Warm, pale, No erythema, No rash.   Musculoskeletal: Intact distal pulses, No edema, No cyanosis, No clubbing.   Neurologic: GCS 3   Psychiatric: Unable to assess    DIAGNOSTIC STUDIES/PROCEDURES    LABS  Results for orders placed or performed during the hospital encounter of 06/05/17   LACTIC ACID   Result Value Ref Range    Lactic Acid 8.4 (HH) 0.5 - 2.0 mmol/L   COMP METABOLIC PANEL   Result Value Ref Range    Sodium 127 (L) 135 - 145 mmol/L    Potassium 4.5 3.6 - 5.5 mmol/L    Chloride 89 (L) 96 - 112 mmol/L    Co2 15 (L) 20 - 33 mmol/L    Anion Gap 23.0 (H) 0.0 - 11.9    Glucose 892 (HH) 65 - 99 mg/dL    Bun 69 (H) 8 - 22 mg/dL    Creatinine 2.11 (H) 0.50 - 1.40 mg/dL    Calcium 8.5 8.5 - 10.5 mg/dL    AST(SGOT) 140 (H) 12 - 45 U/L    ALT(SGPT) 68 (H) 2 - 50 U/L    Alkaline Phosphatase 115 (H) 30 - 99 U/L    Total Bilirubin 1.2 0.1 - 1.5 mg/dL    Albumin 2.7 (L) 3.2 - 4.9 g/dL    Total Protein 6.9 6.0 - 8.2 g/dL    Globulin 4.2 (H) 1.9 - 3.5 g/dL    A-G Ratio 0.6 g/dL   URINALYSIS   Result Value Ref Range    Micro Urine Req Microscopic     Color Yellow     Character Sl Cloudy (A)     Specific Gravity 1.012 <1.035    Ph 6.0 5.0-8.0    Glucose >1000 (A) Negative mg/dL    Ketones Trace (A) Negative mg/dL    Protein 100 (A) Negative mg/dL    Bilirubin Negative Negative    Nitrite Negative Negative    Leukocyte Esterase Negative Negative     Occult Blood Moderate (A) Negative   MAGNESIUM   Result Value Ref Range    Magnesium 2.6 (H) 1.5 - 2.5 mg/dL   PROTHROMBIN TIME   Result Value Ref Range    PT 16.7 (H) 12.0 - 14.6 sec    INR 1.31 (H) 0.87 - 1.13   APTT   Result Value Ref Range    APTT 35.4 24.7 - 36.0 sec   PHOSPHORUS   Result Value Ref Range    Phosphorus 9.4 (H) 2.5 - 4.5 mg/dL   TROPONIN   Result Value Ref Range    Troponin I 0.69 (H) 0.00 - 0.04 ng/mL   URINE MICROSCOPIC (W/UA)   Result Value Ref Range    WBC 20-50 (A) /hpf    RBC  (A) /hpf    Epithelial Cells Few /hpf    Mucous Threads Few /hpf    Hyaline Cast 6-10 (A) /lpf   ESTIMATED GFR   Result Value Ref Range    GFR If  40 (A) >60 mL/min/1.73 m 2    GFR If Non  33 (A) >60 mL/min/1.73 m 2   All labs reviewed by me.        RADIOLOGY  DX-CHEST-PORTABLE (1 VIEW)   Final Result      1.  Pulmonary edema with patchy bibasilar alveolar opacities which may indicate alveolar edema or superimposed pneumonia.   2.  Small LEFT pleural effusion.   3.  No pneumothorax.   4.  Supportive tubing as described above.         CT-HEAD W/O    (Results Pending)   CT-CTA CHEST PULMONARY ARTERY W/ RECONS    (Results Pending)   The radiologist's interpretation of all radiological studies have been reviewed by me.    COURSE & MEDICAL DECISION MAKING  Pertinent Labs & Imaging studies reviewed. (See chart for details)    7:57 PM - Patient seen and examined at bedside. Ordered chest x-ray, Lactic acid, CBC with diff, CMP, Urinalysis, Urine culture, blood cultures, magnesium, prothrombin time, APTT, Phosphorus, Influenza rapid, BNP, Troponin to evaluate his symptoms. Sepsis protocol initiated.     8:00 PM- Patient went into asystole. He was treated with CPR and another round of epi and returned to sinus rhythm.     8:13 PM- Patient is PEA. He was treated with another round of epi. CPR initiated.     8:15 PM- pharmacy at bedside.     8:16 PM- Patient has pulses again.     8:17 PM- 1  amp of bicarb given.     8:25 PM I discussed the patient's case and the above findings with Dr. Bowen (hospitalist) who agrees to admit the patient.     8:26 PM Paged cardiology and pulmonology.     8:26 PM- Patient is hypotensive systolic at 60. I am treating him with an epi drip IV per pharmacy protocol. Levophed drip will be started once it is ready by pharmacy.     8:31 PM I discussed the patient's case and the above findings with Dr. Chaney (cardiology) who agrees to consult on the patient and states that he would like to talk to the family about withdrawing care for this patient as he does not believe the patient has a viable outcome.     8:35 PM- I ordered a head CT for further evaluation on the patient.     8:36 PM- Patient is in asystole again. CPR initiated.     8:38 PM- I am contacting the patient's aunt to inform her of patients current condition. Discussed code status and current options with her.     8:45 PM I discussed the patient's case with social work, they state that the patient has a brother who can be contacted for DNR wishes.     8:48 PM- Lisa give me the patient's brother's name and phone number. Gigi, [phone]    8:49 PM- Gigi contacted, I was unable to reach him.     8:53 PM- I ordered a STAT chest x-ray on the patient and chest pulmonary CT/CTA.     8:56 PM- I discussed the patient's case and the above findings with Dr. Leger (pulmonary) who agrees to consult on the patient.     9:08 PM- Lisa, aunt, states that the patient is DNR and he has expressed to her multiple times that he does not want to be on a ventilator, he does not want CPR and if he would happen to stop breathing and his heart would stop he did not want anything done. She states that withdrawaling of care is what he would want now. She is listed as emergency contact and next of kin on the patient and after discussed with  concerning next of kin and he agrees since the patient has her listed as next of  kin and the aunt states that she believes that she is able to make medical decisions for him secondary to the relationship. She is requesting that we withdraw care since the patient has undergone CPR for an extended period and the likelihood of a quality of life is low. She understands the duration of CPR, multiple cardiac arrests the patient has undergone would more likely result in significant hypoxic brain injury especially the fact the patient is fixed and dilated at this point.    9:09 PM- Attempting to contact Gigi, brother, one more time. I left him a message with [facility] phone number left.     9:10 PM- I am informing staff to withdraw care on the patient.     9:10 PM- I am treating the patient with 8 mg Morphine IV, 2 mg Ativan IV.     9:10 PM- Informed Dr. Bowen (hosptalist) of aunt's decision to withdraw care.     This is a 57-year-old gentleman with known congestive heart failure who underwent prolonged cardiac arrest with EMS for 29 minutes before ROSC and unknown downtime prior to this. The patient was brought to the emergency department and had multiple episodes of cardiac arrest with rhythms including asystole, PEA. The patient was placed on the list that and epinephrine drip to maintain a blood pressure heart rate. Initial EKG does not show evidence of ST elevation myocardial infarction, x-ray does reveal evidence of congestive heart final pneumonia, he isn't severely septic with a lactic acidosis of 8. Following this, I had a conversation with the patient's aunt who is the emergency contact and next of kin for the patient and I tried to call his brother as well. I was unable to contact his brother. I did contact the aunt and she stated that the patient has adamantly expressed his wishes not to be resuscitated, not to have ventilation or resuscitation. The patient is critically ill and the likelihood of having a meaningful come back and quality of life is extremely low on the patient and understands  this. She states that he did want us to stop intervening at this point in time. The patient has had prolonged CPR in the field with unknown downtime prior to asystole and CPR. In addition he has had multiple episodes of cardiac arrest here therefore I believe the patient is not going to respond any meaningful way. The patient's pupils were fixed and dilated upon arrival and continued to be the same during his care here. The patient received morphine 8 mg and Ativan 2 mg, he was extubated and was declared  at 2142 and a peaceful manner. I discussed this with the patient's Aunt and she was extremely thankful for care. Once again, the patient had unknown down time of asystole prior to EMS arrival, prolonged CPR for 29 minutes, multiple episodes of cardiac arrest in the emergency department and hemodynamically unstable. The aunts statement that the patient does not want further management especially in this type of situation is appropriate therefore care was withdrawn.  Lab x-ray evaluation reveals evidence of sepsis, diabetic ketoacidosis, non-ST elevation myocardial infarction added on with the patient's for found hypotension on 2 vasopressors, multiple episodes of cardiac arrest in the emergency department I do not believe the patient has a viable outcome. For this reason, the patient was extubated, sedation was given the patient is pronounced at 2142 per the aunt's direction.      CRITICAL CARE  The very real possibility of a deterioration of this patient's condition required the highest level of my preparedness for sudden, emergent intervention.  I provided critical care services, which included medication orders, frequent reevaluations of the patient's condition and response to treatment, ordering and reviewing test results, and discussing the case with various consultants.  The critical care time associated with the care of the patient was 70 minutes. Review chart for interventions. This time is exclusive  of any other billable procedures.     DISPOSITION:      FINAL IMPRESSION  Cardiac arrest  Sepsis septic shock  Diabetic ketoacidosis  Pneumonia  Critical care time of 70     I, Angelita Rios (Scribe), am scribing for, and in the presence of, Marcos Chatterjee D.O    Electronically signed by: Angelita Rios (Scribe), 2017    I, Marcos Chatterjee D.O. personally performed the services described in this documentation, as scribed by Angelita Rios in my presence, and it is both accurate and complete.    The note accurately reflects work and decisions made by me.  Marcos Chatterjee  2017  9:51 PM

## 2017-06-06 NOTE — ED NOTES
"Patient arrives via [facility] on vent. Patient found pulseless at home, 29min CPR done PTA, 3 rounds epi given by EMS, ROSC obtained PTA by EMS. Suspected down time 40 min per EMS. FSBS \"high\" per EMS PTA  "

## 2017-06-07 LAB
BACTERIA UR CULT: NORMAL
SIGNIFICANT IND 70042: NORMAL
SITE SITE: NORMAL
SOURCE SOURCE: NORMAL

## 2017-06-08 LAB
BACTERIA BLD CULT: ABNORMAL
BACTERIA BLD CULT: ABNORMAL
SIGNIFICANT IND 70042: ABNORMAL
SITE SITE: ABNORMAL
SOURCE SOURCE: ABNORMAL

## 2017-06-19 ENCOUNTER — TELEPHONE (OUTPATIENT)
Dept: VASCULAR LAB | Facility: MEDICAL CENTER | Age: 57
End: 2017-06-19

## 2017-08-08 NOTE — PROGRESS NOTES
Pt resting in bed, A&Ox4.  Reports of pain in right hip, declines intervention.  Discussed POC with pt.  Call light within reach, bed alarm on.     impairments found/functional limitations in following categories

## 2022-11-22 NOTE — THERAPY
"Physical Therapy Treatment completed.   Bed Mobility:  Supine to Sit: Stand by Assist  Transfers: Sit to Stand: Stand by Assist  Gait: Level Of Assist: Stand by Assist with Single Point Cane; see below       Plan of Care: Will benefit from Physical Therapy 2 times per week  Discharge Recommendations: Equipment: Single Point Cane. See below    Pt progressing as expected. Would recommend use of FWW at time of d/c for optimal balance/compensatory strategies for gait mechanics though pt reports he will not utilize. He does however also improve gait/balance with use of SPC (v no AD) and would recommend pt at minimal utilize during recovery to assist with fall prevention with pt in agreement. As prior, he appears generally unaware of etiology of current medical co-morbidities and adverse affect on endurance and ability to self monitor, though does appear more responsive to education and activity recs for self monitoring and progression of activity this visit. Anticiapte that pt would be functionally capable of d/c to home environment once medically cleared, though would recommend that pt utilize SPC during recovery and he may benefit from outpatient cardiac rehab for continued monitoring/education re: CHF and affect on function and lifestyle modification to assist with prevention of readmission. Will continue to follow while here and recommend continued skilled PT after medical d/c to home as able to progress balance and wean from AD/progress back to work.     See \"Rehab Therapy-Acute\" Patient Summary Report for complete documentation.       " Tissue Cultured Epidermal Autograft Text: The defect edges were debeveled with a #15 scalpel blade.  Given the location of the defect, shape of the defect and the proximity to free margins a tissue cultured epidermal autograft was deemed most appropriate.  The graft was then trimmed to fit the size of the defect.  The graft was then placed in the primary defect and oriented appropriately.

## 2025-04-01 NOTE — DOCUMENTATION QUERY
DOCUMENTATION QUERY    PROVIDERS: Please select “Cosign w/ note”to reply to query.    To better represent the severity of illness of your patient, please review the following information and exercise your independent professional judgment in responding to this query.     Patient presents with acute on chronic hypoxic respiratory failure secondary to CHF exacerbation and pneumonia. Patient has an ejection fraction of 35.    Can this CHF be further specified as    1. Systolic CHF exacerbation  2. Diastolic CHF exacerbation  3. Other explanation of clinical presentation (please document)  4. Unable to determine      The medical record reflects the following:   Clinical Findings  pleural effusion   acute on chronic respiratory failure   CHF exacerbation   Treatment  thoracentesis, diuresis, antibiotics   Risk Factors     Location within medical record  History and Physical, Progress Notes, Discharge Summary and Radiology Results     Thank you,   Cristiane Barreto          
Orientation to room/Bed in low position, brakes on/Assess for adequate lighting, leave nightlight on